# Patient Record
Sex: FEMALE | Race: WHITE | Employment: OTHER | URBAN - METROPOLITAN AREA
[De-identification: names, ages, dates, MRNs, and addresses within clinical notes are randomized per-mention and may not be internally consistent; named-entity substitution may affect disease eponyms.]

---

## 2021-08-03 PROBLEM — I25.10 CAD (CORONARY ARTERY DISEASE): Status: RESOLVED | Noted: 2021-08-03 | Resolved: 2021-08-03

## 2021-11-08 ENCOUNTER — HOSPITAL ENCOUNTER (OUTPATIENT)
Dept: LAB | Age: 76
Discharge: HOME | End: 2021-11-08
Payer: MEDICARE

## 2021-11-08 DIAGNOSIS — R06.02 SHORTNESS OF BREATH: ICD-10-CM

## 2021-11-08 LAB — BNP SERPL-MCNC: 179 PG/ML

## 2021-11-08 PROCEDURE — 36415 COLL VENOUS BLD VENIPUNCTURE: CPT

## 2021-11-08 PROCEDURE — 83880 ASSAY OF NATRIURETIC PEPTIDE: CPT

## 2021-12-13 PROBLEM — I34.0 MITRAL VALVE INSUFFICIENCY: Status: ACTIVE | Noted: 2021-12-13

## 2021-12-13 PROBLEM — I35.0 NONRHEUMATIC AORTIC VALVE STENOSIS: Status: ACTIVE | Noted: 2021-12-13

## 2021-12-13 PROBLEM — R06.09 DOE (DYSPNEA ON EXERTION): Status: ACTIVE | Noted: 2021-12-13

## 2021-12-23 NOTE — PROGRESS NOTES
Pre-procedure call completed. Instructed to arrive at 0600 for [facility]. Instructed to take ASA 81 mg x 4 before arrival while taking all other am prescribed medications. Instructed to HOLD all vitamins and supplements.

## 2021-12-27 ENCOUNTER — HOSPITAL ENCOUNTER (OUTPATIENT)
Age: 76
Setting detail: OUTPATIENT SURGERY
Discharge: HOME | End: 2021-12-27
Attending: INTERNAL MEDICINE | Admitting: INTERNAL MEDICINE
Payer: MEDICARE

## 2021-12-27 VITALS
HEART RATE: 74 BPM | DIASTOLIC BLOOD PRESSURE: 72 MMHG | RESPIRATION RATE: 16 BRPM | WEIGHT: 172 LBS | TEMPERATURE: 98.6 F | OXYGEN SATURATION: 96 % | BODY MASS INDEX: 29.37 KG/M2 | HEIGHT: 64 IN | SYSTOLIC BLOOD PRESSURE: 137 MMHG

## 2021-12-27 DIAGNOSIS — R06.09 DOE (DYSPNEA ON EXERTION): ICD-10-CM

## 2021-12-27 DIAGNOSIS — I34.0 MITRAL VALVE INSUFFICIENCY, UNSPECIFIED ETIOLOGY: ICD-10-CM

## 2021-12-27 DIAGNOSIS — I35.0 NONRHEUMATIC AORTIC VALVE STENOSIS: ICD-10-CM

## 2021-12-27 DIAGNOSIS — I25.10 CORONARY ARTERY DISEASE INVOLVING NATIVE CORONARY ARTERY OF NATIVE HEART WITHOUT ANGINA PECTORIS: ICD-10-CM

## 2021-12-27 LAB
ALBUMIN SERPL-MCNC: 3.5 G/DL (ref 3.2–4.6)
ALBUMIN/GLOB SERPL: 1.1 {RATIO} (ref 1.2–3.5)
ALP SERPL-CCNC: 65 U/L (ref 50–136)
ALT SERPL-CCNC: 17 U/L (ref 12–65)
ANION GAP SERPL CALC-SCNC: 4 MMOL/L (ref 7–16)
AST SERPL-CCNC: 17 U/L (ref 15–37)
ATRIAL RATE: 71 BPM
BILIRUB SERPL-MCNC: 0.3 MG/DL (ref 0.2–1.1)
BUN SERPL-MCNC: 15 MG/DL (ref 8–23)
CALCIUM SERPL-MCNC: 9.1 MG/DL (ref 8.3–10.4)
CALCULATED P AXIS, ECG09: 40 DEGREES
CALCULATED R AXIS, ECG10: 24 DEGREES
CALCULATED T AXIS, ECG11: -7 DEGREES
CHLORIDE SERPL-SCNC: 108 MMOL/L (ref 98–107)
CO2 SERPL-SCNC: 30 MMOL/L (ref 21–32)
CREAT SERPL-MCNC: 0.86 MG/DL (ref 0.6–1)
DIAGNOSIS, 93000: NORMAL
ERYTHROCYTE [DISTWIDTH] IN BLOOD BY AUTOMATED COUNT: 11.9 % (ref 11.9–14.6)
GLOBULIN SER CALC-MCNC: 3.3 G/DL (ref 2.3–3.5)
GLUCOSE SERPL-MCNC: 99 MG/DL (ref 65–100)
HCT VFR BLD AUTO: 38.9 % (ref 35.8–46.3)
HGB BLD-MCNC: 12.7 G/DL (ref 11.7–15.4)
MAGNESIUM SERPL-MCNC: 2.1 MG/DL (ref 1.8–2.4)
MCH RBC QN AUTO: 30 PG (ref 26.1–32.9)
MCHC RBC AUTO-ENTMCNC: 32.6 G/DL (ref 31.4–35)
MCV RBC AUTO: 91.7 FL (ref 79.6–97.8)
NRBC # BLD: 0 K/UL (ref 0–0.2)
P-R INTERVAL, ECG05: 232 MS
PLATELET # BLD AUTO: 221 K/UL (ref 150–450)
PMV BLD AUTO: 10.1 FL (ref 9.4–12.3)
POTASSIUM SERPL-SCNC: 3.2 MMOL/L (ref 3.5–5.1)
PROT SERPL-MCNC: 6.8 G/DL (ref 6.3–8.2)
Q-T INTERVAL, ECG07: 440 MS
QRS DURATION, ECG06: 116 MS
QTC CALCULATION (BEZET), ECG08: 478 MS
RBC # BLD AUTO: 4.24 M/UL (ref 4.05–5.2)
SODIUM SERPL-SCNC: 142 MMOL/L (ref 136–145)
VENTRICULAR RATE, ECG03: 71 BPM
WBC # BLD AUTO: 6.5 K/UL (ref 4.3–11.1)

## 2021-12-27 PROCEDURE — 74011250636 HC RX REV CODE- 250/636: Performed by: INTERNAL MEDICINE

## 2021-12-27 PROCEDURE — C1894 INTRO/SHEATH, NON-LASER: HCPCS | Performed by: INTERNAL MEDICINE

## 2021-12-27 PROCEDURE — 93005 ELECTROCARDIOGRAM TRACING: CPT | Performed by: INTERNAL MEDICINE

## 2021-12-27 PROCEDURE — 77030042317 HC BND COMPR HEMSTAT -B: Performed by: INTERNAL MEDICINE

## 2021-12-27 PROCEDURE — 99153 MOD SED SAME PHYS/QHP EA: CPT | Performed by: INTERNAL MEDICINE

## 2021-12-27 PROCEDURE — C1751 CATH, INF, PER/CENT/MIDLINE: HCPCS | Performed by: INTERNAL MEDICINE

## 2021-12-27 PROCEDURE — 93460 R&L HRT ART/VENTRICLE ANGIO: CPT | Performed by: INTERNAL MEDICINE

## 2021-12-27 PROCEDURE — 99152 MOD SED SAME PHYS/QHP 5/>YRS: CPT | Performed by: INTERNAL MEDICINE

## 2021-12-27 PROCEDURE — C1769 GUIDE WIRE: HCPCS | Performed by: INTERNAL MEDICINE

## 2021-12-27 PROCEDURE — 74011000250 HC RX REV CODE- 250: Performed by: INTERNAL MEDICINE

## 2021-12-27 PROCEDURE — 77030013687 HC GD NDL BARD -B: Performed by: INTERNAL MEDICINE

## 2021-12-27 PROCEDURE — 83735 ASSAY OF MAGNESIUM: CPT

## 2021-12-27 PROCEDURE — 80053 COMPREHEN METABOLIC PANEL: CPT

## 2021-12-27 PROCEDURE — 85027 COMPLETE CBC AUTOMATED: CPT

## 2021-12-27 PROCEDURE — 77030015766: Performed by: INTERNAL MEDICINE

## 2021-12-27 PROCEDURE — 77030016699 HC CATH ANGI DX INFN1 CARD -A: Performed by: INTERNAL MEDICINE

## 2021-12-27 PROCEDURE — 74011000636 HC RX REV CODE- 636: Performed by: INTERNAL MEDICINE

## 2021-12-27 RX ORDER — LIDOCAINE HYDROCHLORIDE 10 MG/ML
INJECTION INFILTRATION; PERINEURAL AS NEEDED
Status: DISCONTINUED | OUTPATIENT
Start: 2021-12-27 | End: 2021-12-27 | Stop reason: HOSPADM

## 2021-12-27 RX ORDER — HEPARIN SODIUM 200 [USP'U]/100ML
INJECTION, SOLUTION INTRAVENOUS
Status: COMPLETED | OUTPATIENT
Start: 2021-12-27 | End: 2021-12-27

## 2021-12-27 RX ORDER — MIDAZOLAM HYDROCHLORIDE 1 MG/ML
INJECTION, SOLUTION INTRAMUSCULAR; INTRAVENOUS AS NEEDED
Status: DISCONTINUED | OUTPATIENT
Start: 2021-12-27 | End: 2021-12-27 | Stop reason: HOSPADM

## 2021-12-27 RX ORDER — SODIUM CHLORIDE 9 MG/ML
75 INJECTION, SOLUTION INTRAVENOUS CONTINUOUS
Status: DISCONTINUED | OUTPATIENT
Start: 2021-12-27 | End: 2021-12-27 | Stop reason: HOSPADM

## 2021-12-27 RX ORDER — GUAIFENESIN 100 MG/5ML
81-324 LIQUID (ML) ORAL ONCE
Status: DISCONTINUED | OUTPATIENT
Start: 2021-12-27 | End: 2021-12-27 | Stop reason: HOSPADM

## 2021-12-27 RX ORDER — FENTANYL CITRATE 50 UG/ML
INJECTION, SOLUTION INTRAMUSCULAR; INTRAVENOUS AS NEEDED
Status: DISCONTINUED | OUTPATIENT
Start: 2021-12-27 | End: 2021-12-27 | Stop reason: HOSPADM

## 2021-12-27 NOTE — PROGRESS NOTES
Report received from [facility] Chrisman RN. Procedural findings communicated. Intra procedural  medication administration reviewed. Progression of care discussed.      Patient received into [address] [facility] 7 post sheath removal.     Access site without bleeding or swelling yes    Dressing dry and intact yes    Patient instructed to limit movement to right upper extremity    Routine post procedural vital signs and site assessment initiated yes

## 2021-12-27 NOTE — PROGRESS NOTES
Report received from [address] [place] [address]. Procedural findings communicated. Intra procedural  medication administration reviewed. Progression of care discussed.      Patient received into [address] [facility] 7 post sheath removal.     Access site without bleeding or swelling yes    Dressing dry and intact yes    Patient instructed to limit movement to left upper extremity    Routine post procedural vital signs and site assessment initiated yes

## 2021-12-27 NOTE — PROGRESS NOTES
TRANSFER - OUT REPORT:    Verbal report given to RN(name) on Maria Del Carmen Cee  being transferred to CPRU(unit) for routine progression of care       Report consisted of patients Situation, Background, Assessment and   Recommendations(SBAR). Information from the following report(s) SBAR was reviewed with the receiving nurse. C and C wLottie Kapadia  Diagnostic, no interventions  R brachial Venous access - 7 fr sheath left in place  RRA - TR band 14 mls    2 mg Versed  25 mcg Fentanyl  5000 units Heparin

## 2021-12-27 NOTE — PROGRESS NOTES
Pt arrived, ambulated to room with no visible problems, planned [facility] for Dr Radha Guzman. Consent signed, Procedure discussed with pt all questions answered voiced understanding. Medications and history discussed with pt.     Pt prepped per ordersThe patient has a fraility score of 3-MANAGING WELL, based on ability to complete ADLs without assistance      Patient took Aspirin 324mg today at 0430 prior to arrival.

## 2021-12-27 NOTE — PROGRESS NOTES
Right venous sheath removed from right arm. Pressure held x 20 mins. 4x4 gauze dsg with tegaderm applied to site.

## 2021-12-27 NOTE — DISCHARGE INSTRUCTIONS
HEART CATHETERIZATION/ANGIOGRAPHY DISCHARGE INSTRUCTIONS    1. Check puncture site frequently for swelling or bleeding. If there is any bleeding, lie down and apply pressure over the area with a clean towel or washcloth. Notify your doctor for any redness, swelling, drainage, or oozing from the puncture site. Notify your doctor for any fever or chills. 2. If the extremity becomes cold, numb, or painful call [facility] Cardiology at [phone].  3. Activity should be limited for the next 48 hours. Climb stairs as little as possible and avoid any stooping, bending, or strenuous activity for 48 hours. No heavy lifting (anything over 10 pounds) for 3 days. 4. You may resume your usual diet. Drink more fluids than usual.  5. Have a responsible person drive you home and stay with you for at least 24 hours after your heart catheterization/angiography. 6. You may remove bandage from your Right wrist in 24 hours. You may shower in 24 hours. No tub baths, hot tubs, or swimming for 1 week. Do not place any lotions, creams, powders, or ointments over puncture site for 1 week. You may place a clean band-aid over the puncture site each day for 5 days. Change daily. I have read the above instructions and have had the opportunity to ask questions.       Patient: ________________________   Date: 12/27/2021    Witness: _______________________   Date: 12/27/2021

## 2021-12-27 NOTE — PROGRESS NOTES
Discharge instructions given. TR band removed from right wrist with no bleeding or swelling noted. No complaints. Family at bedside.

## 2022-03-19 PROBLEM — I34.0 MITRAL VALVE INSUFFICIENCY: Status: ACTIVE | Noted: 2021-12-13

## 2022-03-19 PROBLEM — R06.09 DOE (DYSPNEA ON EXERTION): Status: ACTIVE | Noted: 2021-12-13

## 2022-03-19 PROBLEM — I35.0 NONRHEUMATIC AORTIC VALVE STENOSIS: Status: ACTIVE | Noted: 2021-12-13

## 2023-08-03 ENCOUNTER — TELEPHONE (OUTPATIENT)
Age: 78
End: 2023-08-03

## 2023-08-03 DIAGNOSIS — E78.5 DYSLIPIDEMIA: ICD-10-CM

## 2023-08-03 DIAGNOSIS — M79.606 LEG PAIN: Primary | ICD-10-CM

## 2023-09-14 ENCOUNTER — OFFICE VISIT (OUTPATIENT)
Age: 78
End: 2023-09-14
Payer: MEDICARE

## 2023-09-14 VITALS
SYSTOLIC BLOOD PRESSURE: 130 MMHG | HEIGHT: 64 IN | WEIGHT: 165 LBS | BODY MASS INDEX: 28.17 KG/M2 | HEART RATE: 80 BPM | DIASTOLIC BLOOD PRESSURE: 70 MMHG

## 2023-09-14 DIAGNOSIS — I35.0 NONRHEUMATIC AORTIC VALVE STENOSIS: Primary | ICD-10-CM

## 2023-09-14 DIAGNOSIS — I25.10 ASCVD (ARTERIOSCLEROTIC CARDIOVASCULAR DISEASE): ICD-10-CM

## 2023-09-14 DIAGNOSIS — M79.604 PAIN OF RIGHT LOWER EXTREMITY: ICD-10-CM

## 2023-09-14 PROCEDURE — 99214 OFFICE O/P EST MOD 30 MIN: CPT | Performed by: INTERNAL MEDICINE

## 2023-09-14 PROCEDURE — 93000 ELECTROCARDIOGRAM COMPLETE: CPT | Performed by: INTERNAL MEDICINE

## 2023-09-14 PROCEDURE — 1123F ACP DISCUSS/DSCN MKR DOCD: CPT | Performed by: INTERNAL MEDICINE

## 2023-09-14 RX ORDER — POTASSIUM CHLORIDE 750 MG/1
10 TABLET, FILM COATED, EXTENDED RELEASE ORAL DAILY
Qty: 30 TABLET | Refills: 11 | COMMUNITY
Start: 2023-01-18 | End: 2024-01-18

## 2023-09-14 RX ORDER — DULOXETIN HYDROCHLORIDE 60 MG/1
60 CAPSULE, DELAYED RELEASE ORAL DAILY
COMMUNITY
Start: 2023-02-27

## 2023-09-14 NOTE — PROGRESS NOTES
EKG 12 lead       Return in about 6 months (around 3/14/2024).        Collin Pradhan MD  9/14/2023  5:29 PM

## 2024-03-26 ENCOUNTER — OFFICE VISIT (OUTPATIENT)
Age: 79
End: 2024-03-26
Payer: MEDICARE

## 2024-03-26 VITALS
HEART RATE: 52 BPM | SYSTOLIC BLOOD PRESSURE: 106 MMHG | BODY MASS INDEX: 28.32 KG/M2 | HEIGHT: 64 IN | DIASTOLIC BLOOD PRESSURE: 64 MMHG

## 2024-03-26 DIAGNOSIS — I35.0 NONRHEUMATIC AORTIC VALVE STENOSIS: Primary | ICD-10-CM

## 2024-03-26 DIAGNOSIS — I34.0 NONRHEUMATIC MITRAL VALVE REGURGITATION: ICD-10-CM

## 2024-03-26 DIAGNOSIS — I25.10 ASCVD (ARTERIOSCLEROTIC CARDIOVASCULAR DISEASE): ICD-10-CM

## 2024-03-26 PROCEDURE — 99214 OFFICE O/P EST MOD 30 MIN: CPT | Performed by: INTERNAL MEDICINE

## 2024-03-26 PROCEDURE — 1123F ACP DISCUSS/DSCN MKR DOCD: CPT | Performed by: INTERNAL MEDICINE

## 2024-03-26 RX ORDER — NITROGLYCERIN 0.4 MG/1
0.4 TABLET SUBLINGUAL EVERY 5 MIN PRN
Qty: 25 TABLET | Refills: 3 | Status: SHIPPED | OUTPATIENT
Start: 2024-03-26

## 2024-03-26 NOTE — PROGRESS NOTES
[facility] CARDIOLOGY  [address] [place] [address], [address]  [place] [address]  PHONE: [phone]        24        NAME:  Tanika Medina  : 1945  MRN: [id number]     1. SUMNER (dyspnea on exertion)  2. Malaise and fatigue  3. Nonrheumatic aortic valve stenosis  4. Mitral valve insufficiency, unspecified etiology  5. Coronary artery disease involving native coronary artery of native heart without angina pectoris    CHIEF COMPLAINT:    Valvular Heart Disease      SUBJECTIVE:     Some positional dizziness.  Recent endoscopy+ gastric ulcer  She has had  episodes of labored breathing       Medications were all reviewed with the patient today and updated as necessary.   Current Outpatient Medications   Medication Sig    nitroGLYCERIN (NITROSTAT) 0.4 MG SL tablet Place 1 tablet under the tongue every 5 minutes as needed for Chest pain up to max of 3 total doses. If no relief after 1 dose, call 911.    DULoxetine (CYMBALTA) 60 MG extended release capsule Take 1 capsule by mouth daily    potassium chloride (KLOR-CON 10) 10 MEQ extended release tablet Take 1 tablet by mouth daily    BIOTIN PO Take by mouth    CALCIUM PO Take 1,200 mg by mouth    aluminum hydroxide-magnesium carbonate (GENATON)  MG/15ML suspension Take 15 mLs by mouth every 6 hours as needed    Cholecalciferol 50 MCG (2000 UT) TABS Take by mouth 2 times daily    cyanocobalamin 1000 MCG tablet Take 1 tablet by mouth daily    cyclobenzaprine (FLEXERIL) 10 MG tablet Take 1 tablet by mouth 3 times daily as needed    diclofenac sodium (VOLTAREN) 1 % GEL Apply topically 4 times daily    gabapentin (NEURONTIN) 100 MG capsule Take 1 capsule by mouth daily.    HYDROcodone-acetaminophen (NORCO)  MG per tablet Take 1 tablet by mouth every 6 hours as needed.    levothyroxine (SYNTHROID) 150 MCG tablet Take 50 mcg by mouth every morning (before breakfast)    naloxegol (MOVANTIK) 25 MG TABS tablet Take 1 tablet by mouth daily as needed

## 2024-06-29 ENCOUNTER — APPOINTMENT (OUTPATIENT)
Dept: NON INVASIVE DIAGNOSTICS | Age: 79
DRG: 212 | End: 2024-06-29
Attending: INTERNAL MEDICINE
Payer: MEDICARE

## 2024-06-29 ENCOUNTER — APPOINTMENT (OUTPATIENT)
Dept: CT IMAGING | Age: 79
DRG: 212 | End: 2024-06-29
Payer: MEDICARE

## 2024-06-29 ENCOUNTER — APPOINTMENT (OUTPATIENT)
Dept: GENERAL RADIOLOGY | Age: 79
DRG: 212 | End: 2024-06-29
Payer: MEDICARE

## 2024-06-29 ENCOUNTER — HOSPITAL ENCOUNTER (INPATIENT)
Age: 79
LOS: 24 days | Discharge: NURSING, REHAB OR LONG-TERM CARE | DRG: 212 | End: 2024-07-23
Attending: STUDENT IN AN ORGANIZED HEALTH CARE EDUCATION/TRAINING PROGRAM | Admitting: THORACIC SURGERY (CARDIOTHORACIC VASCULAR SURGERY)
Payer: MEDICARE

## 2024-06-29 DIAGNOSIS — Z98.890 S/P MVR (MITRAL VALVE REPAIR): ICD-10-CM

## 2024-06-29 DIAGNOSIS — Z95.2 S/P AVR: ICD-10-CM

## 2024-06-29 DIAGNOSIS — J96.01 ACUTE RESPIRATORY FAILURE WITH HYPOXIA (HCC): Primary | ICD-10-CM

## 2024-06-29 DIAGNOSIS — R07.9 CHEST PAIN: ICD-10-CM

## 2024-06-29 DIAGNOSIS — I35.1 NONRHEUMATIC AORTIC VALVE INSUFFICIENCY: ICD-10-CM

## 2024-06-29 DIAGNOSIS — I47.21 TORSADES DE POINTES (HCC): ICD-10-CM

## 2024-06-29 DIAGNOSIS — I47.20 VT (VENTRICULAR TACHYCARDIA) (HCC): ICD-10-CM

## 2024-06-29 DIAGNOSIS — R01.1 HEART MURMUR: ICD-10-CM

## 2024-06-29 PROBLEM — J81.0 ACUTE PULMONARY EDEMA (HCC): Status: ACTIVE | Noted: 2024-06-29

## 2024-06-29 PROBLEM — J96.21 ACUTE ON CHRONIC RESPIRATORY FAILURE WITH HYPOXIA (HCC): Status: ACTIVE | Noted: 2024-06-29

## 2024-06-29 PROBLEM — J90 PLEURAL EFFUSION: Status: ACTIVE | Noted: 2024-06-29

## 2024-06-29 PROBLEM — E87.70 VOLUME OVERLOAD: Status: ACTIVE | Noted: 2024-06-29

## 2024-06-29 LAB
ALBUMIN SERPL-MCNC: 3.2 G/DL (ref 3.2–4.6)
ALBUMIN/GLOB SERPL: 1.3 (ref 1–1.9)
ALP SERPL-CCNC: 58 U/L (ref 35–104)
ALT SERPL-CCNC: 11 U/L (ref 12–65)
ANION GAP BLD CALC-SCNC: ABNORMAL MMOL/L
ANION GAP SERPL CALC-SCNC: 12 MMOL/L (ref 9–18)
ANION GAP SERPL CALC-SCNC: 14 MMOL/L (ref 9–18)
ARTERIAL PATENCY WRIST A: POSITIVE
AST SERPL-CCNC: 21 U/L (ref 15–37)
BASE DEFICIT BLD-SCNC: 0.5 MMOL/L
BASOPHILS # BLD: 0.1 K/UL (ref 0–0.2)
BASOPHILS NFR BLD: 1 % (ref 0–2)
BDY SITE: ABNORMAL
BILIRUB SERPL-MCNC: 0.3 MG/DL (ref 0–1.2)
BUN SERPL-MCNC: 10 MG/DL (ref 8–23)
BUN SERPL-MCNC: 9 MG/DL (ref 8–23)
CA-I BLD-MCNC: 1.19 MMOL/L (ref 1.12–1.32)
CALCIUM SERPL-MCNC: 8.7 MG/DL (ref 8.8–10.2)
CALCIUM SERPL-MCNC: 8.9 MG/DL (ref 8.8–10.2)
CHLORIDE SERPL-SCNC: 101 MMOL/L (ref 98–107)
CHLORIDE SERPL-SCNC: 107 MMOL/L (ref 98–107)
CO2 BLD-SCNC: 26 MMOL/L (ref 13–23)
CO2 SERPL-SCNC: 23 MMOL/L (ref 20–28)
CO2 SERPL-SCNC: 25 MMOL/L (ref 20–28)
CREAT SERPL-MCNC: 0.73 MG/DL (ref 0.6–1.1)
CREAT SERPL-MCNC: 0.83 MG/DL (ref 0.6–1.1)
DIFFERENTIAL METHOD BLD: NORMAL
ECHO AO ASC DIAM: 2.9 CM
ECHO AO ROOT DIAM: 3 CM
ECHO AR MAX VEL PISA: 4.3 M/S
ECHO AV AREA PEAK VELOCITY: 1.2 CM2
ECHO AV AREA VTI: 1.3 CM2
ECHO AV MEAN GRADIENT: 20 MMHG
ECHO AV MEAN VELOCITY: 2.2 M/S
ECHO AV PEAK GRADIENT: 29 MMHG
ECHO AV PEAK VELOCITY: 2.7 M/S
ECHO AV REGURGITANT PHT: 328 MS
ECHO AV VELOCITY RATIO: 0.41
ECHO AV VTI: 57.9 CM
ECHO EST RA PRESSURE: 8 MMHG
ECHO IVC PROX: 1.2 CM
ECHO LA AREA 2C: 18.9 CM2
ECHO LA AREA 4C: 21.9 CM2
ECHO LA DIAMETER: 4.3 CM
ECHO LA MAJOR AXIS: 5.9 CM
ECHO LA MINOR AXIS: 4.9 CM
ECHO LA TO AORTIC ROOT RATIO: 1.43
ECHO LA VOL BP: 67 ML (ref 22–52)
ECHO LA VOL MOD A2C: 60 ML (ref 22–52)
ECHO LA VOL MOD A4C: 62 ML (ref 22–52)
ECHO LV E' LATERAL VELOCITY: 4 CM/S
ECHO LV E' SEPTAL VELOCITY: 6 CM/S
ECHO LV EDV A2C: 159 ML
ECHO LV EDV A4C: 156 ML
ECHO LV EJECTION FRACTION A2C: 45 %
ECHO LV EJECTION FRACTION A4C: 42 %
ECHO LV EJECTION FRACTION BIPLANE: 45 % (ref 55–100)
ECHO LV ESV A2C: 87 ML
ECHO LV ESV A4C: 91 ML
ECHO LV FRACTIONAL SHORTENING: 28 % (ref 28–44)
ECHO LV INTERNAL DIMENSION DIASTOLIC: 5 CM (ref 3.9–5.3)
ECHO LV INTERNAL DIMENSION SYSTOLIC: 3.6 CM
ECHO LV IVSD: 1.3 CM (ref 0.6–0.9)
ECHO LV MASS 2D: 233.7 G (ref 67–162)
ECHO LV POSTERIOR WALL DIASTOLIC: 1.1 CM (ref 0.6–0.9)
ECHO LV RELATIVE WALL THICKNESS RATIO: 0.44
ECHO LVOT AREA: 2.8 CM2
ECHO LVOT AV VTI INDEX: 0.45
ECHO LVOT DIAM: 1.9 CM
ECHO LVOT MEAN GRADIENT: 3 MMHG
ECHO LVOT PEAK GRADIENT: 5 MMHG
ECHO LVOT PEAK VELOCITY: 1.1 M/S
ECHO LVOT SV: 73.4 ML
ECHO LVOT VTI: 25.9 CM
ECHO MV A VELOCITY: 1.15 M/S
ECHO MV AREA VTI: 2.8 CM2
ECHO MV E DECELERATION TIME (DT): 264 MS
ECHO MV E VELOCITY: 0.92 M/S
ECHO MV E/A RATIO: 0.8
ECHO MV E/E' LATERAL: 23
ECHO MV E/E' RATIO (AVERAGED): 19.17
ECHO MV E/E' SEPTAL: 15.33
ECHO MV LVOT VTI INDEX: 1.01
ECHO MV MAX VELOCITY: 1.3 M/S
ECHO MV MEAN GRADIENT: 4 MMHG
ECHO MV MEAN VELOCITY: 0.9 M/S
ECHO MV PEAK GRADIENT: 7 MMHG
ECHO MV VTI: 26.1 CM
ECHO PV ACCELERATION TIME (AT): 100 MS
ECHO PV MAX VELOCITY: 1.3 M/S
ECHO PV PEAK GRADIENT: 6 MMHG
ECHO RIGHT VENTRICULAR SYSTOLIC PRESSURE (RVSP): 17 MMHG
ECHO RV BASAL DIMENSION: 3.5 CM
ECHO RV FREE WALL PEAK S': 14 CM/S
ECHO RV TAPSE: 2 CM (ref 1.7–?)
ECHO TV REGURGITANT MAX VELOCITY: 1.52 M/S
ECHO TV REGURGITANT PEAK GRADIENT: 9 MMHG
EOSINOPHIL # BLD: 0.2 K/UL (ref 0–0.8)
EOSINOPHIL NFR BLD: 2 % (ref 0.5–7.8)
ERYTHROCYTE [DISTWIDTH] IN BLOOD BY AUTOMATED COUNT: 12.8 % (ref 11.9–14.6)
FIO2 ON VENT: 100 %
GAS FLOW.O2 O2 DELIVERY SYS: ABNORMAL
GLOBULIN SER CALC-MCNC: 2.5 G/DL (ref 2.3–3.5)
GLUCOSE BLD STRIP.AUTO-MCNC: 167 MG/DL (ref 65–100)
GLUCOSE SERPL-MCNC: 111 MG/DL (ref 70–99)
GLUCOSE SERPL-MCNC: 161 MG/DL (ref 70–99)
HCO3 BLD-SCNC: 25.4 MMOL/L (ref 22–26)
HCT VFR BLD AUTO: 39.2 % (ref 35.8–46.3)
HGB BLD-MCNC: 12.6 G/DL (ref 11.7–15.4)
IMM GRANULOCYTES # BLD AUTO: 0 K/UL (ref 0–0.5)
IMM GRANULOCYTES NFR BLD AUTO: 0 % (ref 0–5)
LACTATE SERPL-SCNC: 3.2 MMOL/L (ref 0.5–2)
LACTATE SERPL-SCNC: 3.3 MMOL/L (ref 0.5–2)
LYMPHOCYTES # BLD: 1.9 K/UL (ref 0.5–4.6)
LYMPHOCYTES NFR BLD: 21 % (ref 13–44)
MAGNESIUM SERPL-MCNC: 1.7 MG/DL (ref 1.8–2.4)
MCH RBC QN AUTO: 29.9 PG (ref 26.1–32.9)
MCHC RBC AUTO-ENTMCNC: 32.1 G/DL (ref 31.4–35)
MCV RBC AUTO: 93.1 FL (ref 82–102)
MONOCYTES # BLD: 0.3 K/UL (ref 0.1–1.3)
MONOCYTES NFR BLD: 4 % (ref 4–12)
NEUTS SEG # BLD: 6.4 K/UL (ref 1.7–8.2)
NEUTS SEG NFR BLD: 72 % (ref 43–78)
NRBC # BLD: 0 K/UL (ref 0–0.2)
NT PRO BNP: 2154 PG/ML (ref 0–450)
PCO2 BLD: 45.7 MMHG (ref 35–45)
PEEP RESPIRATORY: 8
PH BLD: 7.35 (ref 7.35–7.45)
PLATELET # BLD AUTO: 154 K/UL (ref 150–450)
PMV BLD AUTO: 10.2 FL (ref 9.4–12.3)
PO2 BLD: 231 MMHG (ref 75–100)
POTASSIUM BLD-SCNC: 3.4 MMOL/L (ref 3.5–5.1)
POTASSIUM SERPL-SCNC: 3.5 MMOL/L (ref 3.5–5.1)
POTASSIUM SERPL-SCNC: 3.9 MMOL/L (ref 3.5–5.1)
PROCALCITONIN SERPL-MCNC: 0.03 NG/ML (ref 0–0.1)
PROT SERPL-MCNC: 5.7 G/DL (ref 6.3–8.2)
RBC # BLD AUTO: 4.21 M/UL (ref 4.05–5.2)
RESPIRATORY RATE: 18
SAO2 % BLD: 100 %
SERVICE CMNT-IMP: ABNORMAL
SERVICE CMNT-IMP: ABNORMAL
SODIUM BLD-SCNC: 144 MMOL/L (ref 136–145)
SODIUM SERPL-SCNC: 140 MMOL/L (ref 136–145)
SODIUM SERPL-SCNC: 142 MMOL/L (ref 136–145)
SPECIMEN SITE: ABNORMAL
TROPONIN T SERPL HS-MCNC: 22 NG/L (ref 0–14)
TROPONIN T SERPL HS-MCNC: 45 NG/L (ref 0–14)
VENTILATION MODE VENT: ABNORMAL
VT SETTING VENT: 500
WBC # BLD AUTO: 8.8 K/UL (ref 4.3–11.1)

## 2024-06-29 PROCEDURE — 0D9670Z DRAINAGE OF STOMACH WITH DRAINAGE DEVICE, VIA NATURAL OR ARTIFICIAL OPENING: ICD-10-PCS | Performed by: RADIOLOGY

## 2024-06-29 PROCEDURE — 71260 CT THORAX DX C+: CPT

## 2024-06-29 PROCEDURE — 36415 COLL VENOUS BLD VENIPUNCTURE: CPT

## 2024-06-29 PROCEDURE — 2000000000 HC ICU R&B

## 2024-06-29 PROCEDURE — 94002 VENT MGMT INPAT INIT DAY: CPT

## 2024-06-29 PROCEDURE — 87040 BLOOD CULTURE FOR BACTERIA: CPT

## 2024-06-29 PROCEDURE — 85025 COMPLETE CBC W/AUTO DIFF WBC: CPT

## 2024-06-29 PROCEDURE — 93306 TTE W/DOPPLER COMPLETE: CPT | Performed by: INTERNAL MEDICINE

## 2024-06-29 PROCEDURE — 6370000000 HC RX 637 (ALT 250 FOR IP): Performed by: INTERNAL MEDICINE

## 2024-06-29 PROCEDURE — 2580000003 HC RX 258: Performed by: INTERNAL MEDICINE

## 2024-06-29 PROCEDURE — 84132 ASSAY OF SERUM POTASSIUM: CPT

## 2024-06-29 PROCEDURE — 6360000002 HC RX W HCPCS: Performed by: NURSE PRACTITIONER

## 2024-06-29 PROCEDURE — 84484 ASSAY OF TROPONIN QUANT: CPT

## 2024-06-29 PROCEDURE — 83880 ASSAY OF NATRIURETIC PEPTIDE: CPT

## 2024-06-29 PROCEDURE — 84295 ASSAY OF SERUM SODIUM: CPT

## 2024-06-29 PROCEDURE — 6360000002 HC RX W HCPCS: Performed by: INTERNAL MEDICINE

## 2024-06-29 PROCEDURE — 2500000003 HC RX 250 WO HCPCS: Performed by: NURSE PRACTITIONER

## 2024-06-29 PROCEDURE — 82947 ASSAY GLUCOSE BLOOD QUANT: CPT

## 2024-06-29 PROCEDURE — 2500000003 HC RX 250 WO HCPCS: Performed by: STUDENT IN AN ORGANIZED HEALTH CARE EDUCATION/TRAINING PROGRAM

## 2024-06-29 PROCEDURE — C8929 TTE W OR WO FOL WCON,DOPPLER: HCPCS

## 2024-06-29 PROCEDURE — 80053 COMPREHEN METABOLIC PANEL: CPT

## 2024-06-29 PROCEDURE — 96375 TX/PRO/DX INJ NEW DRUG ADDON: CPT

## 2024-06-29 PROCEDURE — 96374 THER/PROPH/DIAG INJ IV PUSH: CPT

## 2024-06-29 PROCEDURE — 6360000002 HC RX W HCPCS: Performed by: PHYSICIAN ASSISTANT

## 2024-06-29 PROCEDURE — 83735 ASSAY OF MAGNESIUM: CPT

## 2024-06-29 PROCEDURE — 6360000004 HC RX CONTRAST MEDICATION: Performed by: INTERNAL MEDICINE

## 2024-06-29 PROCEDURE — 2580000003 HC RX 258: Performed by: STUDENT IN AN ORGANIZED HEALTH CARE EDUCATION/TRAINING PROGRAM

## 2024-06-29 PROCEDURE — 6360000002 HC RX W HCPCS: Performed by: STUDENT IN AN ORGANIZED HEALTH CARE EDUCATION/TRAINING PROGRAM

## 2024-06-29 PROCEDURE — 2580000003 HC RX 258: Performed by: NURSE PRACTITIONER

## 2024-06-29 PROCEDURE — 93005 ELECTROCARDIOGRAM TRACING: CPT | Performed by: STUDENT IN AN ORGANIZED HEALTH CARE EDUCATION/TRAINING PROGRAM

## 2024-06-29 PROCEDURE — 6360000004 HC RX CONTRAST MEDICATION: Performed by: NURSE PRACTITIONER

## 2024-06-29 PROCEDURE — 74018 RADEX ABDOMEN 1 VIEW: CPT

## 2024-06-29 PROCEDURE — 84145 PROCALCITONIN (PCT): CPT

## 2024-06-29 PROCEDURE — 99222 1ST HOSP IP/OBS MODERATE 55: CPT | Performed by: INTERNAL MEDICINE

## 2024-06-29 PROCEDURE — 82330 ASSAY OF CALCIUM: CPT

## 2024-06-29 PROCEDURE — 87086 URINE CULTURE/COLONY COUNT: CPT

## 2024-06-29 PROCEDURE — 83605 ASSAY OF LACTIC ACID: CPT

## 2024-06-29 PROCEDURE — 82803 BLOOD GASES ANY COMBINATION: CPT

## 2024-06-29 PROCEDURE — 99291 CRITICAL CARE FIRST HOUR: CPT | Performed by: INTERNAL MEDICINE

## 2024-06-29 PROCEDURE — 71045 X-RAY EXAM CHEST 1 VIEW: CPT

## 2024-06-29 PROCEDURE — 99285 EMERGENCY DEPT VISIT HI MDM: CPT

## 2024-06-29 RX ORDER — SODIUM CHLORIDE 0.9 % (FLUSH) 0.9 %
5-40 SYRINGE (ML) INJECTION EVERY 12 HOURS SCHEDULED
Status: DISCONTINUED | OUTPATIENT
Start: 2024-06-29 | End: 2024-07-11

## 2024-06-29 RX ORDER — 0.9 % SODIUM CHLORIDE 0.9 %
1000 INTRAVENOUS SOLUTION INTRAVENOUS
Status: COMPLETED | OUTPATIENT
Start: 2024-06-29 | End: 2024-06-29

## 2024-06-29 RX ORDER — FENTANYL CITRATE-0.9 % NACL/PF 10 MCG/ML
25-200 PLASTIC BAG, INJECTION (ML) INTRAVENOUS CONTINUOUS
Status: DISCONTINUED | OUTPATIENT
Start: 2024-06-29 | End: 2024-07-01 | Stop reason: ALTCHOICE

## 2024-06-29 RX ORDER — MAGNESIUM SULFATE IN WATER 40 MG/ML
2000 INJECTION, SOLUTION INTRAVENOUS PRN
Status: DISCONTINUED | OUTPATIENT
Start: 2024-06-29 | End: 2024-07-15

## 2024-06-29 RX ORDER — POLYETHYLENE GLYCOL 3350 17 G/17G
17 POWDER, FOR SOLUTION ORAL DAILY PRN
Status: DISCONTINUED | OUTPATIENT
Start: 2024-06-29 | End: 2024-07-11

## 2024-06-29 RX ORDER — ONDANSETRON 2 MG/ML
4 INJECTION INTRAMUSCULAR; INTRAVENOUS EVERY 6 HOURS PRN
Status: DISCONTINUED | OUTPATIENT
Start: 2024-06-29 | End: 2024-07-11

## 2024-06-29 RX ORDER — FENTANYL CITRATE 50 UG/ML
50 INJECTION, SOLUTION INTRAMUSCULAR; INTRAVENOUS
Status: DISCONTINUED | OUTPATIENT
Start: 2024-06-29 | End: 2024-07-01

## 2024-06-29 RX ORDER — PROPOFOL 10 MG/ML
INJECTION, EMULSION INTRAVENOUS
Status: DISPENSED
Start: 2024-06-29 | End: 2024-06-29

## 2024-06-29 RX ORDER — PROPOFOL 10 MG/ML
5-50 INJECTION, EMULSION INTRAVENOUS
Status: DISCONTINUED | OUTPATIENT
Start: 2024-06-29 | End: 2024-06-29 | Stop reason: SDUPTHER

## 2024-06-29 RX ORDER — ENOXAPARIN SODIUM 100 MG/ML
40 INJECTION SUBCUTANEOUS DAILY
Status: DISCONTINUED | OUTPATIENT
Start: 2024-06-29 | End: 2024-07-10

## 2024-06-29 RX ORDER — POTASSIUM CHLORIDE 7.45 MG/ML
10 INJECTION INTRAVENOUS PRN
Status: DISCONTINUED | OUTPATIENT
Start: 2024-06-29 | End: 2024-07-11

## 2024-06-29 RX ORDER — METOPROLOL SUCCINATE 25 MG/1
25 TABLET, EXTENDED RELEASE ORAL DAILY
Status: DISCONTINUED | OUTPATIENT
Start: 2024-06-29 | End: 2024-06-29 | Stop reason: SDUPTHER

## 2024-06-29 RX ORDER — ONDANSETRON 4 MG/1
4 TABLET, ORALLY DISINTEGRATING ORAL EVERY 8 HOURS PRN
Status: DISCONTINUED | OUTPATIENT
Start: 2024-06-29 | End: 2024-07-11

## 2024-06-29 RX ORDER — PROPOFOL 10 MG/ML
5-50 INJECTION, EMULSION INTRAVENOUS
Status: COMPLETED | OUTPATIENT
Start: 2024-06-29 | End: 2024-06-29

## 2024-06-29 RX ORDER — POTASSIUM CHLORIDE 29.8 MG/ML
20 INJECTION INTRAVENOUS PRN
Status: DISCONTINUED | OUTPATIENT
Start: 2024-06-29 | End: 2024-07-11

## 2024-06-29 RX ORDER — HYDRALAZINE HYDROCHLORIDE 10 MG/1
10 TABLET, FILM COATED ORAL EVERY 8 HOURS SCHEDULED
Status: DISCONTINUED | OUTPATIENT
Start: 2024-06-29 | End: 2024-06-29

## 2024-06-29 RX ORDER — FUROSEMIDE 10 MG/ML
40 INJECTION INTRAMUSCULAR; INTRAVENOUS DAILY
Status: DISCONTINUED | OUTPATIENT
Start: 2024-06-29 | End: 2024-07-01

## 2024-06-29 RX ORDER — FENTANYL CITRATE-0.9 % NACL/PF 20 MCG/2ML
50 SYRINGE (ML) INTRAVENOUS EVERY 30 MIN PRN
Status: DISCONTINUED | OUTPATIENT
Start: 2024-06-29 | End: 2024-07-01 | Stop reason: ALTCHOICE

## 2024-06-29 RX ORDER — SODIUM CHLORIDE 0.9 % (FLUSH) 0.9 %
5-40 SYRINGE (ML) INJECTION PRN
Status: DISCONTINUED | OUTPATIENT
Start: 2024-06-29 | End: 2024-07-11

## 2024-06-29 RX ORDER — HYDRALAZINE HYDROCHLORIDE 10 MG/1
10 TABLET, FILM COATED ORAL EVERY 8 HOURS SCHEDULED
Status: DISCONTINUED | OUTPATIENT
Start: 2024-06-29 | End: 2024-06-30

## 2024-06-29 RX ORDER — ACETAMINOPHEN 650 MG/1
650 SUPPOSITORY RECTAL EVERY 6 HOURS PRN
Status: DISCONTINUED | OUTPATIENT
Start: 2024-06-29 | End: 2024-07-01

## 2024-06-29 RX ORDER — ACETAMINOPHEN 325 MG/1
650 TABLET ORAL EVERY 6 HOURS PRN
Status: DISCONTINUED | OUTPATIENT
Start: 2024-06-29 | End: 2024-07-01

## 2024-06-29 RX ORDER — FUROSEMIDE 10 MG/ML
60 INJECTION INTRAMUSCULAR; INTRAVENOUS ONCE
Status: COMPLETED | OUTPATIENT
Start: 2024-06-29 | End: 2024-06-29

## 2024-06-29 RX ORDER — SODIUM CHLORIDE 9 MG/ML
INJECTION, SOLUTION INTRAVENOUS PRN
Status: DISCONTINUED | OUTPATIENT
Start: 2024-06-29 | End: 2024-07-15 | Stop reason: SDUPTHER

## 2024-06-29 RX ORDER — PROPOFOL 10 MG/ML
5-50 INJECTION, EMULSION INTRAVENOUS CONTINUOUS
Status: DISCONTINUED | OUTPATIENT
Start: 2024-06-29 | End: 2024-07-01 | Stop reason: ALTCHOICE

## 2024-06-29 RX ORDER — MAGNESIUM SULFATE IN WATER 40 MG/ML
2000 INJECTION, SOLUTION INTRAVENOUS ONCE
Status: COMPLETED | OUTPATIENT
Start: 2024-06-29 | End: 2024-06-29

## 2024-06-29 RX ORDER — PROPOFOL 10 MG/ML
5-50 INJECTION, EMULSION INTRAVENOUS
Status: DISCONTINUED | OUTPATIENT
Start: 2024-06-29 | End: 2024-06-29

## 2024-06-29 RX ORDER — HYDROMORPHONE HYDROCHLORIDE 1 MG/ML
1 INJECTION, SOLUTION INTRAMUSCULAR; INTRAVENOUS; SUBCUTANEOUS
Status: COMPLETED | OUTPATIENT
Start: 2024-06-29 | End: 2024-06-29

## 2024-06-29 RX ADMIN — SODIUM CHLORIDE 1000 ML: 9 INJECTION, SOLUTION INTRAVENOUS at 11:22

## 2024-06-29 RX ADMIN — PROPOFOL 5 MCG/KG/MIN: 10 INJECTION, EMULSION INTRAVENOUS at 10:50

## 2024-06-29 RX ADMIN — HYDRALAZINE HYDROCHLORIDE 10 MG: 10 TABLET ORAL at 17:39

## 2024-06-29 RX ADMIN — FAMOTIDINE 20 MG: 10 INJECTION, SOLUTION INTRAVENOUS at 21:10

## 2024-06-29 RX ADMIN — FAMOTIDINE 20 MG: 10 INJECTION, SOLUTION INTRAVENOUS at 14:00

## 2024-06-29 RX ADMIN — HYDRALAZINE HYDROCHLORIDE 10 MG: 10 TABLET ORAL at 21:17

## 2024-06-29 RX ADMIN — IOPAMIDOL 100 ML: 755 INJECTION, SOLUTION INTRAVENOUS at 13:35

## 2024-06-29 RX ADMIN — FENTANYL CITRATE 50 MCG/HR: 50 INJECTION, SOLUTION INTRAMUSCULAR; INTRAVENOUS at 19:49

## 2024-06-29 RX ADMIN — AZITHROMYCIN MONOHYDRATE 500 MG: 500 INJECTION, POWDER, LYOPHILIZED, FOR SOLUTION INTRAVENOUS at 12:27

## 2024-06-29 RX ADMIN — HYDROMORPHONE HYDROCHLORIDE 1 MG: 1 INJECTION, SOLUTION INTRAMUSCULAR; INTRAVENOUS; SUBCUTANEOUS at 12:18

## 2024-06-29 RX ADMIN — MAGNESIUM SULFATE HEPTAHYDRATE 2000 MG: 40 INJECTION, SOLUTION INTRAVENOUS at 14:08

## 2024-06-29 RX ADMIN — PROPOFOL 25 MCG/KG/MIN: 10 INJECTION, EMULSION INTRAVENOUS at 15:18

## 2024-06-29 RX ADMIN — ENOXAPARIN SODIUM 40 MG: 100 INJECTION SUBCUTANEOUS at 15:15

## 2024-06-29 RX ADMIN — FUROSEMIDE 60 MG: 10 INJECTION, SOLUTION INTRAMUSCULAR; INTRAVENOUS at 12:20

## 2024-06-29 RX ADMIN — METOPROLOL TARTRATE 12.5 MG: 25 TABLET, FILM COATED ORAL at 17:38

## 2024-06-29 RX ADMIN — SODIUM CHLORIDE, PRESERVATIVE FREE 10 ML: 5 INJECTION INTRAVENOUS at 19:55

## 2024-06-29 RX ADMIN — SODIUM CHLORIDE: 9 INJECTION, SOLUTION INTRAVENOUS at 14:05

## 2024-06-29 RX ADMIN — SODIUM CHLORIDE, PRESERVATIVE FREE 0.3 ML: 5 INJECTION INTRAVENOUS at 13:01

## 2024-06-29 RX ADMIN — PROPOFOL 20 MCG/KG/MIN: 10 INJECTION, EMULSION INTRAVENOUS at 17:35

## 2024-06-29 RX ADMIN — CEFTRIAXONE 1000 MG: 1 INJECTION, POWDER, FOR SOLUTION INTRAMUSCULAR; INTRAVENOUS at 12:20

## 2024-06-29 RX ADMIN — FENTANYL CITRATE 50 MCG: 50 INJECTION, SOLUTION INTRAMUSCULAR; INTRAVENOUS at 18:13

## 2024-06-29 RX ADMIN — FUROSEMIDE 40 MG: 10 INJECTION, SOLUTION INTRAMUSCULAR; INTRAVENOUS at 17:39

## 2024-06-29 NOTE — ED TRIAGE NOTES
Pt arrives via [facility] coming from home after being called out for respiratory distress. Pt was placed on CPAP w.EMS. unable to tolerate CPAP. Pt intubated en route by EMS. 20mg etomidate,20 mg rocuronium, 100mcg fentanyl, 10mg versed. ETT 24 at the lip

## 2024-06-29 NOTE — ED PROVIDER NOTES
Emergency Department Provider Note       PCP: Herbert Jeter MD   Age: 78 y.o.   Sex: female     DISPOSITION Decision To Admit 06/29/2024 12:06:13 PM       ICD-10-CM    1. Acute respiratory failure with hypoxia (HCC)  J96.01           Medical Decision Making     78-year-old female presenting as emergent traffic for respiratory failure, intubated prior to arrival.  Orders placed for broad-based lab workup, x-ray of the chest and EKG    Chest x-ray suggest pulmonary vascular congestion, patient's BNP is slightly elevated, initial troponin 22, no appreciable signs of ischemia on EKG  She does have a lactic acid of 3.3, no white count, afebrile, will cover with broad-spectrum antibiotics.  Will hold off on aggressive hydration for patient's lactic acid secondary to concern for hypervolemia.  History of aortic valve stenosis, CAD  Have consulted intensivist to evaluate for admission and ventilator management, requests that we involve cardiology as well, cardiology aware    ED Course as of 06/29/24 1210   Sat Jun 29, 2024   1051 EKG interpretation: Suspect sinus tachycardia, rate of 120 some artifact present, occasional PACs/PVC [BR]      ED Course User Index  [BR] Taco Villarreal R, DO     1 or more acute illnesses that pose a threat to life or bodily function.   1 or more chronic illnesses with a severe exacerbation or progression.  Parental controlled substances given in the ED.  Drug therapy given requiring intensive monitoring for toxicity.  Discussion with external consultants.  Chronic medical problems impacting care include Aortic valve stenosis, CAD.  Shared medical decision making was utilized in creating the patients health plan today.    I independently ordered and reviewed each unique test.  I reviewed external records: ED visit note from an outside group.  I reviewed external records: provider visit note from PCP.  I reviewed external records: provider visit note from outside specialist.  I reviewed

## 2024-06-29 NOTE — H&P
[facility] Cardiology Initial Cardiac Evaluation                 Date of  Admission: 6/29/2024 10:42 AM     Primary Care Physician: Herbert Jeter MD  Primary Cardiologist: Dr Zuniga  Referring Physician: Dr Villarreal  Attending Physician: Dr Martin    CC: respiratory failure      Tanika Medina is a 78 y.o. female admitted for respiratory failure.  She has a h/o CAD w 12/2021 LHC w patent stent in the LAD, 12/2021 echo w EF 55-60%, IVCD, mild AS, mod AR, mild LAE, mod MR.  Possible  DIANE declined sleep study- reported several apneic episodes to PCP.  Recently dx PUD. Presented to the ER after EMS called for respiratory distress, intubated en route.     In ER , K 3.9, cr .83, mag 1.7, pBNP 2100, HS trop 22, albumin 3.2, CBC wnl, blood culture and urine pending, CXR mild pulmonary congestion. /88, .  EKG ST w rate 120 w L BBB (old). Being admitted by the intensivist, given IV lasix, started on antibiotics. Will order STAT echo.     Pt intubated, sedated, history from  and grandson at bedside.  Pt and her  are minimally active, not eating well, needing a lot of assistance from their grandson.  Pt has had increased LE edema for a week, decreased PO intake. Yesterday she was driving back from [place] and told her grandson she was very sick but she could not specify what symptoms except she felt hot and clammy and cold, had no appetite and did not eat- thought symptoms related to PUD?  She insisted she needed to rest.  No mention of CP or SOB.  Today she had sudden onset of difficulty breathing and anxiety.  CT ordered per pulmonary. Unable to obtain further HPI due to AMS.       Patient Active Problem List   Diagnosis    Arrhythmia    S/P PTCA (percutaneous transluminal coronary angioplasty)    Arthritis    Mitral valve insufficiency    Aortic valve insufficiency    Dyslipidemia    Nonrheumatic aortic valve stenosis    SUMNER (dyspnea on exertion)    Coronary atherosclerosis of native coronary

## 2024-06-29 NOTE — H&P
HISTORY AND PHYSICAL  Tanika Medina  2024   Date of Admission:  2024    The patient's chart is reviewed and the patient is discussed with the staff.    Subjective:     Patient is a 78 y.o. female presents to [facility] via EMS.  Upon arrival, she was tripoding with severe shortness of breath and respiratory distress.  CPAP was trialed with mild improvement but became fatigued and less responsive.  She was intubated in the field and brought to the hospital.  Past medical history includes anxiety, arthritis, chronic pain, CAD post stenting in , GERD, hypothyroidism, suspected sleep apnea.   workup in the ED included ABG showing pH 7.35/CO2 45.7/pO2 231/bicarb 25.4, normal white count, unremarkable CMP, troponin mildly elevated, BNP 2100, Pro-Eitan negative.  Lactic acid elevated 3.3.  Chest x-ray shows increased haziness on the right, mild pulmonary vascular congestion, ET tube just above the ceasar.     at bedside and provides history.  States over the last month she has had episodic episodes of shortness of breath.  She has noticed that one of her ankles has been more swollen than the other, but both legs have been swollen.  She reported fatigue, and felt like she had an upset stomach.  's denies any known vomiting, fevers, illness, or reports of chest pain.  She does have a history of chronic pain and is on Norco and gabapentin at home.  She traveled to [place] yesterday and back for a .  Patient reports went to bed and seemed okay and woke up this morning with significant shortness of breath and severe anxiety.    Review of Systems:  Unable to obtain due to patient factors.     Current Outpatient Medications   Medication Instructions    aluminum hydroxide-magnesium carbonate (GENATON)  MG/15ML suspension 15 mLs, Oral, EVERY 6 HOURS PRN    amoxicillin (AMOXIL) 500 MG capsule Dental procedures    BIOTIN PO

## 2024-06-30 ENCOUNTER — APPOINTMENT (OUTPATIENT)
Dept: CARDIAC CATH/INVASIVE PROCEDURES | Age: 79
DRG: 212 | End: 2024-06-30
Attending: INTERNAL MEDICINE
Payer: MEDICARE

## 2024-06-30 ENCOUNTER — APPOINTMENT (OUTPATIENT)
Dept: GENERAL RADIOLOGY | Age: 79
DRG: 212 | End: 2024-06-30
Payer: MEDICARE

## 2024-06-30 LAB
ANION GAP SERPL CALC-SCNC: 12 MMOL/L (ref 9–18)
ARTERIAL PATENCY WRIST A: POSITIVE
BASE EXCESS BLD CALC-SCNC: 4.2 MMOL/L
BASOPHILS # BLD: 0.1 K/UL (ref 0–0.2)
BASOPHILS NFR BLD: 1 % (ref 0–2)
BDY SITE: ABNORMAL
BUN SERPL-MCNC: 11 MG/DL (ref 8–23)
CALCIUM SERPL-MCNC: 8.9 MG/DL (ref 8.8–10.2)
CHLORIDE SERPL-SCNC: 102 MMOL/L (ref 98–107)
CO2 SERPL-SCNC: 23 MMOL/L (ref 20–28)
CREAT SERPL-MCNC: 0.74 MG/DL (ref 0.6–1.1)
DIFFERENTIAL METHOD BLD: ABNORMAL
EKG ATRIAL RATE: 68 BPM
EKG DIAGNOSIS: NORMAL
EKG P AXIS: 71 DEGREES
EKG P-R INTERVAL: 192 MS
EKG Q-T INTERVAL: 502 MS
EKG QRS DURATION: 122 MS
EKG QTC CALCULATION (BAZETT): 533 MS
EKG R AXIS: 72 DEGREES
EKG T AXIS: 220 DEGREES
EKG VENTRICULAR RATE: 68 BPM
EOSINOPHIL # BLD: 0.1 K/UL (ref 0–0.8)
EOSINOPHIL NFR BLD: 1 % (ref 0.5–7.8)
ERYTHROCYTE [DISTWIDTH] IN BLOOD BY AUTOMATED COUNT: 13.1 % (ref 11.9–14.6)
GAS FLOW.O2 O2 DELIVERY SYS: ABNORMAL
GLUCOSE SERPL-MCNC: 114 MG/DL (ref 70–99)
HCO3 BLD-SCNC: 27.8 MMOL/L (ref 22–26)
HCT VFR BLD AUTO: 39.8 % (ref 35.8–46.3)
HGB BLD-MCNC: 13 G/DL (ref 11.7–15.4)
IMM GRANULOCYTES # BLD AUTO: 0 K/UL (ref 0–0.5)
IMM GRANULOCYTES NFR BLD AUTO: 0 % (ref 0–5)
IPAP/PIP/HIGH PEEP: 31
LYMPHOCYTES # BLD: 2.3 K/UL (ref 0.5–4.6)
LYMPHOCYTES NFR BLD: 20 % (ref 13–44)
MAGNESIUM SERPL-MCNC: 2.2 MG/DL (ref 1.8–2.4)
MCH RBC QN AUTO: 29.9 PG (ref 26.1–32.9)
MCHC RBC AUTO-ENTMCNC: 32.7 G/DL (ref 31.4–35)
MCV RBC AUTO: 91.5 FL (ref 82–102)
MONOCYTES # BLD: 0.8 K/UL (ref 0.1–1.3)
MONOCYTES NFR BLD: 7 % (ref 4–12)
NEUTS SEG # BLD: 8.3 K/UL (ref 1.7–8.2)
NEUTS SEG NFR BLD: 72 % (ref 43–78)
NRBC # BLD: 0 K/UL (ref 0–0.2)
O2/TOTAL GAS SETTING VFR VENT: 35 %
PCO2 BLD: 37.3 MMHG (ref 35–45)
PEEP RESPIRATORY: 8 CMH2O
PH BLD: 7.48 (ref 7.35–7.45)
PLATELET # BLD AUTO: 176 K/UL (ref 150–450)
PMV BLD AUTO: 10.3 FL (ref 9.4–12.3)
PO2 BLD: 103 MMHG (ref 75–100)
POTASSIUM SERPL-SCNC: 3.6 MMOL/L (ref 3.5–5.1)
RBC # BLD AUTO: 4.35 M/UL (ref 4.05–5.2)
RESPIRATORY RATE, POC: 18 (ref 5–40)
SAO2 % BLD: 98.4 % (ref 95–98)
SERVICE CMNT-IMP: ABNORMAL
SERVICE CMNT-IMP: ABNORMAL
SODIUM SERPL-SCNC: 138 MMOL/L (ref 136–145)
SPECIMEN TYPE: ABNORMAL
VENTILATION MODE VENT: ABNORMAL
VT SETTING VENT: 450 ML
WBC # BLD AUTO: 11.6 K/UL (ref 4.3–11.1)

## 2024-06-30 PROCEDURE — 85025 COMPLETE CBC W/AUTO DIFF WBC: CPT

## 2024-06-30 PROCEDURE — 6370000000 HC RX 637 (ALT 250 FOR IP): Performed by: INTERNAL MEDICINE

## 2024-06-30 PROCEDURE — 93320 DOPPLER ECHO COMPLETE: CPT | Performed by: INTERNAL MEDICINE

## 2024-06-30 PROCEDURE — 6360000002 HC RX W HCPCS: Performed by: NURSE PRACTITIONER

## 2024-06-30 PROCEDURE — 6360000002 HC RX W HCPCS: Performed by: PHYSICIAN ASSISTANT

## 2024-06-30 PROCEDURE — 71045 X-RAY EXAM CHEST 1 VIEW: CPT

## 2024-06-30 PROCEDURE — 93312 ECHO TRANSESOPHAGEAL: CPT | Performed by: INTERNAL MEDICINE

## 2024-06-30 PROCEDURE — 2709999900 HC NON-CHARGEABLE SUPPLY: Performed by: INTERNAL MEDICINE

## 2024-06-30 PROCEDURE — 93312 ECHO TRANSESOPHAGEAL: CPT

## 2024-06-30 PROCEDURE — 94003 VENT MGMT INPAT SUBQ DAY: CPT

## 2024-06-30 PROCEDURE — 6360000002 HC RX W HCPCS: Performed by: INTERNAL MEDICINE

## 2024-06-30 PROCEDURE — 82803 BLOOD GASES ANY COMBINATION: CPT

## 2024-06-30 PROCEDURE — 2000000000 HC ICU R&B

## 2024-06-30 PROCEDURE — C1751 CATH, INF, PER/CENT/MIDLINE: HCPCS | Performed by: INTERNAL MEDICINE

## 2024-06-30 PROCEDURE — C1894 INTRO/SHEATH, NON-LASER: HCPCS | Performed by: INTERNAL MEDICINE

## 2024-06-30 PROCEDURE — 4A023N8 MEASUREMENT OF CARDIAC SAMPLING AND PRESSURE, BILATERAL, PERCUTANEOUS APPROACH: ICD-10-PCS | Performed by: INTERNAL MEDICINE

## 2024-06-30 PROCEDURE — B2151ZZ FLUOROSCOPY OF LEFT HEART USING LOW OSMOLAR CONTRAST: ICD-10-PCS | Performed by: INTERNAL MEDICINE

## 2024-06-30 PROCEDURE — 93460 R&L HRT ART/VENTRICLE ANGIO: CPT | Performed by: INTERNAL MEDICINE

## 2024-06-30 PROCEDURE — 36600 WITHDRAWAL OF ARTERIAL BLOOD: CPT

## 2024-06-30 PROCEDURE — 2580000003 HC RX 258: Performed by: INTERNAL MEDICINE

## 2024-06-30 PROCEDURE — 36415 COLL VENOUS BLD VENIPUNCTURE: CPT

## 2024-06-30 PROCEDURE — 80048 BASIC METABOLIC PNL TOTAL CA: CPT

## 2024-06-30 PROCEDURE — 2580000003 HC RX 258: Performed by: NURSE PRACTITIONER

## 2024-06-30 PROCEDURE — 99232 SBSQ HOSP IP/OBS MODERATE 35: CPT | Performed by: INTERNAL MEDICINE

## 2024-06-30 PROCEDURE — C1760 CLOSURE DEV, VASC: HCPCS | Performed by: INTERNAL MEDICINE

## 2024-06-30 PROCEDURE — 93010 ELECTROCARDIOGRAM REPORT: CPT | Performed by: INTERNAL MEDICINE

## 2024-06-30 PROCEDURE — 83735 ASSAY OF MAGNESIUM: CPT

## 2024-06-30 PROCEDURE — 2500000003 HC RX 250 WO HCPCS: Performed by: INTERNAL MEDICINE

## 2024-06-30 PROCEDURE — 2500000003 HC RX 250 WO HCPCS: Performed by: NURSE PRACTITIONER

## 2024-06-30 PROCEDURE — B2111ZZ FLUOROSCOPY OF MULTIPLE CORONARY ARTERIES USING LOW OSMOLAR CONTRAST: ICD-10-PCS | Performed by: INTERNAL MEDICINE

## 2024-06-30 PROCEDURE — 93319 3D ECHO IMG CGEN CAR ANOMAL: CPT | Performed by: INTERNAL MEDICINE

## 2024-06-30 PROCEDURE — 99291 CRITICAL CARE FIRST HOUR: CPT | Performed by: INTERNAL MEDICINE

## 2024-06-30 RX ORDER — LISINOPRIL 5 MG/1
5 TABLET ORAL DAILY
Status: DISCONTINUED | OUTPATIENT
Start: 2024-06-30 | End: 2024-07-01

## 2024-06-30 RX ORDER — ASPIRIN 300 MG/1
300 SUPPOSITORY RECTAL
Status: COMPLETED | OUTPATIENT
Start: 2024-06-30 | End: 2024-06-30

## 2024-06-30 RX ORDER — HYDRALAZINE HYDROCHLORIDE 25 MG/1
25 TABLET, FILM COATED ORAL EVERY 8 HOURS SCHEDULED
Status: DISCONTINUED | OUTPATIENT
Start: 2024-06-30 | End: 2024-07-01

## 2024-06-30 RX ORDER — LIDOCAINE HYDROCHLORIDE 10 MG/ML
INJECTION, SOLUTION INFILTRATION; PERINEURAL PRN
Status: DISCONTINUED | OUTPATIENT
Start: 2024-06-30 | End: 2024-06-30 | Stop reason: HOSPADM

## 2024-06-30 RX ADMIN — Medication 50 MCG: at 02:31

## 2024-06-30 RX ADMIN — FAMOTIDINE 20 MG: 10 INJECTION, SOLUTION INTRAVENOUS at 21:06

## 2024-06-30 RX ADMIN — PROPOFOL 30 MCG/KG/MIN: 10 INJECTION, EMULSION INTRAVENOUS at 20:32

## 2024-06-30 RX ADMIN — FUROSEMIDE 40 MG: 10 INJECTION, SOLUTION INTRAMUSCULAR; INTRAVENOUS at 08:49

## 2024-06-30 RX ADMIN — SODIUM CHLORIDE, PRESERVATIVE FREE 10 ML: 5 INJECTION INTRAVENOUS at 08:50

## 2024-06-30 RX ADMIN — HYDRALAZINE HYDROCHLORIDE 10 MG: 10 TABLET ORAL at 04:57

## 2024-06-30 RX ADMIN — PROPOFOL 25 MCG/KG/MIN: 10 INJECTION, EMULSION INTRAVENOUS at 08:48

## 2024-06-30 RX ADMIN — Medication 50 MCG: at 08:42

## 2024-06-30 RX ADMIN — Medication 50 MCG: at 06:28

## 2024-06-30 RX ADMIN — ASPIRIN 300 MG: 300 SUPPOSITORY RECTAL at 09:04

## 2024-06-30 RX ADMIN — PROPOFOL 25 MCG/KG/MIN: 10 INJECTION, EMULSION INTRAVENOUS at 00:56

## 2024-06-30 RX ADMIN — ENOXAPARIN SODIUM 40 MG: 100 INJECTION SUBCUTANEOUS at 15:47

## 2024-06-30 RX ADMIN — FAMOTIDINE 20 MG: 10 INJECTION, SOLUTION INTRAVENOUS at 08:48

## 2024-06-30 RX ADMIN — FENTANYL CITRATE 50 MCG/HR: 50 INJECTION, SOLUTION INTRAMUSCULAR; INTRAVENOUS at 09:58

## 2024-06-30 RX ADMIN — Medication 50 MCG: at 22:57

## 2024-06-30 RX ADMIN — SODIUM CHLORIDE, PRESERVATIVE FREE 10 ML: 5 INJECTION INTRAVENOUS at 21:15

## 2024-06-30 RX ADMIN — HYDRALAZINE HYDROCHLORIDE 25 MG: 25 TABLET ORAL at 21:16

## 2024-06-30 RX ADMIN — METOPROLOL TARTRATE 12.5 MG: 25 TABLET, FILM COATED ORAL at 08:48

## 2024-06-30 RX ADMIN — PROPOFOL 30 MCG/KG/MIN: 10 INJECTION, EMULSION INTRAVENOUS at 15:21

## 2024-06-30 RX ADMIN — Medication 50 MCG: at 15:00

## 2024-07-01 ENCOUNTER — APPOINTMENT (OUTPATIENT)
Dept: GENERAL RADIOLOGY | Age: 79
DRG: 212 | End: 2024-07-01
Payer: MEDICARE

## 2024-07-01 LAB
ANION GAP SERPL CALC-SCNC: 13 MMOL/L (ref 9–18)
ARTERIAL PATENCY WRIST A: POSITIVE
BASE EXCESS BLD CALC-SCNC: 1 MMOL/L
BASOPHILS # BLD: 0.1 K/UL (ref 0–0.2)
BASOPHILS NFR BLD: 1 % (ref 0–2)
BDY SITE: ABNORMAL
BUN SERPL-MCNC: 13 MG/DL (ref 8–23)
CALCIUM SERPL-MCNC: 8.7 MG/DL (ref 8.8–10.2)
CHLORIDE SERPL-SCNC: 101 MMOL/L (ref 98–107)
CO2 SERPL-SCNC: 25 MMOL/L (ref 20–28)
CREAT SERPL-MCNC: 0.72 MG/DL (ref 0.6–1.1)
DIFFERENTIAL METHOD BLD: ABNORMAL
EOSINOPHIL # BLD: 0.2 K/UL (ref 0–0.8)
EOSINOPHIL NFR BLD: 1 % (ref 0.5–7.8)
ERYTHROCYTE [DISTWIDTH] IN BLOOD BY AUTOMATED COUNT: 13.1 % (ref 11.9–14.6)
GAS FLOW.O2 O2 DELIVERY SYS: ABNORMAL
GLUCOSE SERPL-MCNC: 96 MG/DL (ref 70–99)
HCO3 BLD-SCNC: 24.9 MMOL/L (ref 22–26)
HCT VFR BLD AUTO: 37.9 % (ref 35.8–46.3)
HGB BLD-MCNC: 12.6 G/DL (ref 11.7–15.4)
IMM GRANULOCYTES # BLD AUTO: 0 K/UL (ref 0–0.5)
IMM GRANULOCYTES NFR BLD AUTO: 0 % (ref 0–5)
LYMPHOCYTES # BLD: 2.7 K/UL (ref 0.5–4.6)
LYMPHOCYTES NFR BLD: 23 % (ref 13–44)
MAGNESIUM SERPL-MCNC: 1.7 MG/DL (ref 1.8–2.4)
MAGNESIUM SERPL-MCNC: 1.9 MG/DL (ref 1.8–2.4)
MCH RBC QN AUTO: 30.1 PG (ref 26.1–32.9)
MCHC RBC AUTO-ENTMCNC: 33.2 G/DL (ref 31.4–35)
MCV RBC AUTO: 90.5 FL (ref 82–102)
MONOCYTES # BLD: 1 K/UL (ref 0.1–1.3)
MONOCYTES NFR BLD: 8 % (ref 4–12)
NEUTS SEG # BLD: 7.8 K/UL (ref 1.7–8.2)
NEUTS SEG NFR BLD: 67 % (ref 43–78)
NRBC # BLD: 0 K/UL (ref 0–0.2)
PCO2 BLD: 36.4 MMHG (ref 35–45)
PEEP RESPIRATORY: 8 CMH2O
PH BLD: 7.44 (ref 7.35–7.45)
PLATELET # BLD AUTO: 155 K/UL (ref 150–450)
PMV BLD AUTO: 10.7 FL (ref 9.4–12.3)
PO2 BLD: 119 MMHG (ref 75–100)
POTASSIUM SERPL-SCNC: 3 MMOL/L (ref 3.5–5.1)
POTASSIUM SERPL-SCNC: 3.8 MMOL/L (ref 3.5–5.1)
RBC # BLD AUTO: 4.19 M/UL (ref 4.05–5.2)
SAO2 % BLD: 98.8 % (ref 95–98)
SERVICE CMNT-IMP: ABNORMAL
SODIUM SERPL-SCNC: 138 MMOL/L (ref 136–145)
SPECIMEN TYPE: ABNORMAL
TSH W FREE THYROID IF ABNORMAL: 3.74 UIU/ML (ref 0.27–4.2)
VENTILATION MODE VENT: ABNORMAL
VT SETTING VENT: 450 ML
WBC # BLD AUTO: 11.7 K/UL (ref 4.3–11.1)

## 2024-07-01 PROCEDURE — 82803 BLOOD GASES ANY COMBINATION: CPT

## 2024-07-01 PROCEDURE — 97535 SELF CARE MNGMENT TRAINING: CPT

## 2024-07-01 PROCEDURE — 6370000000 HC RX 637 (ALT 250 FOR IP)

## 2024-07-01 PROCEDURE — 84443 ASSAY THYROID STIM HORMONE: CPT

## 2024-07-01 PROCEDURE — 83735 ASSAY OF MAGNESIUM: CPT

## 2024-07-01 PROCEDURE — 2500000003 HC RX 250 WO HCPCS: Performed by: NURSE PRACTITIONER

## 2024-07-01 PROCEDURE — 97112 NEUROMUSCULAR REEDUCATION: CPT

## 2024-07-01 PROCEDURE — 2580000003 HC RX 258: Performed by: INTERNAL MEDICINE

## 2024-07-01 PROCEDURE — 6370000000 HC RX 637 (ALT 250 FOR IP): Performed by: FAMILY MEDICINE

## 2024-07-01 PROCEDURE — 92610 EVALUATE SWALLOWING FUNCTION: CPT

## 2024-07-01 PROCEDURE — 97165 OT EVAL LOW COMPLEX 30 MIN: CPT

## 2024-07-01 PROCEDURE — 85025 COMPLETE CBC W/AUTO DIFF WBC: CPT

## 2024-07-01 PROCEDURE — 2000000000 HC ICU R&B

## 2024-07-01 PROCEDURE — 6370000000 HC RX 637 (ALT 250 FOR IP): Performed by: INTERNAL MEDICINE

## 2024-07-01 PROCEDURE — 2580000003 HC RX 258: Performed by: NURSE PRACTITIONER

## 2024-07-01 PROCEDURE — 99233 SBSQ HOSP IP/OBS HIGH 50: CPT | Performed by: INTERNAL MEDICINE

## 2024-07-01 PROCEDURE — 97530 THERAPEUTIC ACTIVITIES: CPT

## 2024-07-01 PROCEDURE — 6360000002 HC RX W HCPCS: Performed by: NURSE PRACTITIONER

## 2024-07-01 PROCEDURE — 6360000002 HC RX W HCPCS: Performed by: INTERNAL MEDICINE

## 2024-07-01 PROCEDURE — 2580000003 HC RX 258

## 2024-07-01 PROCEDURE — 2500000003 HC RX 250 WO HCPCS

## 2024-07-01 PROCEDURE — 97162 PT EVAL MOD COMPLEX 30 MIN: CPT

## 2024-07-01 PROCEDURE — 36415 COLL VENOUS BLD VENIPUNCTURE: CPT

## 2024-07-01 PROCEDURE — 84132 ASSAY OF SERUM POTASSIUM: CPT

## 2024-07-01 PROCEDURE — 94003 VENT MGMT INPAT SUBQ DAY: CPT

## 2024-07-01 PROCEDURE — 80048 BASIC METABOLIC PNL TOTAL CA: CPT

## 2024-07-01 PROCEDURE — 5A1935Z RESPIRATORY VENTILATION, LESS THAN 24 CONSECUTIVE HOURS: ICD-10-PCS | Performed by: INTERNAL MEDICINE

## 2024-07-01 PROCEDURE — 36600 WITHDRAWAL OF ARTERIAL BLOOD: CPT

## 2024-07-01 PROCEDURE — 71045 X-RAY EXAM CHEST 1 VIEW: CPT

## 2024-07-01 RX ORDER — MAGNESIUM SULFATE IN WATER 40 MG/ML
2000 INJECTION, SOLUTION INTRAVENOUS ONCE
Status: COMPLETED | OUTPATIENT
Start: 2024-07-01 | End: 2024-07-01

## 2024-07-01 RX ORDER — METOPROLOL SUCCINATE 25 MG/1
25 TABLET, EXTENDED RELEASE ORAL 2 TIMES DAILY
Status: DISCONTINUED | OUTPATIENT
Start: 2024-07-01 | End: 2024-07-11

## 2024-07-01 RX ORDER — DEXMEDETOMIDINE HYDROCHLORIDE 4 UG/ML
.1-1.5 INJECTION, SOLUTION INTRAVENOUS CONTINUOUS
Status: DISCONTINUED | OUTPATIENT
Start: 2024-07-01 | End: 2024-07-01

## 2024-07-01 RX ORDER — LEVOTHYROXINE SODIUM 0.07 MG/1
75 TABLET ORAL
Status: DISCONTINUED | OUTPATIENT
Start: 2024-07-02 | End: 2024-07-23 | Stop reason: HOSPADM

## 2024-07-01 RX ORDER — POTASSIUM CHLORIDE 20 MEQ/1
40 TABLET, EXTENDED RELEASE ORAL DAILY
Status: DISCONTINUED | OUTPATIENT
Start: 2024-07-02 | End: 2024-07-01

## 2024-07-01 RX ORDER — QUETIAPINE FUMARATE 25 MG/1
50 TABLET, FILM COATED ORAL ONCE
Status: COMPLETED | OUTPATIENT
Start: 2024-07-01 | End: 2024-07-01

## 2024-07-01 RX ORDER — HYDROCODONE BITARTRATE AND ACETAMINOPHEN 10; 325 MG/1; MG/1
1 TABLET ORAL EVERY 6 HOURS PRN
Status: DISCONTINUED | OUTPATIENT
Start: 2024-07-01 | End: 2024-07-11

## 2024-07-01 RX ORDER — ACETAMINOPHEN 325 MG/1
650 TABLET ORAL EVERY 6 HOURS PRN
Status: DISCONTINUED | OUTPATIENT
Start: 2024-07-01 | End: 2024-07-05

## 2024-07-01 RX ORDER — FUROSEMIDE 10 MG/ML
40 INJECTION INTRAMUSCULAR; INTRAVENOUS 2 TIMES DAILY
Status: DISCONTINUED | OUTPATIENT
Start: 2024-07-01 | End: 2024-07-07

## 2024-07-01 RX ORDER — DULOXETIN HYDROCHLORIDE 60 MG/1
60 CAPSULE, DELAYED RELEASE ORAL DAILY
Status: DISCONTINUED | OUTPATIENT
Start: 2024-07-01 | End: 2024-07-15

## 2024-07-01 RX ORDER — POTASSIUM CHLORIDE 20 MEQ/1
40 TABLET, EXTENDED RELEASE ORAL ONCE
Status: COMPLETED | OUTPATIENT
Start: 2024-07-01 | End: 2024-07-01

## 2024-07-01 RX ORDER — DEXMEDETOMIDINE HYDROCHLORIDE 4 UG/ML
.1-1.5 INJECTION, SOLUTION INTRAVENOUS CONTINUOUS
Status: DISCONTINUED | OUTPATIENT
Start: 2024-07-01 | End: 2024-07-02 | Stop reason: ALTCHOICE

## 2024-07-01 RX ORDER — SPIRONOLACTONE 25 MG/1
25 TABLET ORAL DAILY
Status: DISCONTINUED | OUTPATIENT
Start: 2024-07-01 | End: 2024-07-11

## 2024-07-01 RX ORDER — ROSUVASTATIN CALCIUM 20 MG/1
20 TABLET, COATED ORAL NIGHTLY
Status: DISCONTINUED | OUTPATIENT
Start: 2024-07-01 | End: 2024-07-23 | Stop reason: HOSPADM

## 2024-07-01 RX ADMIN — METOPROLOL SUCCINATE 25 MG: 25 TABLET, EXTENDED RELEASE ORAL at 20:49

## 2024-07-01 RX ADMIN — MAGNESIUM SULFATE HEPTAHYDRATE 2000 MG: 40 INJECTION, SOLUTION INTRAVENOUS at 17:28

## 2024-07-01 RX ADMIN — POTASSIUM CHLORIDE 40 MEQ: 1500 TABLET, EXTENDED RELEASE ORAL at 17:09

## 2024-07-01 RX ADMIN — POTASSIUM CHLORIDE 10 MEQ: 7.46 INJECTION, SOLUTION INTRAVENOUS at 09:02

## 2024-07-01 RX ADMIN — Medication 50 MCG: at 03:50

## 2024-07-01 RX ADMIN — POTASSIUM CHLORIDE 10 MEQ: 7.46 INJECTION, SOLUTION INTRAVENOUS at 07:35

## 2024-07-01 RX ADMIN — POTASSIUM CHLORIDE 10 MEQ: 7.46 INJECTION, SOLUTION INTRAVENOUS at 12:39

## 2024-07-01 RX ADMIN — SACUBITRIL AND VALSARTAN 1 TABLET: 24; 26 TABLET, FILM COATED ORAL at 11:25

## 2024-07-01 RX ADMIN — EMPAGLIFLOZIN 10 MG: 10 TABLET, FILM COATED ORAL at 11:26

## 2024-07-01 RX ADMIN — POTASSIUM CHLORIDE 10 MEQ: 7.46 INJECTION, SOLUTION INTRAVENOUS at 10:28

## 2024-07-01 RX ADMIN — DEXMEDETOMIDINE 0.4 MCG/KG/HR: 100 INJECTION, SOLUTION INTRAVENOUS at 23:03

## 2024-07-01 RX ADMIN — FAMOTIDINE 20 MG: 10 INJECTION, SOLUTION INTRAVENOUS at 09:02

## 2024-07-01 RX ADMIN — Medication 50 MCG: at 04:30

## 2024-07-01 RX ADMIN — ROSUVASTATIN CALCIUM 20 MG: 20 TABLET, FILM COATED ORAL at 20:49

## 2024-07-01 RX ADMIN — FAMOTIDINE 20 MG: 10 INJECTION, SOLUTION INTRAVENOUS at 20:50

## 2024-07-01 RX ADMIN — FENTANYL CITRATE 50 MCG/HR: 50 INJECTION, SOLUTION INTRAMUSCULAR; INTRAVENOUS at 03:49

## 2024-07-01 RX ADMIN — ACETAMINOPHEN 650 MG: 325 TABLET ORAL at 17:09

## 2024-07-01 RX ADMIN — DULOXETINE HYDROCHLORIDE 60 MG: 60 CAPSULE, DELAYED RELEASE ORAL at 11:26

## 2024-07-01 RX ADMIN — FUROSEMIDE 40 MG: 10 INJECTION, SOLUTION INTRAMUSCULAR; INTRAVENOUS at 09:03

## 2024-07-01 RX ADMIN — SPIRONOLACTONE 25 MG: 25 TABLET ORAL at 11:26

## 2024-07-01 RX ADMIN — ENOXAPARIN SODIUM 40 MG: 100 INJECTION SUBCUTANEOUS at 15:20

## 2024-07-01 RX ADMIN — FUROSEMIDE 40 MG: 10 INJECTION, SOLUTION INTRAMUSCULAR; INTRAVENOUS at 17:10

## 2024-07-01 RX ADMIN — HYDRALAZINE HYDROCHLORIDE 25 MG: 25 TABLET ORAL at 05:36

## 2024-07-01 RX ADMIN — METOPROLOL SUCCINATE 25 MG: 25 TABLET, EXTENDED RELEASE ORAL at 11:26

## 2024-07-01 RX ADMIN — DEXMEDETOMIDINE 0.2 MCG/KG/HR: 100 INJECTION, SOLUTION INTRAVENOUS at 06:08

## 2024-07-01 RX ADMIN — SODIUM CHLORIDE, PRESERVATIVE FREE 10 ML: 5 INJECTION INTRAVENOUS at 20:51

## 2024-07-01 RX ADMIN — PROPOFOL 10 MCG/KG/MIN: 10 INJECTION, EMULSION INTRAVENOUS at 04:35

## 2024-07-01 RX ADMIN — Medication 50 MCG: at 00:53

## 2024-07-01 RX ADMIN — SACUBITRIL AND VALSARTAN 1 TABLET: 24; 26 TABLET, FILM COATED ORAL at 20:49

## 2024-07-01 RX ADMIN — POTASSIUM CHLORIDE 10 MEQ: 7.46 INJECTION, SOLUTION INTRAVENOUS at 06:28

## 2024-07-01 RX ADMIN — QUETIAPINE FUMARATE 50 MG: 25 TABLET ORAL at 21:49

## 2024-07-01 RX ADMIN — SODIUM CHLORIDE, PRESERVATIVE FREE 10 ML: 5 INJECTION INTRAVENOUS at 11:27

## 2024-07-01 RX ADMIN — POTASSIUM CHLORIDE 10 MEQ: 7.46 INJECTION, SOLUTION INTRAVENOUS at 05:34

## 2024-07-01 NOTE — INTERDISCIPLINARY ROUNDS
Multi-D Rounds/Checklist (leapfrog):  Lines: can any be removed?: ETT, NG and kline  ETT  (Active)     NG/OG/NJ/NE Tube Nasogastric 14 fr Right nostril (Active)       Urinary Catheter 06/29/24 3 Way (Active)      DVT Prophylaxis: Ordered  Vent: N/A  Nutrition Ordered/appropriate: Ordered  Can antibiotics or other drugs be stopped? N/A Yes/No  Inpat Anti-Infectives (From admission, onward)      None          Consults needed: None  A: Is pain control adequate? (has PRNs? Stop drip?) Yes  B: Sedation break and SBT? Yes  C: Is sedation choice appropriate? Yes  D: Delirium/CAM-ICU? Yes  E: Mobility goals/appropriateness? Yes  F: Family update and plan?  is surrogate decision maker and is being updated daily by primary attending and nursing staff.    Marleny Figueroa, APRN - CNP

## 2024-07-01 NOTE — ACP (ADVANCE CARE PLANNING)
Advance Care Planning     Advance Care Planning Activator (Inpatient)  Conversation Note      Date of ACP Conversation: 7/1/2024     ACP Activator: Sahara Pa RN    {When Decision Maker makes decisions on behalf of the incapacitated patient: Decision Maker is asked to consider and make decisions based on patient values, known preferences, or best interests.     Health Care Decision Maker: No LW/HCPOA on file. Spouse is legal NOK unless document presented stating otherwise.     Current Designated Health Care Decision Maker:     Primary Decision Maker: Yamel Medina - Spouse - [phone]  Click here to complete Healthcare Decision Makers including section of the Healthcare Decision Maker Relationship (ie \"Primary\")  Today we documented Decision Maker(s) consistent with Legal Next of Kin hierarchy.    Care Preferences  Full code per MD orders.

## 2024-07-01 NOTE — CARE COORDINATION
Case Management Assessment  Initial Evaluation    Date/Time of Evaluation: 7/1/2024 1:08 PM  Assessment Completed by: Sahara Pa RN    If patient is discharged prior to next notation, then this note serves as note for discharge by case management.    Patient Name: Tanika Medina                   YOB: 1945  Diagnosis: Heart murmur [R01.1]  Acute respiratory failure with hypoxia (HCC) [J96.01]  Acute on chronic respiratory failure with hypoxia (HCC) [J96.21]                   Date / Time: 6/29/2024 10:42 AM    Patient Admission Status: Inpatient   Readmission Risk (Low < 19, Mod (19-27), High > 27): Readmission Risk Score: 11    Current PCP: Herbert Jeter MD  PCP verified by CM? (P) Yes    Chart Reviewed: Yes      History Provided by: (P) Child/Family  Patient Orientation: (P) Person, Self    Patient Cognition: (P) Alert    Hospitalization in the last 30 days (Readmission):  No    If yes, Readmission Assessment in  Navigator will be completed.    Advance Directives:      Code Status: Full Code   Patient's Primary Decision Maker is: (P) Legal Next of Kin    Primary Decision Maker: Yamel Medina - Spouse - [phone]    Discharge Planning:    Patient lives with: (P) Spouse/Significant Other Type of Home: (P) Other (Comment) (pending)  Primary Care Giver: (P) Self  Patient Support Systems include: (P) Spouse/Significant Other, Children, Family Members   Current Financial resources: (P) Medicare ([facility])  Current community resources: (P) None  Current services prior to admission: (P) None            Current DME:  none            Type of Home Care services:  None    ADLS  Prior functional level: (P) Independent in ADLs/IADLs  Current functional level: (P) Assistance with the following:    PT AM-PAC:   /24  OT AM-PAC:   /24    Family can provide assistance at DC: (P) No  Would you like Case Management to discuss the discharge plan with any other family members/significant others, and if so, who?

## 2024-07-02 PROBLEM — R41.82 ALTERED MENTAL STATUS: Status: ACTIVE | Noted: 2024-07-02

## 2024-07-02 PROBLEM — Z51.5 ENCOUNTER FOR PALLIATIVE CARE: Status: ACTIVE | Noted: 2024-07-02

## 2024-07-02 PROBLEM — R54 FRAILTY: Status: ACTIVE | Noted: 2024-07-02

## 2024-07-02 LAB
ANION GAP SERPL CALC-SCNC: 13 MMOL/L (ref 9–18)
BACTERIA SPEC CULT: NORMAL
BASOPHILS # BLD: 0.1 K/UL (ref 0–0.2)
BASOPHILS NFR BLD: 1 % (ref 0–2)
BUN SERPL-MCNC: 16 MG/DL (ref 8–23)
CALCIUM SERPL-MCNC: 9.2 MG/DL (ref 8.8–10.2)
CHLORIDE SERPL-SCNC: 103 MMOL/L (ref 98–107)
CO2 SERPL-SCNC: 25 MMOL/L (ref 20–28)
CREAT SERPL-MCNC: 0.73 MG/DL (ref 0.6–1.1)
DIFFERENTIAL METHOD BLD: NORMAL
EOSINOPHIL # BLD: 0.2 K/UL (ref 0–0.8)
EOSINOPHIL NFR BLD: 1 % (ref 0.5–7.8)
ERYTHROCYTE [DISTWIDTH] IN BLOOD BY AUTOMATED COUNT: 12.8 % (ref 11.9–14.6)
GLUCOSE SERPL-MCNC: 92 MG/DL (ref 70–99)
HCT VFR BLD AUTO: 40.5 % (ref 35.8–46.3)
HGB BLD-MCNC: 13.3 G/DL (ref 11.7–15.4)
IMM GRANULOCYTES # BLD AUTO: 0 K/UL (ref 0–0.5)
IMM GRANULOCYTES NFR BLD AUTO: 0 % (ref 0–5)
LYMPHOCYTES # BLD: 1.4 K/UL (ref 0.5–4.6)
LYMPHOCYTES NFR BLD: 13 % (ref 13–44)
MAGNESIUM SERPL-MCNC: 2.5 MG/DL (ref 1.8–2.4)
MCH RBC QN AUTO: 29.6 PG (ref 26.1–32.9)
MCHC RBC AUTO-ENTMCNC: 32.8 G/DL (ref 31.4–35)
MCV RBC AUTO: 90.2 FL (ref 82–102)
MONOCYTES # BLD: 1.1 K/UL (ref 0.1–1.3)
MONOCYTES NFR BLD: 10 % (ref 4–12)
NEUTS SEG # BLD: 7.9 K/UL (ref 1.7–8.2)
NEUTS SEG NFR BLD: 75 % (ref 43–78)
NRBC # BLD: 0 K/UL (ref 0–0.2)
PLATELET # BLD AUTO: 195 K/UL (ref 150–450)
PMV BLD AUTO: 10.2 FL (ref 9.4–12.3)
POTASSIUM SERPL-SCNC: 4.1 MMOL/L (ref 3.5–5.1)
RBC # BLD AUTO: 4.49 M/UL (ref 4.05–5.2)
SERVICE CMNT-IMP: NORMAL
SODIUM SERPL-SCNC: 141 MMOL/L (ref 136–145)
WBC # BLD AUTO: 10.7 K/UL (ref 4.3–11.1)

## 2024-07-02 PROCEDURE — 99222 1ST HOSP IP/OBS MODERATE 55: CPT | Performed by: INTERNAL MEDICINE

## 2024-07-02 PROCEDURE — 6360000002 HC RX W HCPCS: Performed by: NURSE PRACTITIONER

## 2024-07-02 PROCEDURE — 6370000000 HC RX 637 (ALT 250 FOR IP): Performed by: FAMILY MEDICINE

## 2024-07-02 PROCEDURE — 6370000000 HC RX 637 (ALT 250 FOR IP): Performed by: INTERNAL MEDICINE

## 2024-07-02 PROCEDURE — 85025 COMPLETE CBC W/AUTO DIFF WBC: CPT

## 2024-07-02 PROCEDURE — 1100000003 HC PRIVATE W/ TELEMETRY

## 2024-07-02 PROCEDURE — 83735 ASSAY OF MAGNESIUM: CPT

## 2024-07-02 PROCEDURE — 6360000002 HC RX W HCPCS: Performed by: INTERNAL MEDICINE

## 2024-07-02 PROCEDURE — 2500000003 HC RX 250 WO HCPCS: Performed by: NURSE PRACTITIONER

## 2024-07-02 PROCEDURE — 92526 ORAL FUNCTION THERAPY: CPT

## 2024-07-02 PROCEDURE — 80048 BASIC METABOLIC PNL TOTAL CA: CPT

## 2024-07-02 PROCEDURE — 2580000003 HC RX 258: Performed by: NURSE PRACTITIONER

## 2024-07-02 PROCEDURE — 99233 SBSQ HOSP IP/OBS HIGH 50: CPT | Performed by: INTERNAL MEDICINE

## 2024-07-02 PROCEDURE — 36415 COLL VENOUS BLD VENIPUNCTURE: CPT

## 2024-07-02 PROCEDURE — 2400000000

## 2024-07-02 RX ADMIN — FAMOTIDINE 20 MG: 10 INJECTION, SOLUTION INTRAVENOUS at 09:07

## 2024-07-02 RX ADMIN — METOPROLOL SUCCINATE 25 MG: 25 TABLET, EXTENDED RELEASE ORAL at 09:08

## 2024-07-02 RX ADMIN — SODIUM CHLORIDE, PRESERVATIVE FREE 10 ML: 5 INJECTION INTRAVENOUS at 07:27

## 2024-07-02 RX ADMIN — DULOXETINE HYDROCHLORIDE 60 MG: 60 CAPSULE, DELAYED RELEASE ORAL at 09:08

## 2024-07-02 RX ADMIN — FUROSEMIDE 40 MG: 10 INJECTION, SOLUTION INTRAMUSCULAR; INTRAVENOUS at 18:24

## 2024-07-02 RX ADMIN — SPIRONOLACTONE 25 MG: 25 TABLET ORAL at 09:08

## 2024-07-02 RX ADMIN — EMPAGLIFLOZIN 10 MG: 10 TABLET, FILM COATED ORAL at 09:08

## 2024-07-02 RX ADMIN — LEVOTHYROXINE SODIUM 75 MCG: 0.07 TABLET ORAL at 07:27

## 2024-07-02 RX ADMIN — ROSUVASTATIN CALCIUM 20 MG: 20 TABLET, FILM COATED ORAL at 20:17

## 2024-07-02 RX ADMIN — ENOXAPARIN SODIUM 40 MG: 100 INJECTION SUBCUTANEOUS at 16:37

## 2024-07-02 RX ADMIN — METOPROLOL SUCCINATE 25 MG: 25 TABLET, EXTENDED RELEASE ORAL at 20:17

## 2024-07-02 RX ADMIN — FUROSEMIDE 40 MG: 10 INJECTION, SOLUTION INTRAMUSCULAR; INTRAVENOUS at 09:07

## 2024-07-02 RX ADMIN — SACUBITRIL AND VALSARTAN 1 TABLET: 24; 26 TABLET, FILM COATED ORAL at 09:08

## 2024-07-02 NOTE — INTERDISCIPLINARY ROUNDS
Multi-D Rounds/Checklist (leapfrog):  Lines: can any be removed?: None    External Urinary Catheter (Active)      DVT Prophylaxis: Ordered  Vent: N/A  Nutrition Ordered/appropriate: Ordered  Can antibiotics or other drugs be stopped? N/A Yes/No  Inpat Anti-Infectives (From admission, onward)      None          Consults needed: None  A: Is pain control adequate? (has PRNs? Stop drip?) Yes  B: Sedation break and SBT? N/A  C: Is sedation choice appropriate? N/A  D: Delirium/CAM-ICU? Yes  E: Mobility goals/appropriateness? Yes  F: Family update and plan?  is surrogate decision maker and is being updated daily by primary attending and nursing staff.    Marleny Figueroa, APRN - CNP

## 2024-07-03 LAB
ANION GAP SERPL CALC-SCNC: 12 MMOL/L (ref 9–18)
BUN SERPL-MCNC: 17 MG/DL (ref 8–23)
CALCIUM SERPL-MCNC: 9.4 MG/DL (ref 8.8–10.2)
CHLORIDE SERPL-SCNC: 99 MMOL/L (ref 98–107)
CO2 SERPL-SCNC: 28 MMOL/L (ref 20–28)
CREAT SERPL-MCNC: 0.79 MG/DL (ref 0.6–1.1)
GLUCOSE SERPL-MCNC: 113 MG/DL (ref 70–99)
MAGNESIUM SERPL-MCNC: 2.3 MG/DL (ref 1.8–2.4)
POTASSIUM SERPL-SCNC: 3.8 MMOL/L (ref 3.5–5.1)
SODIUM SERPL-SCNC: 139 MMOL/L (ref 136–145)

## 2024-07-03 PROCEDURE — 97530 THERAPEUTIC ACTIVITIES: CPT

## 2024-07-03 PROCEDURE — 97535 SELF CARE MNGMENT TRAINING: CPT

## 2024-07-03 PROCEDURE — 36415 COLL VENOUS BLD VENIPUNCTURE: CPT

## 2024-07-03 PROCEDURE — 6370000000 HC RX 637 (ALT 250 FOR IP): Performed by: INTERNAL MEDICINE

## 2024-07-03 PROCEDURE — 83735 ASSAY OF MAGNESIUM: CPT

## 2024-07-03 PROCEDURE — 80048 BASIC METABOLIC PNL TOTAL CA: CPT

## 2024-07-03 PROCEDURE — 2580000003 HC RX 258: Performed by: NURSE PRACTITIONER

## 2024-07-03 PROCEDURE — 97112 NEUROMUSCULAR REEDUCATION: CPT

## 2024-07-03 PROCEDURE — 6370000000 HC RX 637 (ALT 250 FOR IP): Performed by: FAMILY MEDICINE

## 2024-07-03 PROCEDURE — 6360000002 HC RX W HCPCS: Performed by: NURSE PRACTITIONER

## 2024-07-03 PROCEDURE — 6370000000 HC RX 637 (ALT 250 FOR IP): Performed by: NURSE PRACTITIONER

## 2024-07-03 PROCEDURE — 1100000003 HC PRIVATE W/ TELEMETRY

## 2024-07-03 PROCEDURE — 6360000002 HC RX W HCPCS: Performed by: INTERNAL MEDICINE

## 2024-07-03 PROCEDURE — 99232 SBSQ HOSP IP/OBS MODERATE 35: CPT | Performed by: INTERNAL MEDICINE

## 2024-07-03 RX ORDER — TRAZODONE HYDROCHLORIDE 50 MG/1
50 TABLET ORAL NIGHTLY
Status: DISCONTINUED | OUTPATIENT
Start: 2024-07-03 | End: 2024-07-03

## 2024-07-03 RX ORDER — TRAZODONE HYDROCHLORIDE 50 MG/1
50 TABLET ORAL NIGHTLY PRN
Status: DISCONTINUED | OUTPATIENT
Start: 2024-07-03 | End: 2024-07-03

## 2024-07-03 RX ORDER — QUETIAPINE FUMARATE 25 MG/1
25 TABLET, FILM COATED ORAL NIGHTLY PRN
Status: DISCONTINUED | OUTPATIENT
Start: 2024-07-03 | End: 2024-07-15

## 2024-07-03 RX ORDER — FAMOTIDINE 20 MG/1
20 TABLET, FILM COATED ORAL 2 TIMES DAILY
Status: DISCONTINUED | OUTPATIENT
Start: 2024-07-03 | End: 2024-07-11

## 2024-07-03 RX ADMIN — HYDROCODONE BITARTRATE AND ACETAMINOPHEN 1 TABLET: 10; 325 TABLET ORAL at 13:25

## 2024-07-03 RX ADMIN — FUROSEMIDE 40 MG: 10 INJECTION, SOLUTION INTRAMUSCULAR; INTRAVENOUS at 08:35

## 2024-07-03 RX ADMIN — ROSUVASTATIN CALCIUM 20 MG: 20 TABLET, FILM COATED ORAL at 20:03

## 2024-07-03 RX ADMIN — SODIUM CHLORIDE, PRESERVATIVE FREE 10 ML: 5 INJECTION INTRAVENOUS at 20:03

## 2024-07-03 RX ADMIN — DULOXETINE HYDROCHLORIDE 60 MG: 60 CAPSULE, DELAYED RELEASE ORAL at 08:31

## 2024-07-03 RX ADMIN — QUETIAPINE FUMARATE 25 MG: 25 TABLET ORAL at 20:03

## 2024-07-03 RX ADMIN — SODIUM CHLORIDE, PRESERVATIVE FREE 10 ML: 5 INJECTION INTRAVENOUS at 08:32

## 2024-07-03 RX ADMIN — FUROSEMIDE 40 MG: 10 INJECTION, SOLUTION INTRAMUSCULAR; INTRAVENOUS at 18:03

## 2024-07-03 RX ADMIN — LEVOTHYROXINE SODIUM 75 MCG: 0.07 TABLET ORAL at 04:49

## 2024-07-03 RX ADMIN — HYDROCODONE BITARTRATE AND ACETAMINOPHEN 1 TABLET: 10; 325 TABLET ORAL at 02:55

## 2024-07-03 RX ADMIN — SPIRONOLACTONE 25 MG: 25 TABLET ORAL at 08:34

## 2024-07-03 RX ADMIN — METOPROLOL SUCCINATE 25 MG: 25 TABLET, EXTENDED RELEASE ORAL at 20:03

## 2024-07-03 RX ADMIN — ENOXAPARIN SODIUM 40 MG: 100 INJECTION SUBCUTANEOUS at 13:27

## 2024-07-03 RX ADMIN — METOPROLOL SUCCINATE 25 MG: 25 TABLET, EXTENDED RELEASE ORAL at 08:34

## 2024-07-03 RX ADMIN — HYDROCODONE BITARTRATE AND ACETAMINOPHEN 1 TABLET: 10; 325 TABLET ORAL at 20:03

## 2024-07-03 NOTE — CARE COORDINATION
LOS 4d  IDR and chart reviewed for Transition of Care planning.  PT/OT recommendations is for Home Health at this time.    Transition of care is home with Home Health at discharge.    Transitions of Care plan is ongoing, no further concerns as of present.   Please consult  if any new issues arise.  Will continue to follow.

## 2024-07-04 LAB
ANION GAP SERPL CALC-SCNC: 14 MMOL/L (ref 9–18)
BASOPHILS # BLD: 0.1 K/UL (ref 0–0.2)
BASOPHILS NFR BLD: 1 % (ref 0–2)
BUN SERPL-MCNC: 23 MG/DL (ref 8–23)
CALCIUM SERPL-MCNC: 9.2 MG/DL (ref 8.8–10.2)
CHLORIDE SERPL-SCNC: 96 MMOL/L (ref 98–107)
CO2 SERPL-SCNC: 26 MMOL/L (ref 20–28)
CREAT SERPL-MCNC: 0.81 MG/DL (ref 0.6–1.1)
DIFFERENTIAL METHOD BLD: NORMAL
EOSINOPHIL # BLD: 0.2 K/UL (ref 0–0.8)
EOSINOPHIL NFR BLD: 3 % (ref 0.5–7.8)
ERYTHROCYTE [DISTWIDTH] IN BLOOD BY AUTOMATED COUNT: 12.7 % (ref 11.9–14.6)
GLUCOSE SERPL-MCNC: 104 MG/DL (ref 70–99)
HCT VFR BLD AUTO: 41.1 % (ref 35.8–46.3)
HGB BLD-MCNC: 13.7 G/DL (ref 11.7–15.4)
IMM GRANULOCYTES # BLD AUTO: 0 K/UL (ref 0–0.5)
IMM GRANULOCYTES NFR BLD AUTO: 1 % (ref 0–5)
LYMPHOCYTES # BLD: 2 K/UL (ref 0.5–4.6)
LYMPHOCYTES NFR BLD: 26 % (ref 13–44)
MAGNESIUM SERPL-MCNC: 2.1 MG/DL (ref 1.8–2.4)
MCH RBC QN AUTO: 29.2 PG (ref 26.1–32.9)
MCHC RBC AUTO-ENTMCNC: 33.3 G/DL (ref 31.4–35)
MCV RBC AUTO: 87.6 FL (ref 82–102)
MONOCYTES # BLD: 0.9 K/UL (ref 0.1–1.3)
MONOCYTES NFR BLD: 12 % (ref 4–12)
NEUTS SEG # BLD: 4.5 K/UL (ref 1.7–8.2)
NEUTS SEG NFR BLD: 57 % (ref 43–78)
NRBC # BLD: 0 K/UL (ref 0–0.2)
PLATELET # BLD AUTO: 261 K/UL (ref 150–450)
PMV BLD AUTO: 10.3 FL (ref 9.4–12.3)
POTASSIUM SERPL-SCNC: 3 MMOL/L (ref 3.5–5.1)
RBC # BLD AUTO: 4.69 M/UL (ref 4.05–5.2)
SODIUM SERPL-SCNC: 136 MMOL/L (ref 136–145)
WBC # BLD AUTO: 7.7 K/UL (ref 4.3–11.1)

## 2024-07-04 PROCEDURE — 6370000000 HC RX 637 (ALT 250 FOR IP): Performed by: INTERNAL MEDICINE

## 2024-07-04 PROCEDURE — 83735 ASSAY OF MAGNESIUM: CPT

## 2024-07-04 PROCEDURE — 6370000000 HC RX 637 (ALT 250 FOR IP): Performed by: NURSE PRACTITIONER

## 2024-07-04 PROCEDURE — 99232 SBSQ HOSP IP/OBS MODERATE 35: CPT | Performed by: INTERNAL MEDICINE

## 2024-07-04 PROCEDURE — 1100000003 HC PRIVATE W/ TELEMETRY

## 2024-07-04 PROCEDURE — 2580000003 HC RX 258: Performed by: INTERNAL MEDICINE

## 2024-07-04 PROCEDURE — 6360000002 HC RX W HCPCS: Performed by: INTERNAL MEDICINE

## 2024-07-04 PROCEDURE — 85025 COMPLETE CBC W/AUTO DIFF WBC: CPT

## 2024-07-04 PROCEDURE — 36415 COLL VENOUS BLD VENIPUNCTURE: CPT

## 2024-07-04 PROCEDURE — 80048 BASIC METABOLIC PNL TOTAL CA: CPT

## 2024-07-04 RX ORDER — POTASSIUM CHLORIDE 20 MEQ/1
40 TABLET, EXTENDED RELEASE ORAL ONCE
Status: COMPLETED | OUTPATIENT
Start: 2024-07-04 | End: 2024-07-04

## 2024-07-04 RX ADMIN — ROSUVASTATIN CALCIUM 20 MG: 20 TABLET, FILM COATED ORAL at 19:56

## 2024-07-04 RX ADMIN — SPIRONOLACTONE 25 MG: 25 TABLET ORAL at 08:42

## 2024-07-04 RX ADMIN — FUROSEMIDE 40 MG: 10 INJECTION, SOLUTION INTRAMUSCULAR; INTRAVENOUS at 18:21

## 2024-07-04 RX ADMIN — SODIUM CHLORIDE, PRESERVATIVE FREE 10 ML: 5 INJECTION INTRAVENOUS at 08:43

## 2024-07-04 RX ADMIN — FAMOTIDINE 20 MG: 20 TABLET, FILM COATED ORAL at 19:56

## 2024-07-04 RX ADMIN — LEVOTHYROXINE SODIUM 75 MCG: 0.07 TABLET ORAL at 08:43

## 2024-07-04 RX ADMIN — METOPROLOL SUCCINATE 25 MG: 25 TABLET, EXTENDED RELEASE ORAL at 08:42

## 2024-07-04 RX ADMIN — POTASSIUM CHLORIDE 10 MEQ: 7.46 INJECTION, SOLUTION INTRAVENOUS at 12:00

## 2024-07-04 RX ADMIN — POTASSIUM CHLORIDE 40 MEQ: 1500 TABLET, EXTENDED RELEASE ORAL at 13:47

## 2024-07-04 RX ADMIN — ENOXAPARIN SODIUM 40 MG: 100 INJECTION SUBCUTANEOUS at 13:48

## 2024-07-04 RX ADMIN — FAMOTIDINE 20 MG: 20 TABLET, FILM COATED ORAL at 08:42

## 2024-07-04 RX ADMIN — QUETIAPINE FUMARATE 25 MG: 25 TABLET ORAL at 19:56

## 2024-07-04 RX ADMIN — DULOXETINE HYDROCHLORIDE 60 MG: 60 CAPSULE, DELAYED RELEASE ORAL at 08:43

## 2024-07-04 RX ADMIN — SODIUM CHLORIDE, PRESERVATIVE FREE 10 ML: 5 INJECTION INTRAVENOUS at 19:56

## 2024-07-04 RX ADMIN — HYDROCODONE BITARTRATE AND ACETAMINOPHEN 1 TABLET: 10; 325 TABLET ORAL at 09:40

## 2024-07-04 RX ADMIN — FUROSEMIDE 40 MG: 10 INJECTION, SOLUTION INTRAMUSCULAR; INTRAVENOUS at 08:43

## 2024-07-04 RX ADMIN — METOPROLOL SUCCINATE 25 MG: 25 TABLET, EXTENDED RELEASE ORAL at 19:56

## 2024-07-04 RX ADMIN — POTASSIUM CHLORIDE 40 MEQ: 1500 TABLET, EXTENDED RELEASE ORAL at 16:53

## 2024-07-04 RX ADMIN — HYDROCODONE BITARTRATE AND ACETAMINOPHEN 1 TABLET: 10; 325 TABLET ORAL at 19:56

## 2024-07-05 ENCOUNTER — APPOINTMENT (OUTPATIENT)
Dept: CARDIAC CATH/INVASIVE PROCEDURES | Age: 79
DRG: 212 | End: 2024-07-05
Attending: INTERNAL MEDICINE
Payer: MEDICARE

## 2024-07-05 LAB
ALBUMIN SERPL-MCNC: 3.6 G/DL (ref 3.2–4.6)
ALBUMIN/GLOB SERPL: 0.9 (ref 1–1.9)
ALP SERPL-CCNC: 66 U/L (ref 35–104)
ALT SERPL-CCNC: 18 U/L (ref 12–65)
ANION GAP SERPL CALC-SCNC: 11 MMOL/L (ref 9–18)
AST SERPL-CCNC: 30 U/L (ref 15–37)
BACTERIA SPEC CULT: NORMAL
BACTERIA SPEC CULT: NORMAL
BASOPHILS # BLD: 0.1 K/UL (ref 0–0.2)
BASOPHILS NFR BLD: 1 % (ref 0–2)
BILIRUB SERPL-MCNC: 0.4 MG/DL (ref 0–1.2)
BUN SERPL-MCNC: 21 MG/DL (ref 8–23)
CALCIUM SERPL-MCNC: 9.7 MG/DL (ref 8.8–10.2)
CHLORIDE SERPL-SCNC: 101 MMOL/L (ref 98–107)
CO2 SERPL-SCNC: 26 MMOL/L (ref 20–28)
CREAT SERPL-MCNC: 0.8 MG/DL (ref 0.6–1.1)
DIFFERENTIAL METHOD BLD: NORMAL
ECHO AO SINUS VALSALVA DIAM: 2.9 CM
ECHO AO SINUS VALSALVA INDEX: 1.67 CM/M2
ECHO BSA: 1.77 M2
ECHO LV IVSD: 2 CM (ref 0.6–0.9)
ECHO MV EROA PISA: 0.2 CM2
ECHO MV REGURGITANT ALIASING (NYQUIST) VELOCITY: 43 CM/S
ECHO MV REGURGITANT RADIUS PISA: 0.7 CM
ECHO MV REGURGITANT VELOCITY PISA: 5.3 M/S
ECHO MV REGURGITANT VOLUME PISA: 51.38 ML
ECHO MV REGURGITANT VTIA: 205.8 CM
EOSINOPHIL # BLD: 0.2 K/UL (ref 0–0.8)
EOSINOPHIL NFR BLD: 2 % (ref 0.5–7.8)
ERYTHROCYTE [DISTWIDTH] IN BLOOD BY AUTOMATED COUNT: 12.7 % (ref 11.9–14.6)
GLOBULIN SER CALC-MCNC: 4 G/DL (ref 2.3–3.5)
GLUCOSE SERPL-MCNC: 105 MG/DL (ref 70–99)
HCT VFR BLD AUTO: 44.8 % (ref 35.8–46.3)
HGB BLD-MCNC: 14.8 G/DL (ref 11.7–15.4)
IMM GRANULOCYTES # BLD AUTO: 0 K/UL (ref 0–0.5)
IMM GRANULOCYTES NFR BLD AUTO: 0 % (ref 0–5)
LYMPHOCYTES # BLD: 2.5 K/UL (ref 0.5–4.6)
LYMPHOCYTES NFR BLD: 32 % (ref 13–44)
MAGNESIUM SERPL-MCNC: 2.2 MG/DL (ref 1.8–2.4)
MCH RBC QN AUTO: 29.2 PG (ref 26.1–32.9)
MCHC RBC AUTO-ENTMCNC: 33 G/DL (ref 31.4–35)
MCV RBC AUTO: 88.4 FL (ref 82–102)
MONOCYTES # BLD: 0.8 K/UL (ref 0.1–1.3)
MONOCYTES NFR BLD: 10 % (ref 4–12)
NEUTS SEG # BLD: 4.4 K/UL (ref 1.7–8.2)
NEUTS SEG NFR BLD: 55 % (ref 43–78)
NRBC # BLD: 0 K/UL (ref 0–0.2)
PLATELET # BLD AUTO: 265 K/UL (ref 150–450)
PMV BLD AUTO: 10 FL (ref 9.4–12.3)
POTASSIUM SERPL-SCNC: 4 MMOL/L (ref 3.5–5.1)
PROT SERPL-MCNC: 7.6 G/DL (ref 6.3–8.2)
RBC # BLD AUTO: 5.07 M/UL (ref 4.05–5.2)
SERVICE CMNT-IMP: NORMAL
SERVICE CMNT-IMP: NORMAL
SODIUM SERPL-SCNC: 137 MMOL/L (ref 136–145)
WBC # BLD AUTO: 7.9 K/UL (ref 4.3–11.1)

## 2024-07-05 PROCEDURE — 1100000003 HC PRIVATE W/ TELEMETRY

## 2024-07-05 PROCEDURE — 93312 ECHO TRANSESOPHAGEAL: CPT | Performed by: INTERNAL MEDICINE

## 2024-07-05 PROCEDURE — 6360000002 HC RX W HCPCS: Performed by: INTERNAL MEDICINE

## 2024-07-05 PROCEDURE — 6370000000 HC RX 637 (ALT 250 FOR IP): Performed by: INTERNAL MEDICINE

## 2024-07-05 PROCEDURE — 83735 ASSAY OF MAGNESIUM: CPT

## 2024-07-05 PROCEDURE — 93325 DOPPLER ECHO COLOR FLOW MAPG: CPT

## 2024-07-05 PROCEDURE — 36415 COLL VENOUS BLD VENIPUNCTURE: CPT

## 2024-07-05 PROCEDURE — 6370000000 HC RX 637 (ALT 250 FOR IP): Performed by: NURSE PRACTITIONER

## 2024-07-05 PROCEDURE — 93319 3D ECHO IMG CGEN CAR ANOMAL: CPT | Performed by: INTERNAL MEDICINE

## 2024-07-05 PROCEDURE — 80053 COMPREHEN METABOLIC PANEL: CPT

## 2024-07-05 PROCEDURE — 97530 THERAPEUTIC ACTIVITIES: CPT

## 2024-07-05 PROCEDURE — 2580000003 HC RX 258: Performed by: INTERNAL MEDICINE

## 2024-07-05 PROCEDURE — 99232 SBSQ HOSP IP/OBS MODERATE 35: CPT | Performed by: INTERNAL MEDICINE

## 2024-07-05 PROCEDURE — 85025 COMPLETE CBC W/AUTO DIFF WBC: CPT

## 2024-07-05 PROCEDURE — B24BZZ4 ULTRASONOGRAPHY OF HEART WITH AORTA, TRANSESOPHAGEAL: ICD-10-PCS | Performed by: INTERNAL MEDICINE

## 2024-07-05 PROCEDURE — 93320 DOPPLER ECHO COMPLETE: CPT | Performed by: INTERNAL MEDICINE

## 2024-07-05 PROCEDURE — 99152 MOD SED SAME PHYS/QHP 5/>YRS: CPT

## 2024-07-05 PROCEDURE — 93319 3D ECHO IMG CGEN CAR ANOMAL: CPT

## 2024-07-05 RX ORDER — LIDOCAINE HYDROCHLORIDE 20 MG/ML
SOLUTION OROPHARYNGEAL PRN
Status: COMPLETED | OUTPATIENT
Start: 2024-07-05 | End: 2024-07-05

## 2024-07-05 RX ORDER — FENTANYL CITRATE 50 UG/ML
INJECTION, SOLUTION INTRAMUSCULAR; INTRAVENOUS PRN
Status: COMPLETED | OUTPATIENT
Start: 2024-07-05 | End: 2024-07-05

## 2024-07-05 RX ORDER — MIDAZOLAM HYDROCHLORIDE 1 MG/ML
INJECTION INTRAMUSCULAR; INTRAVENOUS PRN
Status: COMPLETED | OUTPATIENT
Start: 2024-07-05 | End: 2024-07-05

## 2024-07-05 RX ORDER — ACETAMINOPHEN 325 MG/1
650 TABLET ORAL EVERY 6 HOURS PRN
Status: DISCONTINUED | OUTPATIENT
Start: 2024-07-05 | End: 2024-07-11

## 2024-07-05 RX ADMIN — ACETAMINOPHEN 650 MG: 325 TABLET ORAL at 16:54

## 2024-07-05 RX ADMIN — QUETIAPINE FUMARATE 25 MG: 25 TABLET ORAL at 21:20

## 2024-07-05 RX ADMIN — MIDAZOLAM 1 MG: 1 INJECTION INTRAMUSCULAR; INTRAVENOUS at 11:35

## 2024-07-05 RX ADMIN — FAMOTIDINE 20 MG: 20 TABLET, FILM COATED ORAL at 08:37

## 2024-07-05 RX ADMIN — SODIUM CHLORIDE, PRESERVATIVE FREE 10 ML: 5 INJECTION INTRAVENOUS at 21:21

## 2024-07-05 RX ADMIN — SPIRONOLACTONE 25 MG: 25 TABLET ORAL at 08:36

## 2024-07-05 RX ADMIN — DULOXETINE HYDROCHLORIDE 60 MG: 60 CAPSULE, DELAYED RELEASE ORAL at 08:36

## 2024-07-05 RX ADMIN — SODIUM CHLORIDE, PRESERVATIVE FREE 10 ML: 5 INJECTION INTRAVENOUS at 08:37

## 2024-07-05 RX ADMIN — FUROSEMIDE 40 MG: 10 INJECTION, SOLUTION INTRAMUSCULAR; INTRAVENOUS at 18:31

## 2024-07-05 RX ADMIN — HYDROCODONE BITARTRATE AND ACETAMINOPHEN 1 TABLET: 10; 325 TABLET ORAL at 03:03

## 2024-07-05 RX ADMIN — FUROSEMIDE 40 MG: 10 INJECTION, SOLUTION INTRAMUSCULAR; INTRAVENOUS at 08:37

## 2024-07-05 RX ADMIN — LEVOTHYROXINE SODIUM 75 MCG: 0.07 TABLET ORAL at 08:36

## 2024-07-05 RX ADMIN — FENTANYL CITRATE 25 MCG: 50 INJECTION, SOLUTION INTRAMUSCULAR; INTRAVENOUS at 11:23

## 2024-07-05 RX ADMIN — METOPROLOL SUCCINATE 25 MG: 25 TABLET, EXTENDED RELEASE ORAL at 21:20

## 2024-07-05 RX ADMIN — ENOXAPARIN SODIUM 40 MG: 100 INJECTION SUBCUTANEOUS at 13:57

## 2024-07-05 RX ADMIN — FAMOTIDINE 20 MG: 20 TABLET, FILM COATED ORAL at 21:20

## 2024-07-05 RX ADMIN — MIDAZOLAM 2 MG: 1 INJECTION INTRAMUSCULAR; INTRAVENOUS at 11:19

## 2024-07-05 RX ADMIN — ROSUVASTATIN CALCIUM 20 MG: 20 TABLET, FILM COATED ORAL at 21:20

## 2024-07-05 RX ADMIN — HYDROCODONE BITARTRATE AND ACETAMINOPHEN 1 TABLET: 10; 325 TABLET ORAL at 09:31

## 2024-07-05 RX ADMIN — MIDAZOLAM 1 MG: 1 INJECTION INTRAMUSCULAR; INTRAVENOUS at 11:22

## 2024-07-05 RX ADMIN — METOPROLOL SUCCINATE 25 MG: 25 TABLET, EXTENDED RELEASE ORAL at 08:37

## 2024-07-05 RX ADMIN — FENTANYL CITRATE 25 MCG: 50 INJECTION, SOLUTION INTRAMUSCULAR; INTRAVENOUS at 11:19

## 2024-07-05 RX ADMIN — LIDOCAINE HYDROCHLORIDE 15 ML: 20 SOLUTION ORAL at 10:33

## 2024-07-05 NOTE — CARE COORDINATION
CM reviewed clinical notes. Patient transferred from ICU for Tele on 7/2. JAY today to reevaluate MR and AI. CTS following.   PT/OT recommending home health initial visit.   CM following as POC is determined.  LOS 6D

## 2024-07-06 LAB
ANION GAP SERPL CALC-SCNC: 11 MMOL/L (ref 9–18)
BUN SERPL-MCNC: 18 MG/DL (ref 8–23)
CALCIUM SERPL-MCNC: 9.4 MG/DL (ref 8.8–10.2)
CHLORIDE SERPL-SCNC: 101 MMOL/L (ref 98–107)
CO2 SERPL-SCNC: 25 MMOL/L (ref 20–28)
CREAT SERPL-MCNC: 0.75 MG/DL (ref 0.6–1.1)
ERYTHROCYTE [DISTWIDTH] IN BLOOD BY AUTOMATED COUNT: 12.4 % (ref 11.9–14.6)
GLUCOSE SERPL-MCNC: 111 MG/DL (ref 70–99)
HCT VFR BLD AUTO: 42.2 % (ref 35.8–46.3)
HGB BLD-MCNC: 14.2 G/DL (ref 11.7–15.4)
MAGNESIUM SERPL-MCNC: 2 MG/DL (ref 1.8–2.4)
MCH RBC QN AUTO: 30 PG (ref 26.1–32.9)
MCHC RBC AUTO-ENTMCNC: 33.6 G/DL (ref 31.4–35)
MCV RBC AUTO: 89 FL (ref 82–102)
NRBC # BLD: 0 K/UL (ref 0–0.2)
PLATELET # BLD AUTO: 273 K/UL (ref 150–450)
PMV BLD AUTO: 10 FL (ref 9.4–12.3)
POTASSIUM SERPL-SCNC: 3.7 MMOL/L (ref 3.5–5.1)
RBC # BLD AUTO: 4.74 M/UL (ref 4.05–5.2)
SODIUM SERPL-SCNC: 137 MMOL/L (ref 136–145)
WBC # BLD AUTO: 7.8 K/UL (ref 4.3–11.1)

## 2024-07-06 PROCEDURE — 1100000003 HC PRIVATE W/ TELEMETRY

## 2024-07-06 PROCEDURE — 6370000000 HC RX 637 (ALT 250 FOR IP): Performed by: INTERNAL MEDICINE

## 2024-07-06 PROCEDURE — 80048 BASIC METABOLIC PNL TOTAL CA: CPT

## 2024-07-06 PROCEDURE — 2580000003 HC RX 258: Performed by: INTERNAL MEDICINE

## 2024-07-06 PROCEDURE — 6370000000 HC RX 637 (ALT 250 FOR IP): Performed by: NURSE PRACTITIONER

## 2024-07-06 PROCEDURE — 36415 COLL VENOUS BLD VENIPUNCTURE: CPT

## 2024-07-06 PROCEDURE — 99231 SBSQ HOSP IP/OBS SF/LOW 25: CPT | Performed by: INTERNAL MEDICINE

## 2024-07-06 PROCEDURE — 83735 ASSAY OF MAGNESIUM: CPT

## 2024-07-06 PROCEDURE — 85027 COMPLETE CBC AUTOMATED: CPT

## 2024-07-06 PROCEDURE — 6360000002 HC RX W HCPCS: Performed by: INTERNAL MEDICINE

## 2024-07-06 RX ADMIN — DULOXETINE HYDROCHLORIDE 60 MG: 60 CAPSULE, DELAYED RELEASE ORAL at 09:17

## 2024-07-06 RX ADMIN — HYDROCODONE BITARTRATE AND ACETAMINOPHEN 1 TABLET: 10; 325 TABLET ORAL at 13:20

## 2024-07-06 RX ADMIN — ENOXAPARIN SODIUM 40 MG: 100 INJECTION SUBCUTANEOUS at 16:13

## 2024-07-06 RX ADMIN — ROSUVASTATIN CALCIUM 20 MG: 20 TABLET, FILM COATED ORAL at 21:14

## 2024-07-06 RX ADMIN — FUROSEMIDE 40 MG: 10 INJECTION, SOLUTION INTRAMUSCULAR; INTRAVENOUS at 18:44

## 2024-07-06 RX ADMIN — METOPROLOL SUCCINATE 25 MG: 25 TABLET, EXTENDED RELEASE ORAL at 21:14

## 2024-07-06 RX ADMIN — SODIUM CHLORIDE, PRESERVATIVE FREE 10 ML: 5 INJECTION INTRAVENOUS at 21:13

## 2024-07-06 RX ADMIN — LEVOTHYROXINE SODIUM 75 MCG: 0.07 TABLET ORAL at 05:15

## 2024-07-06 RX ADMIN — SODIUM CHLORIDE, PRESERVATIVE FREE 10 ML: 5 INJECTION INTRAVENOUS at 09:18

## 2024-07-06 RX ADMIN — SPIRONOLACTONE 25 MG: 25 TABLET ORAL at 09:17

## 2024-07-06 RX ADMIN — FAMOTIDINE 20 MG: 20 TABLET, FILM COATED ORAL at 21:14

## 2024-07-06 RX ADMIN — FAMOTIDINE 20 MG: 20 TABLET, FILM COATED ORAL at 09:17

## 2024-07-06 RX ADMIN — HYDROCODONE BITARTRATE AND ACETAMINOPHEN 1 TABLET: 10; 325 TABLET ORAL at 00:36

## 2024-07-06 RX ADMIN — METOPROLOL SUCCINATE 25 MG: 25 TABLET, EXTENDED RELEASE ORAL at 09:17

## 2024-07-06 RX ADMIN — FUROSEMIDE 40 MG: 10 INJECTION, SOLUTION INTRAMUSCULAR; INTRAVENOUS at 09:21

## 2024-07-06 RX ADMIN — HYDROCODONE BITARTRATE AND ACETAMINOPHEN 1 TABLET: 10; 325 TABLET ORAL at 22:32

## 2024-07-06 RX ADMIN — HYDROCODONE BITARTRATE AND ACETAMINOPHEN 1 TABLET: 10; 325 TABLET ORAL at 06:58

## 2024-07-07 PROCEDURE — 6370000000 HC RX 637 (ALT 250 FOR IP): Performed by: INTERNAL MEDICINE

## 2024-07-07 PROCEDURE — 6360000002 HC RX W HCPCS: Performed by: INTERNAL MEDICINE

## 2024-07-07 PROCEDURE — 1100000003 HC PRIVATE W/ TELEMETRY

## 2024-07-07 PROCEDURE — 2580000003 HC RX 258: Performed by: INTERNAL MEDICINE

## 2024-07-07 PROCEDURE — 99232 SBSQ HOSP IP/OBS MODERATE 35: CPT | Performed by: INTERNAL MEDICINE

## 2024-07-07 PROCEDURE — 6370000000 HC RX 637 (ALT 250 FOR IP): Performed by: NURSE PRACTITIONER

## 2024-07-07 RX ORDER — FUROSEMIDE 40 MG/1
40 TABLET ORAL 2 TIMES DAILY
Status: DISCONTINUED | OUTPATIENT
Start: 2024-07-07 | End: 2024-07-11

## 2024-07-07 RX ORDER — FUROSEMIDE 40 MG/1
40 TABLET ORAL 2 TIMES DAILY
Status: DISCONTINUED | OUTPATIENT
Start: 2024-07-07 | End: 2024-07-07

## 2024-07-07 RX ADMIN — ENOXAPARIN SODIUM 40 MG: 100 INJECTION SUBCUTANEOUS at 14:10

## 2024-07-07 RX ADMIN — HYDROCODONE BITARTRATE AND ACETAMINOPHEN 1 TABLET: 10; 325 TABLET ORAL at 05:26

## 2024-07-07 RX ADMIN — SPIRONOLACTONE 25 MG: 25 TABLET ORAL at 08:15

## 2024-07-07 RX ADMIN — FUROSEMIDE 40 MG: 10 INJECTION, SOLUTION INTRAMUSCULAR; INTRAVENOUS at 08:15

## 2024-07-07 RX ADMIN — FUROSEMIDE 40 MG: 40 TABLET ORAL at 16:55

## 2024-07-07 RX ADMIN — FAMOTIDINE 20 MG: 20 TABLET, FILM COATED ORAL at 08:15

## 2024-07-07 RX ADMIN — METOPROLOL SUCCINATE 25 MG: 25 TABLET, EXTENDED RELEASE ORAL at 08:15

## 2024-07-07 RX ADMIN — SODIUM CHLORIDE, PRESERVATIVE FREE 10 ML: 5 INJECTION INTRAVENOUS at 21:00

## 2024-07-07 RX ADMIN — QUETIAPINE FUMARATE 25 MG: 25 TABLET ORAL at 22:09

## 2024-07-07 RX ADMIN — DULOXETINE HYDROCHLORIDE 60 MG: 60 CAPSULE, DELAYED RELEASE ORAL at 08:15

## 2024-07-07 RX ADMIN — SODIUM CHLORIDE, PRESERVATIVE FREE 5 ML: 5 INJECTION INTRAVENOUS at 08:16

## 2024-07-07 RX ADMIN — FAMOTIDINE 20 MG: 20 TABLET, FILM COATED ORAL at 20:16

## 2024-07-07 RX ADMIN — METOPROLOL SUCCINATE 25 MG: 25 TABLET, EXTENDED RELEASE ORAL at 20:16

## 2024-07-07 RX ADMIN — ACETAMINOPHEN 650 MG: 325 TABLET ORAL at 04:14

## 2024-07-07 RX ADMIN — LEVOTHYROXINE SODIUM 75 MCG: 0.07 TABLET ORAL at 05:26

## 2024-07-07 RX ADMIN — ROSUVASTATIN CALCIUM 20 MG: 20 TABLET, FILM COATED ORAL at 20:16

## 2024-07-08 ENCOUNTER — PREP FOR PROCEDURE (OUTPATIENT)
Dept: CARDIOTHORACIC SURGERY | Age: 79
End: 2024-07-08

## 2024-07-08 DIAGNOSIS — R06.02 SHORTNESS OF BREATH: ICD-10-CM

## 2024-07-08 LAB
ANION GAP SERPL CALC-SCNC: 15 MMOL/L (ref 9–18)
BUN SERPL-MCNC: 19 MG/DL (ref 8–23)
CALCIUM SERPL-MCNC: 9.8 MG/DL (ref 8.8–10.2)
CHLORIDE SERPL-SCNC: 98 MMOL/L (ref 98–107)
CO2 SERPL-SCNC: 24 MMOL/L (ref 20–28)
CREAT SERPL-MCNC: 0.84 MG/DL (ref 0.6–1.1)
ERYTHROCYTE [DISTWIDTH] IN BLOOD BY AUTOMATED COUNT: 12.3 % (ref 11.9–14.6)
GLUCOSE SERPL-MCNC: 160 MG/DL (ref 70–99)
HCT VFR BLD AUTO: 44.5 % (ref 35.8–46.3)
HGB BLD-MCNC: 14.5 G/DL (ref 11.7–15.4)
MAGNESIUM SERPL-MCNC: 2.1 MG/DL (ref 1.8–2.4)
MCH RBC QN AUTO: 29.1 PG (ref 26.1–32.9)
MCHC RBC AUTO-ENTMCNC: 32.6 G/DL (ref 31.4–35)
MCV RBC AUTO: 89.4 FL (ref 82–102)
NRBC # BLD: 0 K/UL (ref 0–0.2)
PLATELET # BLD AUTO: 320 K/UL (ref 150–450)
PMV BLD AUTO: 10.1 FL (ref 9.4–12.3)
POTASSIUM SERPL-SCNC: 3 MMOL/L (ref 3.5–5.1)
POTASSIUM SERPL-SCNC: 4.8 MMOL/L (ref 3.5–5.1)
RBC # BLD AUTO: 4.98 M/UL (ref 4.05–5.2)
SODIUM SERPL-SCNC: 136 MMOL/L (ref 136–145)
WBC # BLD AUTO: 9.9 K/UL (ref 4.3–11.1)

## 2024-07-08 PROCEDURE — 99232 SBSQ HOSP IP/OBS MODERATE 35: CPT | Performed by: INTERNAL MEDICINE

## 2024-07-08 PROCEDURE — 6370000000 HC RX 637 (ALT 250 FOR IP): Performed by: INTERNAL MEDICINE

## 2024-07-08 PROCEDURE — 97530 THERAPEUTIC ACTIVITIES: CPT

## 2024-07-08 PROCEDURE — 83735 ASSAY OF MAGNESIUM: CPT

## 2024-07-08 PROCEDURE — 85027 COMPLETE CBC AUTOMATED: CPT

## 2024-07-08 PROCEDURE — 36415 COLL VENOUS BLD VENIPUNCTURE: CPT

## 2024-07-08 PROCEDURE — 1100000003 HC PRIVATE W/ TELEMETRY

## 2024-07-08 PROCEDURE — 80048 BASIC METABOLIC PNL TOTAL CA: CPT

## 2024-07-08 PROCEDURE — 2580000003 HC RX 258: Performed by: INTERNAL MEDICINE

## 2024-07-08 PROCEDURE — 84132 ASSAY OF SERUM POTASSIUM: CPT

## 2024-07-08 PROCEDURE — 6360000002 HC RX W HCPCS: Performed by: INTERNAL MEDICINE

## 2024-07-08 PROCEDURE — 6370000000 HC RX 637 (ALT 250 FOR IP): Performed by: NURSE PRACTITIONER

## 2024-07-08 RX ORDER — POTASSIUM CHLORIDE 20 MEQ/1
40 TABLET, EXTENDED RELEASE ORAL ONCE
Status: COMPLETED | OUTPATIENT
Start: 2024-07-08 | End: 2024-07-08

## 2024-07-08 RX ORDER — TRAZODONE HYDROCHLORIDE 50 MG/1
150 TABLET ORAL NIGHTLY
Status: DISCONTINUED | OUTPATIENT
Start: 2024-07-08 | End: 2024-07-15

## 2024-07-08 RX ADMIN — FAMOTIDINE 20 MG: 20 TABLET, FILM COATED ORAL at 20:16

## 2024-07-08 RX ADMIN — METOPROLOL SUCCINATE 25 MG: 25 TABLET, EXTENDED RELEASE ORAL at 20:16

## 2024-07-08 RX ADMIN — LEVOTHYROXINE SODIUM 75 MCG: 0.07 TABLET ORAL at 05:30

## 2024-07-08 RX ADMIN — SPIRONOLACTONE 25 MG: 25 TABLET ORAL at 08:35

## 2024-07-08 RX ADMIN — HYDROCODONE BITARTRATE AND ACETAMINOPHEN 1 TABLET: 10; 325 TABLET ORAL at 23:23

## 2024-07-08 RX ADMIN — POTASSIUM CHLORIDE 40 MEQ: 1500 TABLET, EXTENDED RELEASE ORAL at 08:35

## 2024-07-08 RX ADMIN — METOPROLOL SUCCINATE 25 MG: 25 TABLET, EXTENDED RELEASE ORAL at 08:35

## 2024-07-08 RX ADMIN — TRAZODONE HYDROCHLORIDE 150 MG: 50 TABLET ORAL at 21:39

## 2024-07-08 RX ADMIN — FAMOTIDINE 20 MG: 20 TABLET, FILM COATED ORAL at 08:35

## 2024-07-08 RX ADMIN — HYDROCODONE BITARTRATE AND ACETAMINOPHEN 1 TABLET: 10; 325 TABLET ORAL at 04:05

## 2024-07-08 RX ADMIN — ROSUVASTATIN CALCIUM 20 MG: 20 TABLET, FILM COATED ORAL at 20:16

## 2024-07-08 RX ADMIN — FUROSEMIDE 40 MG: 40 TABLET ORAL at 17:25

## 2024-07-08 RX ADMIN — DULOXETINE HYDROCHLORIDE 60 MG: 60 CAPSULE, DELAYED RELEASE ORAL at 08:35

## 2024-07-08 RX ADMIN — SODIUM CHLORIDE, PRESERVATIVE FREE 10 ML: 5 INJECTION INTRAVENOUS at 21:00

## 2024-07-08 RX ADMIN — POTASSIUM CHLORIDE 40 MEQ: 1500 TABLET, EXTENDED RELEASE ORAL at 13:08

## 2024-07-08 RX ADMIN — FUROSEMIDE 40 MG: 40 TABLET ORAL at 08:35

## 2024-07-08 RX ADMIN — ENOXAPARIN SODIUM 40 MG: 100 INJECTION SUBCUTANEOUS at 13:08

## 2024-07-08 RX ADMIN — SODIUM CHLORIDE, PRESERVATIVE FREE 10 ML: 5 INJECTION INTRAVENOUS at 08:36

## 2024-07-08 RX ADMIN — ACETAMINOPHEN 650 MG: 325 TABLET ORAL at 08:37

## 2024-07-08 NOTE — CARE COORDINATION
CM reviewed clinicals. Patient awaiting CTS to schedule surgery. CTS requested JAY which was completed on 7/5.   Pre op therapy assessment recommending home health.  No new CM needs.  LOS 9D.

## 2024-07-09 ENCOUNTER — APPOINTMENT (OUTPATIENT)
Dept: GENERAL RADIOLOGY | Age: 79
DRG: 212 | End: 2024-07-09
Payer: MEDICARE

## 2024-07-09 PROBLEM — I34.0 MITRAL REGURGITATION: Status: ACTIVE | Noted: 2024-07-08

## 2024-07-09 PROBLEM — R06.02 SHORTNESS OF BREATH: Status: ACTIVE | Noted: 2024-07-08

## 2024-07-09 PROBLEM — I35.0 AORTIC VALVE STENOSIS: Status: ACTIVE | Noted: 2024-07-08

## 2024-07-09 LAB
ANION GAP SERPL CALC-SCNC: 13 MMOL/L (ref 9–18)
BUN SERPL-MCNC: 22 MG/DL (ref 8–23)
CALCIUM SERPL-MCNC: 9.8 MG/DL (ref 8.8–10.2)
CHLORIDE SERPL-SCNC: 98 MMOL/L (ref 98–107)
CO2 SERPL-SCNC: 25 MMOL/L (ref 20–28)
CREAT SERPL-MCNC: 1.06 MG/DL (ref 0.6–1.1)
ERYTHROCYTE [DISTWIDTH] IN BLOOD BY AUTOMATED COUNT: 12.4 % (ref 11.9–14.6)
GLUCOSE SERPL-MCNC: 146 MG/DL (ref 70–99)
HCT VFR BLD AUTO: 45.4 % (ref 35.8–46.3)
HGB BLD-MCNC: 15 G/DL (ref 11.7–15.4)
MCH RBC QN AUTO: 29.8 PG (ref 26.1–32.9)
MCHC RBC AUTO-ENTMCNC: 33 G/DL (ref 31.4–35)
MCV RBC AUTO: 90.3 FL (ref 82–102)
NRBC # BLD: 0 K/UL (ref 0–0.2)
PLATELET # BLD AUTO: 321 K/UL (ref 150–450)
PMV BLD AUTO: 10.3 FL (ref 9.4–12.3)
POTASSIUM SERPL-SCNC: 4 MMOL/L (ref 3.5–5.1)
RBC # BLD AUTO: 5.03 M/UL (ref 4.05–5.2)
SODIUM SERPL-SCNC: 136 MMOL/L (ref 136–145)
WBC # BLD AUTO: 15.4 K/UL (ref 4.3–11.1)

## 2024-07-09 PROCEDURE — 6370000000 HC RX 637 (ALT 250 FOR IP): Performed by: INTERNAL MEDICINE

## 2024-07-09 PROCEDURE — 36415 COLL VENOUS BLD VENIPUNCTURE: CPT

## 2024-07-09 PROCEDURE — 1100000003 HC PRIVATE W/ TELEMETRY

## 2024-07-09 PROCEDURE — 6370000000 HC RX 637 (ALT 250 FOR IP): Performed by: NURSE PRACTITIONER

## 2024-07-09 PROCEDURE — 6360000002 HC RX W HCPCS: Performed by: INTERNAL MEDICINE

## 2024-07-09 PROCEDURE — 85027 COMPLETE CBC AUTOMATED: CPT

## 2024-07-09 PROCEDURE — 99232 SBSQ HOSP IP/OBS MODERATE 35: CPT | Performed by: INTERNAL MEDICINE

## 2024-07-09 PROCEDURE — 80048 BASIC METABOLIC PNL TOTAL CA: CPT

## 2024-07-09 PROCEDURE — 71045 X-RAY EXAM CHEST 1 VIEW: CPT

## 2024-07-09 PROCEDURE — 2580000003 HC RX 258: Performed by: INTERNAL MEDICINE

## 2024-07-09 RX ORDER — AMIODARONE HYDROCHLORIDE 200 MG/1
600 TABLET ORAL EVERY 12 HOURS
Status: COMPLETED | OUTPATIENT
Start: 2024-07-10 | End: 2024-07-11

## 2024-07-09 RX ADMIN — ROSUVASTATIN CALCIUM 20 MG: 20 TABLET, FILM COATED ORAL at 20:08

## 2024-07-09 RX ADMIN — ENOXAPARIN SODIUM 40 MG: 100 INJECTION SUBCUTANEOUS at 13:41

## 2024-07-09 RX ADMIN — DULOXETINE HYDROCHLORIDE 60 MG: 60 CAPSULE, DELAYED RELEASE ORAL at 08:22

## 2024-07-09 RX ADMIN — SODIUM CHLORIDE, PRESERVATIVE FREE 10 ML: 5 INJECTION INTRAVENOUS at 08:23

## 2024-07-09 RX ADMIN — SODIUM CHLORIDE, PRESERVATIVE FREE 10 ML: 5 INJECTION INTRAVENOUS at 21:00

## 2024-07-09 RX ADMIN — METOPROLOL SUCCINATE 25 MG: 25 TABLET, EXTENDED RELEASE ORAL at 08:22

## 2024-07-09 RX ADMIN — LEVOTHYROXINE SODIUM 75 MCG: 0.07 TABLET ORAL at 05:51

## 2024-07-09 RX ADMIN — FAMOTIDINE 20 MG: 20 TABLET, FILM COATED ORAL at 20:08

## 2024-07-09 RX ADMIN — METOPROLOL SUCCINATE 25 MG: 25 TABLET, EXTENDED RELEASE ORAL at 20:08

## 2024-07-09 RX ADMIN — FUROSEMIDE 40 MG: 40 TABLET ORAL at 17:52

## 2024-07-09 RX ADMIN — TRAZODONE HYDROCHLORIDE 150 MG: 50 TABLET ORAL at 22:07

## 2024-07-09 RX ADMIN — FUROSEMIDE 40 MG: 40 TABLET ORAL at 08:22

## 2024-07-09 RX ADMIN — SPIRONOLACTONE 25 MG: 25 TABLET ORAL at 08:22

## 2024-07-09 RX ADMIN — HYDROCODONE BITARTRATE AND ACETAMINOPHEN 1 TABLET: 10; 325 TABLET ORAL at 13:07

## 2024-07-09 RX ADMIN — HYDROCODONE BITARTRATE AND ACETAMINOPHEN 1 TABLET: 10; 325 TABLET ORAL at 22:09

## 2024-07-09 RX ADMIN — FAMOTIDINE 20 MG: 20 TABLET, FILM COATED ORAL at 08:22

## 2024-07-10 ENCOUNTER — ANESTHESIA EVENT (OUTPATIENT)
Dept: SURGERY | Age: 79
End: 2024-07-10
Payer: MEDICARE

## 2024-07-10 LAB
ANION GAP SERPL CALC-SCNC: 12 MMOL/L (ref 9–18)
BUN SERPL-MCNC: 20 MG/DL (ref 8–23)
CALCIUM SERPL-MCNC: 9.8 MG/DL (ref 8.8–10.2)
CHLORIDE SERPL-SCNC: 98 MMOL/L (ref 98–107)
CO2 SERPL-SCNC: 27 MMOL/L (ref 20–28)
CREAT SERPL-MCNC: 0.88 MG/DL (ref 0.6–1.1)
ERYTHROCYTE [DISTWIDTH] IN BLOOD BY AUTOMATED COUNT: 12.5 % (ref 11.9–14.6)
EST. AVERAGE GLUCOSE BLD GHB EST-MCNC: 119 MG/DL
GLUCOSE SERPL-MCNC: 118 MG/DL (ref 70–99)
HBA1C MFR BLD: 5.8 % (ref 0–5.6)
HCT VFR BLD AUTO: 44.2 % (ref 35.8–46.3)
HGB BLD-MCNC: 14.8 G/DL (ref 11.7–15.4)
HISTORY CHECK: NORMAL
INR PPP: 1
MCH RBC QN AUTO: 29.8 PG (ref 26.1–32.9)
MCHC RBC AUTO-ENTMCNC: 33.5 G/DL (ref 31.4–35)
MCV RBC AUTO: 88.9 FL (ref 82–102)
NRBC # BLD: 0 K/UL (ref 0–0.2)
PLATELET # BLD AUTO: 297 K/UL (ref 150–450)
PMV BLD AUTO: 10.1 FL (ref 9.4–12.3)
POTASSIUM SERPL-SCNC: 4 MMOL/L (ref 3.5–5.1)
PROTHROMBIN TIME: 14.1 SEC (ref 11.3–14.9)
RBC # BLD AUTO: 4.97 M/UL (ref 4.05–5.2)
SODIUM SERPL-SCNC: 137 MMOL/L (ref 136–145)
WBC # BLD AUTO: 8.5 K/UL (ref 4.3–11.1)

## 2024-07-10 PROCEDURE — 2580000003 HC RX 258: Performed by: INTERNAL MEDICINE

## 2024-07-10 PROCEDURE — 86900 BLOOD TYPING SEROLOGIC ABO: CPT

## 2024-07-10 PROCEDURE — 85027 COMPLETE CBC AUTOMATED: CPT

## 2024-07-10 PROCEDURE — 97530 THERAPEUTIC ACTIVITIES: CPT

## 2024-07-10 PROCEDURE — 85610 PROTHROMBIN TIME: CPT

## 2024-07-10 PROCEDURE — 6370000000 HC RX 637 (ALT 250 FOR IP): Performed by: PHYSICIAN ASSISTANT

## 2024-07-10 PROCEDURE — 83036 HEMOGLOBIN GLYCOSYLATED A1C: CPT

## 2024-07-10 PROCEDURE — 99232 SBSQ HOSP IP/OBS MODERATE 35: CPT | Performed by: INTERNAL MEDICINE

## 2024-07-10 PROCEDURE — 6370000000 HC RX 637 (ALT 250 FOR IP): Performed by: INTERNAL MEDICINE

## 2024-07-10 PROCEDURE — 6370000000 HC RX 637 (ALT 250 FOR IP): Performed by: NURSE PRACTITIONER

## 2024-07-10 PROCEDURE — 80048 BASIC METABOLIC PNL TOTAL CA: CPT

## 2024-07-10 PROCEDURE — 1100000003 HC PRIVATE W/ TELEMETRY

## 2024-07-10 PROCEDURE — 86850 RBC ANTIBODY SCREEN: CPT

## 2024-07-10 PROCEDURE — 86923 COMPATIBILITY TEST ELECTRIC: CPT

## 2024-07-10 PROCEDURE — 36415 COLL VENOUS BLD VENIPUNCTURE: CPT

## 2024-07-10 PROCEDURE — 86901 BLOOD TYPING SEROLOGIC RH(D): CPT

## 2024-07-10 RX ADMIN — SODIUM CHLORIDE, PRESERVATIVE FREE 10 ML: 5 INJECTION INTRAVENOUS at 21:41

## 2024-07-10 RX ADMIN — DULOXETINE HYDROCHLORIDE 60 MG: 60 CAPSULE, DELAYED RELEASE ORAL at 08:41

## 2024-07-10 RX ADMIN — FUROSEMIDE 40 MG: 40 TABLET ORAL at 08:41

## 2024-07-10 RX ADMIN — TRAZODONE HYDROCHLORIDE 150 MG: 50 TABLET ORAL at 21:40

## 2024-07-10 RX ADMIN — FAMOTIDINE 20 MG: 20 TABLET, FILM COATED ORAL at 08:41

## 2024-07-10 RX ADMIN — ROSUVASTATIN CALCIUM 20 MG: 20 TABLET, FILM COATED ORAL at 21:41

## 2024-07-10 RX ADMIN — FAMOTIDINE 20 MG: 20 TABLET, FILM COATED ORAL at 21:40

## 2024-07-10 RX ADMIN — SODIUM CHLORIDE, PRESERVATIVE FREE 5 ML: 5 INJECTION INTRAVENOUS at 08:41

## 2024-07-10 RX ADMIN — METOPROLOL SUCCINATE 25 MG: 25 TABLET, EXTENDED RELEASE ORAL at 21:40

## 2024-07-10 RX ADMIN — ACETAMINOPHEN 650 MG: 325 TABLET ORAL at 13:29

## 2024-07-10 RX ADMIN — FUROSEMIDE 40 MG: 40 TABLET ORAL at 16:25

## 2024-07-10 RX ADMIN — METOPROLOL SUCCINATE 25 MG: 25 TABLET, EXTENDED RELEASE ORAL at 08:41

## 2024-07-10 RX ADMIN — ACETAMINOPHEN 650 MG: 325 TABLET ORAL at 21:41

## 2024-07-10 RX ADMIN — AMIODARONE HYDROCHLORIDE 600 MG: 200 TABLET ORAL at 16:25

## 2024-07-10 RX ADMIN — LEVOTHYROXINE SODIUM 75 MCG: 0.07 TABLET ORAL at 06:20

## 2024-07-10 ASSESSMENT — ENCOUNTER SYMPTOMS: SHORTNESS OF BREATH: 1

## 2024-07-10 NOTE — PERIOP NOTE
Spoke with primary RN, Alyse, to inform of scheduled procedure for tomorrow with planned arrival to pre-op at 0515. Per RN, pt alert and able to consent.

## 2024-07-10 NOTE — CARE COORDINATION
CM reviewed clinical notes. Plan for MVR/AVR on 7/11.   CM received a call from Anu at [employer] ([phone]) for update on patient's condition.  CM informed of surgery date and therapy recommendation pre operatively for home health.  LOS 11D

## 2024-07-10 NOTE — ANESTHESIA PRE PROCEDURE
Nonrheumatic aortic valve stenosis I35.0   • SUMNER (dyspnea on exertion) R06.09   • Coronary atherosclerosis of native coronary vessel I25.10   • Abnormal EKG R94.31   • Chest pain R07.9   • Acute on chronic respiratory failure with hypoxia (HCC) J96.21   • Volume overload E87.70   • Heart murmur R01.1   • Acute pulmonary edema (HCC) J81.0   • Pleural effusion J90   • Acute respiratory failure with hypoxia (HCC) J96.01   • Altered mental status R41.82   • Frailty R54   • Encounter for palliative care Z51.5   • Aortic valve stenosis I35.0   • Mitral regurgitation I34.0   • Shortness of breath R06.02       Past Medical History:        Diagnosis Date   • Abnormal EKG    • Aortic valve insufficiency    • Arrhythmia    • Arthritis     legs , hands , feet   • CAD (coronary artery disease)    • Chest pain    • Coronary atherosclerosis of native coronary vessel    • Dyslipidemia    • S/P PTCA (percutaneous transluminal coronary angioplasty)        Past Surgical History:        Procedure Laterality Date   • CARDIAC PROCEDURE N/A 2024    Left and right heart cath / coronary angiography performed by Fernandez Escobedo MD at [facility] CARDIAC CATH LAB   • GYN      hyst,cystocele , rectocele   • HEENT      sinus x2   • OTHER SURGICAL HISTORY      carpal tunnel  rt hand   • FL UNLISTED PROCEDURE CARDIAC SURGERY      cath stent 08       Social History:    Social History     Tobacco Use   • Smoking status: Former     Current packs/day: 0.00     Types: Cigarettes     Quit date: 1991     Years since quittin.9   • Smokeless tobacco: Never   Substance Use Topics   • Alcohol use: No                                Counseling given: Not Answered      Vital Signs (Current):   Vitals:    07/10/24 0830 07/10/24 1149 07/10/24 1609 07/10/24 1625   BP: (!) 118/90 (!) 105/57 (!) 106/54 (!) 110/57   Pulse:  70 74    Resp:  20 20    Temp:  97.9 °F (36.6 °C) 97.2 °F (36.2 °C)    TempSrc:  Oral Oral    SpO2:  98% 97%    Weight:       Height:

## 2024-07-11 ENCOUNTER — APPOINTMENT (OUTPATIENT)
Dept: GENERAL RADIOLOGY | Age: 79
DRG: 212 | End: 2024-07-11
Payer: MEDICARE

## 2024-07-11 ENCOUNTER — ANESTHESIA (OUTPATIENT)
Dept: SURGERY | Age: 79
End: 2024-07-11
Payer: MEDICARE

## 2024-07-11 PROBLEM — I35.0 AORTIC VALVE STENOSIS, NONRHEUMATIC: Status: ACTIVE | Noted: 2024-07-11

## 2024-07-11 PROBLEM — J98.11 ATELECTASIS: Status: ACTIVE | Noted: 2024-07-11

## 2024-07-11 PROBLEM — R09.02 HYPOXIA: Status: ACTIVE | Noted: 2024-07-11

## 2024-07-11 LAB
ACT AVERAGE - AAVG: 132 SEC
ACT AVERAGE - AAVG: 563 SEC
ACT AVERAGE - AAVG: 648 SEC
ACT AVERAGE - AAVG: 708 SEC
ACT AVERAGE - AAVG: 713 SEC
ACT AVERAGE - AAVG: 768 SEC
ANION GAP BLD CALC-SCNC: ABNORMAL MMOL/L
ANION GAP SERPL CALC-SCNC: 14 MMOL/L (ref 9–18)
ANION GAP SERPL CALC-SCNC: 16 MMOL/L (ref 9–18)
APTT PPP: 47.1 SEC (ref 23.3–37.4)
ARTERIAL PATENCY WRIST A: NEGATIVE
BASE DEFICIT BLD-SCNC: 0.7 MMOL/L
BASE DEFICIT BLD-SCNC: 1.9 MMOL/L
BASE DEFICIT BLD-SCNC: 3.1 MMOL/L
BASE DEFICIT BLD-SCNC: 3.1 MMOL/L
BASE DEFICIT BLD-SCNC: 3.7 MMOL/L
BASE DEFICIT BLD-SCNC: 4.6 MMOL/L
BASE DEFICIT BLD-SCNC: 5 MMOL/L
BASE EXCESS BLD CALC-SCNC: 1.3 MMOL/L
BASELINE ACT: 131 SEC
BDY SITE: ABNORMAL
BUN SERPL-MCNC: 15 MG/DL (ref 8–23)
BUN SERPL-MCNC: 21 MG/DL (ref 8–23)
CA-I BLD-MCNC: 0.81 MMOL/L (ref 1.12–1.32)
CA-I BLD-MCNC: 0.91 MMOL/L (ref 1.12–1.32)
CA-I BLD-MCNC: 0.93 MMOL/L (ref 1.12–1.32)
CA-I BLD-MCNC: 0.98 MMOL/L (ref 1.12–1.32)
CA-I BLD-MCNC: 1.11 MMOL/L (ref 1.12–1.32)
CA-I BLD-MCNC: 1.16 MMOL/L (ref 1.12–1.32)
CA-I BLD-MCNC: 1.18 MMOL/L (ref 1.12–1.32)
CALCIUM SERPL-MCNC: 9.3 MG/DL (ref 8.8–10.2)
CALCIUM SERPL-MCNC: 9.4 MG/DL (ref 8.8–10.2)
CHLORIDE SERPL-SCNC: 110 MMOL/L (ref 98–107)
CHLORIDE SERPL-SCNC: 99 MMOL/L (ref 98–107)
CIT FUNC FIB MAX AMP: 12.8 MM (ref 15–32)
CIT FUNC FIB MAX AMP: 19.9 MM (ref 15–32)
CIT KAOLIN/HEP R-TIME: 6.9 MINS (ref 4.3–8.3)
CIT KAOLIN/HEP R-TIME: 7.8 MINS (ref 4.3–8.3)
CIT RAPIDTEG MAX AMP: 40.4 MM (ref 52–70)
CIT RAPIDTEG MAX AMP: 58.5 MM (ref 52–70)
CITRATED KAOLIN ANGLE: 63.6 DEG (ref 63–78)
CITRATED KAOLIN ANGLE: 71.9 DEG (ref 63–78)
CITRATED KAOLIN K TIME: 1.5 MINS (ref 0.8–2.1)
CITRATED KAOLIN K TIME: 2.7 MINS (ref 0.8–2.1)
CITRATED KAOLIN MAX AMPLITUDE: 42.8 MM (ref 52–69)
CITRATED KAOLIN MAX AMPLITUDE: 58.5 MM (ref 52–69)
CITRATED KAOLIN R TIME: 7.1 MINS (ref 4.6–9.1)
CITRATED KAOLIN R TIME: 7.7 MINS (ref 4.6–9.1)
CO2 BLD-SCNC: 20 MMOL/L (ref 13–23)
CO2 BLD-SCNC: 21 MMOL/L (ref 13–23)
CO2 BLD-SCNC: 23 MMOL/L (ref 13–23)
CO2 BLD-SCNC: 25 MMOL/L (ref 13–23)
CO2 BLD-SCNC: 26 MMOL/L (ref 13–23)
CO2 SERPL-SCNC: 19 MMOL/L (ref 20–28)
CO2 SERPL-SCNC: 23 MMOL/L (ref 20–28)
CREAT SERPL-MCNC: 0.95 MG/DL (ref 0.6–1.1)
CREAT SERPL-MCNC: 0.98 MG/DL (ref 0.6–1.1)
EKG ATRIAL RATE: 64 BPM
EKG DIAGNOSIS: NORMAL
EKG P AXIS: 46 DEGREES
EKG P-R INTERVAL: 220 MS
EKG Q-T INTERVAL: 516 MS
EKG QRS DURATION: 118 MS
EKG QTC CALCULATION (BAZETT): 532 MS
EKG R AXIS: 77 DEGREES
EKG T AXIS: 109 DEGREES
EKG VENTRICULAR RATE: 64 BPM
ERYTHROCYTE [DISTWIDTH] IN BLOOD BY AUTOMATED COUNT: 12.3 % (ref 11.9–14.6)
ERYTHROCYTE [DISTWIDTH] IN BLOOD BY AUTOMATED COUNT: 13.1 % (ref 11.9–14.6)
ERYTHROCYTE [DISTWIDTH] IN BLOOD BY AUTOMATED COUNT: 13.2 % (ref 11.9–14.6)
ERYTHROCYTE [DISTWIDTH] IN BLOOD BY AUTOMATED COUNT: 13.2 % (ref 11.9–14.6)
FIBRINOGEN PPP-MCNC: 204 MG/DL (ref 190–501)
FUNCT FIBRINOGEN LEVEL: 363.1 MG/DL (ref 278–581)
FUNCT FIBRINOGEN LEVEL: <278 MG/DL (ref 278–581)
GAS FLOW.O2 O2 DELIVERY SYS: ABNORMAL
GLUCOSE BLD STRIP.AUTO-MCNC: 103 MG/DL (ref 65–100)
GLUCOSE BLD STRIP.AUTO-MCNC: 107 MG/DL (ref 65–100)
GLUCOSE BLD STRIP.AUTO-MCNC: 116 MG/DL (ref 65–100)
GLUCOSE BLD STRIP.AUTO-MCNC: 118 MG/DL (ref 65–100)
GLUCOSE BLD STRIP.AUTO-MCNC: 120 MG/DL (ref 65–100)
GLUCOSE BLD STRIP.AUTO-MCNC: 120 MG/DL (ref 65–100)
GLUCOSE BLD STRIP.AUTO-MCNC: 122 MG/DL (ref 65–100)
GLUCOSE BLD STRIP.AUTO-MCNC: 124 MG/DL (ref 65–100)
GLUCOSE BLD STRIP.AUTO-MCNC: 133 MG/DL (ref 65–100)
GLUCOSE BLD STRIP.AUTO-MCNC: 140 MG/DL (ref 65–100)
GLUCOSE BLD STRIP.AUTO-MCNC: 144 MG/DL (ref 65–100)
GLUCOSE BLD STRIP.AUTO-MCNC: 145 MG/DL (ref 65–100)
GLUCOSE BLD STRIP.AUTO-MCNC: 146 MG/DL (ref 65–100)
GLUCOSE BLD STRIP.AUTO-MCNC: 153 MG/DL (ref 65–100)
GLUCOSE BLD STRIP.AUTO-MCNC: 160 MG/DL (ref 65–100)
GLUCOSE BLD STRIP.AUTO-MCNC: 175 MG/DL (ref 65–100)
GLUCOSE BLD STRIP.AUTO-MCNC: 177 MG/DL (ref 65–100)
GLUCOSE SERPL-MCNC: 118 MG/DL (ref 70–99)
GLUCOSE SERPL-MCNC: 128 MG/DL (ref 70–99)
HCO3 BLD-SCNC: 20.4 MMOL/L (ref 22–26)
HCO3 BLD-SCNC: 20.9 MMOL/L (ref 22–26)
HCO3 BLD-SCNC: 21 MMOL/L (ref 22–26)
HCO3 BLD-SCNC: 22.5 MMOL/L (ref 22–26)
HCO3 BLD-SCNC: 22.9 MMOL/L (ref 22–26)
HCO3 BLD-SCNC: 23 MMOL/L (ref 22–26)
HCO3 BLD-SCNC: 24.6 MMOL/L (ref 22–26)
HCO3 BLD-SCNC: 25.6 MMOL/L (ref 22–26)
HCT VFR BLD AUTO: 29.5 % (ref 35.8–46.3)
HCT VFR BLD AUTO: 30 % (ref 35.8–46.3)
HCT VFR BLD AUTO: 39.5 % (ref 35.8–46.3)
HCT VFR BLD AUTO: 43 % (ref 35.8–46.3)
HEPARIN BOLUS-PATIENT: NORMAL UNITS
HEPARIN BOLUS-PUMP: 0 UNITS
HEPARIN BOLUS-TOTAL: NORMAL UNITS
HEPARIN REQUIRED-PATIENT: 2507
HEPARIN REQUIRED-TOTAL: 3143 UNITS
HEPARIN TEST CONCENTRATE: 3 MG/KG
HGB BLD-MCNC: 13 G/DL (ref 11.7–15.4)
HGB BLD-MCNC: 14.6 G/DL (ref 11.7–15.4)
HGB BLD-MCNC: 9.7 G/DL (ref 11.7–15.4)
HGB BLD-MCNC: 9.8 G/DL (ref 11.7–15.4)
INR PPP: 1.5
INSPIRATION.DURATION SETTING TIME VENT: 0.9 SEC
IPAP/PIP/HIGH PEEP: 20
MAGNESIUM SERPL-MCNC: 2.2 MG/DL (ref 1.8–2.4)
MAGNESIUM SERPL-MCNC: 2.6 MG/DL (ref 1.8–2.4)
MCH RBC QN AUTO: 29.1 PG (ref 26.1–32.9)
MCH RBC QN AUTO: 29.3 PG (ref 26.1–32.9)
MCH RBC QN AUTO: 29.6 PG (ref 26.1–32.9)
MCH RBC QN AUTO: 30.4 PG (ref 26.1–32.9)
MCHC RBC AUTO-ENTMCNC: 32.7 G/DL (ref 31.4–35)
MCHC RBC AUTO-ENTMCNC: 32.9 G/DL (ref 31.4–35)
MCHC RBC AUTO-ENTMCNC: 32.9 G/DL (ref 31.4–35)
MCHC RBC AUTO-ENTMCNC: 34 G/DL (ref 31.4–35)
MCV RBC AUTO: 88.6 FL (ref 82–102)
MCV RBC AUTO: 89.4 FL (ref 82–102)
MCV RBC AUTO: 89.6 FL (ref 82–102)
MCV RBC AUTO: 89.9 FL (ref 82–102)
NRBC # BLD: 0 K/UL (ref 0–0.2)
O2/TOTAL GAS SETTING VFR VENT: 60 %
PAW @ MEAN EXP FLOW ON VENT: 12 CMH2O
PCO2 BLD: 33.1 MMHG (ref 35–45)
PCO2 BLD: 37.9 MMHG (ref 35–45)
PCO2 BLD: 38.7 MMHG (ref 35–45)
PCO2 BLD: 38.8 MMHG (ref 35–45)
PCO2 BLD: 39.8 MMHG (ref 35–45)
PCO2 BLD: 41.8 MMHG (ref 35–45)
PCO2 BLD: 45.6 MMHG (ref 35–45)
PCO2 BLD: 46.1 MMHG (ref 35–45)
PEEP RESPIRATORY: 8 CMH2O
PH BLD: 7.3 (ref 7.35–7.45)
PH BLD: 7.31 (ref 7.35–7.45)
PH BLD: 7.33 (ref 7.35–7.45)
PH BLD: 7.33 (ref 7.35–7.45)
PH BLD: 7.38 (ref 7.35–7.45)
PH BLD: 7.39 (ref 7.35–7.45)
PH BLD: 7.41 (ref 7.35–7.45)
PH BLD: 7.43 (ref 7.35–7.45)
PLATELET # BLD AUTO: 229 K/UL (ref 150–450)
PLATELET # BLD AUTO: 59 K/UL (ref 150–450)
PLATELET # BLD AUTO: 83 K/UL (ref 150–450)
PLATELET # BLD AUTO: 84 K/UL (ref 150–450)
PMV BLD AUTO: 10.2 FL (ref 9.4–12.3)
PMV BLD AUTO: 10.4 FL (ref 9.4–12.3)
PMV BLD AUTO: 10.4 FL (ref 9.4–12.3)
PMV BLD AUTO: 10.9 FL (ref 9.4–12.3)
PO2 BLD: 272 MMHG (ref 75–100)
PO2 BLD: 273 MMHG (ref 75–100)
PO2 BLD: 289 MMHG (ref 75–100)
PO2 BLD: 296 MMHG (ref 75–100)
PO2 BLD: 375 MMHG (ref 75–100)
PO2 BLD: 433 MMHG (ref 75–100)
PO2 BLD: 456 MMHG (ref 75–100)
PO2 BLD: 495 MMHG (ref 75–100)
POTASSIUM BLD-SCNC: 3.6 MMOL/L (ref 3.5–5.1)
POTASSIUM BLD-SCNC: 3.7 MMOL/L (ref 3.5–5.1)
POTASSIUM BLD-SCNC: 3.9 MMOL/L (ref 3.5–5.1)
POTASSIUM BLD-SCNC: 4.3 MMOL/L (ref 3.5–5.1)
POTASSIUM BLD-SCNC: 4.3 MMOL/L (ref 3.5–5.1)
POTASSIUM BLD-SCNC: 4.5 MMOL/L (ref 3.5–5.1)
POTASSIUM BLD-SCNC: 4.9 MMOL/L (ref 3.5–5.1)
POTASSIUM SERPL-SCNC: 3.3 MMOL/L (ref 3.5–5.1)
POTASSIUM SERPL-SCNC: 3.5 MMOL/L (ref 3.5–5.1)
POTASSIUM SERPL-SCNC: 3.6 MMOL/L (ref 3.5–5.1)
POTASSIUM SERPL-SCNC: 3.9 MMOL/L (ref 3.5–5.1)
PRESSURE SUPPORT SETTING VENT: 8 CMH2O
PROJECTED HEPARIN CONCENTATION: 3.5 MG/KG
PROTAMINE DOSE-PUMP: 45 MG
PROTAMINE DOSE-TOTAL: 222 MG
PROTHROMBIN TIME: 18.5 SEC (ref 11.3–14.9)
RBC # BLD AUTO: 3.28 M/UL (ref 4.05–5.2)
RBC # BLD AUTO: 3.35 M/UL (ref 4.05–5.2)
RBC # BLD AUTO: 4.46 M/UL (ref 4.05–5.2)
RBC # BLD AUTO: 4.81 M/UL (ref 4.05–5.2)
RESPIRATORY RATE, POC: 20 (ref 5–40)
SAO2 % BLD: 100 %
SAO2 % BLD: 99.9 % (ref 95–98)
SERVICE CMNT-IMP: ABNORMAL
SLOPE: 74
SODIUM BLD-SCNC: 133 MMOL/L (ref 136–145)
SODIUM BLD-SCNC: 134 MMOL/L (ref 136–145)
SODIUM BLD-SCNC: 134 MMOL/L (ref 136–145)
SODIUM BLD-SCNC: 136 MMOL/L (ref 136–145)
SODIUM BLD-SCNC: 137 MMOL/L (ref 136–145)
SODIUM BLD-SCNC: 138 MMOL/L (ref 136–145)
SODIUM BLD-SCNC: 141 MMOL/L (ref 136–145)
SODIUM SERPL-SCNC: 136 MMOL/L (ref 136–145)
SODIUM SERPL-SCNC: 145 MMOL/L (ref 136–145)
SPECIMEN SITE: ABNORMAL
SPECIMEN TYPE: ABNORMAL
VENTILATION MODE VENT: ABNORMAL
VT SETTING VENT: 450 ML
WBC # BLD AUTO: 12.5 K/UL (ref 4.3–11.1)
WBC # BLD AUTO: 14.8 K/UL (ref 4.3–11.1)
WBC # BLD AUTO: 22.7 K/UL (ref 4.3–11.1)
WBC # BLD AUTO: 9.9 K/UL (ref 4.3–11.1)

## 2024-07-11 PROCEDURE — C1713 ANCHOR/SCREW BN/BN,TIS/BN: HCPCS | Performed by: THORACIC SURGERY (CARDIOTHORACIC VASCULAR SURGERY)

## 2024-07-11 PROCEDURE — 6370000000 HC RX 637 (ALT 250 FOR IP): Performed by: PHYSICIAN ASSISTANT

## 2024-07-11 PROCEDURE — 2580000003 HC RX 258: Performed by: NURSE ANESTHETIST, CERTIFIED REGISTERED

## 2024-07-11 PROCEDURE — 6370000000 HC RX 637 (ALT 250 FOR IP): Performed by: INTERNAL MEDICINE

## 2024-07-11 PROCEDURE — 80048 BASIC METABOLIC PNL TOTAL CA: CPT

## 2024-07-11 PROCEDURE — P9045 ALBUMIN (HUMAN), 5%, 250 ML: HCPCS

## 2024-07-11 PROCEDURE — 88311 DECALCIFY TISSUE: CPT

## 2024-07-11 PROCEDURE — 6360000002 HC RX W HCPCS: Performed by: THORACIC SURGERY (CARDIOTHORACIC VASCULAR SURGERY)

## 2024-07-11 PROCEDURE — 36430 TRANSFUSION BLD/BLD COMPNT: CPT

## 2024-07-11 PROCEDURE — P9035 PLATELET PHERES LEUKOREDUCED: HCPCS

## 2024-07-11 PROCEDURE — 02HV33Z INSERTION OF INFUSION DEVICE INTO SUPERIOR VENA CAVA, PERCUTANEOUS APPROACH: ICD-10-PCS | Performed by: ANESTHESIOLOGY

## 2024-07-11 PROCEDURE — 83735 ASSAY OF MAGNESIUM: CPT

## 2024-07-11 PROCEDURE — 88305 TISSUE EXAM BY PATHOLOGIST: CPT

## 2024-07-11 PROCEDURE — P9012 CRYOPRECIPITATE EACH UNIT: HCPCS

## 2024-07-11 PROCEDURE — 82803 BLOOD GASES ANY COMBINATION: CPT

## 2024-07-11 PROCEDURE — 37799 UNLISTED PX VASCULAR SURGERY: CPT

## 2024-07-11 PROCEDURE — P9047 ALBUMIN (HUMAN), 25%, 50ML: HCPCS

## 2024-07-11 PROCEDURE — 3600000018 HC SURGERY OHS ADDTL 15MIN: Performed by: THORACIC SURGERY (CARDIOTHORACIC VASCULAR SURGERY)

## 2024-07-11 PROCEDURE — 36592 COLLECT BLOOD FROM PICC: CPT

## 2024-07-11 PROCEDURE — 94003 VENT MGMT INPAT SUBQ DAY: CPT

## 2024-07-11 PROCEDURE — 6370000000 HC RX 637 (ALT 250 FOR IP): Performed by: ANESTHESIOLOGY

## 2024-07-11 PROCEDURE — 84295 ASSAY OF SERUM SODIUM: CPT

## 2024-07-11 PROCEDURE — C1889 IMPLANT/INSERT DEVICE, NOC: HCPCS | Performed by: THORACIC SURGERY (CARDIOTHORACIC VASCULAR SURGERY)

## 2024-07-11 PROCEDURE — 6360000002 HC RX W HCPCS

## 2024-07-11 PROCEDURE — 85610 PROTHROMBIN TIME: CPT

## 2024-07-11 PROCEDURE — 82962 GLUCOSE BLOOD TEST: CPT

## 2024-07-11 PROCEDURE — 0BH17EZ INSERTION OF ENDOTRACHEAL AIRWAY INTO TRACHEA, VIA NATURAL OR ARTIFICIAL OPENING: ICD-10-PCS | Performed by: NURSE ANESTHETIST, CERTIFIED REGISTERED

## 2024-07-11 PROCEDURE — 2500000003 HC RX 250 WO HCPCS: Performed by: NURSE ANESTHETIST, CERTIFIED REGISTERED

## 2024-07-11 PROCEDURE — 30233R1 TRANSFUSION OF NONAUTOLOGOUS PLATELETS INTO PERIPHERAL VEIN, PERCUTANEOUS APPROACH: ICD-10-PCS | Performed by: PHYSICIAN ASSISTANT

## 2024-07-11 PROCEDURE — 84132 ASSAY OF SERUM POTASSIUM: CPT

## 2024-07-11 PROCEDURE — 85730 THROMBOPLASTIN TIME PARTIAL: CPT

## 2024-07-11 PROCEDURE — 2500000003 HC RX 250 WO HCPCS: Performed by: PHYSICIAN ASSISTANT

## 2024-07-11 PROCEDURE — 2580000003 HC RX 258: Performed by: THORACIC SURGERY (CARDIOTHORACIC VASCULAR SURGERY)

## 2024-07-11 PROCEDURE — 85576 BLOOD PLATELET AGGREGATION: CPT

## 2024-07-11 PROCEDURE — C1729 CATH, DRAINAGE: HCPCS | Performed by: THORACIC SURGERY (CARDIOTHORACIC VASCULAR SURGERY)

## 2024-07-11 PROCEDURE — 82330 ASSAY OF CALCIUM: CPT

## 2024-07-11 PROCEDURE — 85384 FIBRINOGEN ACTIVITY: CPT

## 2024-07-11 PROCEDURE — 3700000001 HC ADD 15 MINUTES (ANESTHESIA): Performed by: THORACIC SURGERY (CARDIOTHORACIC VASCULAR SURGERY)

## 2024-07-11 PROCEDURE — 6360000002 HC RX W HCPCS: Performed by: PHYSICIAN ASSISTANT

## 2024-07-11 PROCEDURE — 03HY32Z INSERTION OF MONITORING DEVICE INTO UPPER ARTERY, PERCUTANEOUS APPROACH: ICD-10-PCS | Performed by: NURSE ANESTHETIST, CERTIFIED REGISTERED

## 2024-07-11 PROCEDURE — 2580000003 HC RX 258

## 2024-07-11 PROCEDURE — 93005 ELECTROCARDIOGRAM TRACING: CPT | Performed by: PHYSICIAN ASSISTANT

## 2024-07-11 PROCEDURE — 82947 ASSAY GLUCOSE BLOOD QUANT: CPT

## 2024-07-11 PROCEDURE — 2500000003 HC RX 250 WO HCPCS

## 2024-07-11 PROCEDURE — B24BZZ4 ULTRASONOGRAPHY OF HEART WITH AORTA, TRANSESOPHAGEAL: ICD-10-PCS | Performed by: ANESTHESIOLOGY

## 2024-07-11 PROCEDURE — 85027 COMPLETE CBC AUTOMATED: CPT

## 2024-07-11 PROCEDURE — 2580000003 HC RX 258: Performed by: PHYSICIAN ASSISTANT

## 2024-07-11 PROCEDURE — 71045 X-RAY EXAM CHEST 1 VIEW: CPT

## 2024-07-11 PROCEDURE — 99233 SBSQ HOSP IP/OBS HIGH 50: CPT | Performed by: INTERNAL MEDICINE

## 2024-07-11 PROCEDURE — P9059 PLASMA, FRZ BETWEEN 8-24HOUR: HCPCS

## 2024-07-11 PROCEDURE — 6360000002 HC RX W HCPCS: Performed by: NURSE ANESTHETIST, CERTIFIED REGISTERED

## 2024-07-11 PROCEDURE — 2709999900 HC NON-CHARGEABLE SUPPLY: Performed by: THORACIC SURGERY (CARDIOTHORACIC VASCULAR SURGERY)

## 2024-07-11 PROCEDURE — 30233L1 TRANSFUSION OF NONAUTOLOGOUS FRESH PLASMA INTO PERIPHERAL VEIN, PERCUTANEOUS APPROACH: ICD-10-PCS | Performed by: PHYSICIAN ASSISTANT

## 2024-07-11 PROCEDURE — 85347 COAGULATION TIME ACTIVATED: CPT

## 2024-07-11 PROCEDURE — 30233N1 TRANSFUSION OF NONAUTOLOGOUS RED BLOOD CELLS INTO PERIPHERAL VEIN, PERCUTANEOUS APPROACH: ICD-10-PCS | Performed by: PHYSICIAN ASSISTANT

## 2024-07-11 PROCEDURE — 2580000003 HC RX 258: Performed by: ANESTHESIOLOGY

## 2024-07-11 PROCEDURE — P9045 ALBUMIN (HUMAN), 5%, 250 ML: HCPCS | Performed by: NURSE ANESTHETIST, CERTIFIED REGISTERED

## 2024-07-11 PROCEDURE — 36415 COLL VENOUS BLD VENIPUNCTURE: CPT

## 2024-07-11 PROCEDURE — 3600000008 HC SURGERY OHS BASE: Performed by: THORACIC SURGERY (CARDIOTHORACIC VASCULAR SURGERY)

## 2024-07-11 PROCEDURE — C1751 CATH, INF, PER/CENT/MIDLINE: HCPCS | Performed by: THORACIC SURGERY (CARDIOTHORACIC VASCULAR SURGERY)

## 2024-07-11 PROCEDURE — 85520 HEPARIN ASSAY: CPT

## 2024-07-11 PROCEDURE — P9041 ALBUMIN (HUMAN),5%, 50ML: HCPCS | Performed by: THORACIC SURGERY (CARDIOTHORACIC VASCULAR SURGERY)

## 2024-07-11 PROCEDURE — 6370000000 HC RX 637 (ALT 250 FOR IP): Performed by: NURSE PRACTITIONER

## 2024-07-11 PROCEDURE — 85530 HEPARIN-PROTAMINE TOLERANCE: CPT

## 2024-07-11 PROCEDURE — 2720000010 HC SURG SUPPLY STERILE: Performed by: THORACIC SURGERY (CARDIOTHORACIC VASCULAR SURGERY)

## 2024-07-11 PROCEDURE — 02RF08Z REPLACEMENT OF AORTIC VALVE WITH ZOOPLASTIC TISSUE, OPEN APPROACH: ICD-10-PCS | Performed by: THORACIC SURGERY (CARDIOTHORACIC VASCULAR SURGERY)

## 2024-07-11 PROCEDURE — 93010 ELECTROCARDIOGRAM REPORT: CPT | Performed by: INTERNAL MEDICINE

## 2024-07-11 PROCEDURE — 2100000001 HC CVICU R&B

## 2024-07-11 PROCEDURE — 02RG08Z REPLACEMENT OF MITRAL VALVE WITH ZOOPLASTIC TISSUE, OPEN APPROACH: ICD-10-PCS | Performed by: THORACIC SURGERY (CARDIOTHORACIC VASCULAR SURGERY)

## 2024-07-11 PROCEDURE — 3700000000 HC ANESTHESIA ATTENDED CARE: Performed by: THORACIC SURGERY (CARDIOTHORACIC VASCULAR SURGERY)

## 2024-07-11 PROCEDURE — P9016 RBC LEUKOCYTES REDUCED: HCPCS

## 2024-07-11 PROCEDURE — C1769 GUIDE WIRE: HCPCS | Performed by: THORACIC SURGERY (CARDIOTHORACIC VASCULAR SURGERY)

## 2024-07-11 DEVICE — IMPLANTABLE DEVICE: Type: IMPLANTABLE DEVICE | Site: MITRAL VALVE | Status: FUNCTIONAL

## 2024-07-11 DEVICE — VALVE AORT 21MM REPL RESILIENT BOV PERICARD CO CHROMIUM ALLY: Type: IMPLANTABLE DEVICE | Site: MITRAL VALVE | Status: FUNCTIONAL

## 2024-07-11 RX ORDER — VASOPRESSIN 20 U/ML
INJECTION PARENTERAL PRN
Status: DISCONTINUED | OUTPATIENT
Start: 2024-07-11 | End: 2024-07-11 | Stop reason: SDUPTHER

## 2024-07-11 RX ORDER — SODIUM CHLORIDE 9 MG/ML
INJECTION, SOLUTION INTRAVENOUS PRN
Status: DISCONTINUED | OUTPATIENT
Start: 2024-07-11 | End: 2024-07-15

## 2024-07-11 RX ORDER — SODIUM CHLORIDE 9 MG/ML
INJECTION, SOLUTION INTRAVENOUS CONTINUOUS PRN
Status: DISCONTINUED | OUTPATIENT
Start: 2024-07-11 | End: 2024-07-11 | Stop reason: SDUPTHER

## 2024-07-11 RX ORDER — MIDAZOLAM HYDROCHLORIDE 2 MG/2ML
2 INJECTION, SOLUTION INTRAMUSCULAR; INTRAVENOUS
Status: DISCONTINUED | OUTPATIENT
Start: 2024-07-11 | End: 2024-07-11 | Stop reason: HOSPADM

## 2024-07-11 RX ORDER — LIDOCAINE HYDROCHLORIDE 20 MG/ML
INJECTION, SOLUTION INTRAVENOUS PRN
Status: DISCONTINUED | OUTPATIENT
Start: 2024-07-11 | End: 2024-07-11 | Stop reason: SDUPTHER

## 2024-07-11 RX ORDER — MORPHINE SULFATE 4 MG/ML
3 INJECTION, SOLUTION INTRAMUSCULAR; INTRAVENOUS
Status: DISCONTINUED | OUTPATIENT
Start: 2024-07-11 | End: 2024-07-15

## 2024-07-11 RX ORDER — FAMOTIDINE 20 MG/1
20 TABLET, FILM COATED ORAL 2 TIMES DAILY
Status: DISCONTINUED | OUTPATIENT
Start: 2024-07-11 | End: 2024-07-15

## 2024-07-11 RX ORDER — INSULIN LISPRO 100 [IU]/ML
0-6 INJECTION, SOLUTION INTRAVENOUS; SUBCUTANEOUS NIGHTLY
Status: DISCONTINUED | OUTPATIENT
Start: 2024-07-12 | End: 2024-07-15

## 2024-07-11 RX ORDER — ALBUMIN, HUMAN INJ 5% 5 %
SOLUTION INTRAVENOUS PRN
Status: DISCONTINUED | OUTPATIENT
Start: 2024-07-11 | End: 2024-07-11 | Stop reason: SDUPTHER

## 2024-07-11 RX ORDER — ROCURONIUM BROMIDE 10 MG/ML
INJECTION, SOLUTION INTRAVENOUS PRN
Status: DISCONTINUED | OUTPATIENT
Start: 2024-07-11 | End: 2024-07-11 | Stop reason: SDUPTHER

## 2024-07-11 RX ORDER — ASPIRIN 81 MG/1
81 TABLET, CHEWABLE ORAL ONCE
Status: COMPLETED | OUTPATIENT
Start: 2024-07-11 | End: 2024-07-11

## 2024-07-11 RX ORDER — VECURONIUM BROMIDE 1 MG/ML
INJECTION, POWDER, LYOPHILIZED, FOR SOLUTION INTRAVENOUS PRN
Status: DISCONTINUED | OUTPATIENT
Start: 2024-07-11 | End: 2024-07-11 | Stop reason: SDUPTHER

## 2024-07-11 RX ORDER — INSULIN LISPRO 100 [IU]/ML
0-6 INJECTION, SOLUTION INTRAVENOUS; SUBCUTANEOUS
Status: DISCONTINUED | OUTPATIENT
Start: 2024-07-12 | End: 2024-07-15

## 2024-07-11 RX ORDER — SODIUM CHLORIDE 0.9 % (FLUSH) 0.9 %
5-40 SYRINGE (ML) INJECTION EVERY 12 HOURS SCHEDULED
Status: DISCONTINUED | OUTPATIENT
Start: 2024-07-11 | End: 2024-07-11

## 2024-07-11 RX ORDER — EPHEDRINE SULFATE 5 MG/ML
INJECTION INTRAVENOUS PRN
Status: DISCONTINUED | OUTPATIENT
Start: 2024-07-11 | End: 2024-07-11 | Stop reason: SDUPTHER

## 2024-07-11 RX ORDER — NOREPINEPHRINE BITARTRATE 0.02 MG/ML
.01-.2 INJECTION, SOLUTION INTRAVENOUS CONTINUOUS
Status: DISCONTINUED | OUTPATIENT
Start: 2024-07-11 | End: 2024-07-12

## 2024-07-11 RX ORDER — OXYCODONE HYDROCHLORIDE AND ACETAMINOPHEN 5; 325 MG/1; MG/1
1 TABLET ORAL EVERY 4 HOURS PRN
Status: DISCONTINUED | OUTPATIENT
Start: 2024-07-11 | End: 2024-07-15

## 2024-07-11 RX ORDER — DOBUTAMINE HYDROCHLORIDE 200 MG/100ML
2.5-1 INJECTION INTRAVENOUS CONTINUOUS
Status: DISCONTINUED | OUTPATIENT
Start: 2024-07-11 | End: 2024-07-15

## 2024-07-11 RX ORDER — DEXMEDETOMIDINE HYDROCHLORIDE 4 UG/ML
.1-1.5 INJECTION, SOLUTION INTRAVENOUS CONTINUOUS
Status: DISCONTINUED | OUTPATIENT
Start: 2024-07-11 | End: 2024-07-12

## 2024-07-11 RX ORDER — FENTANYL CITRATE 0.05 MG/ML
INJECTION, SOLUTION INTRAMUSCULAR; INTRAVENOUS PRN
Status: DISCONTINUED | OUTPATIENT
Start: 2024-07-11 | End: 2024-07-11 | Stop reason: SDUPTHER

## 2024-07-11 RX ORDER — MAGNESIUM SULFATE 1 G/100ML
1000 INJECTION INTRAVENOUS PRN
Status: DISCONTINUED | OUTPATIENT
Start: 2024-07-11 | End: 2024-07-15

## 2024-07-11 RX ORDER — DEXTROSE MONOHYDRATE 100 MG/ML
INJECTION, SOLUTION INTRAVENOUS CONTINUOUS PRN
Status: DISCONTINUED | OUTPATIENT
Start: 2024-07-11 | End: 2024-07-15 | Stop reason: SDUPTHER

## 2024-07-11 RX ORDER — MIDAZOLAM HYDROCHLORIDE 1 MG/ML
INJECTION INTRAMUSCULAR; INTRAVENOUS PRN
Status: DISCONTINUED | OUTPATIENT
Start: 2024-07-11 | End: 2024-07-11 | Stop reason: SDUPTHER

## 2024-07-11 RX ORDER — CALCIUM CHLORIDE 100 MG/ML
INJECTION INTRAVENOUS; INTRAVENTRICULAR PRN
Status: DISCONTINUED | OUTPATIENT
Start: 2024-07-11 | End: 2024-07-11 | Stop reason: SDUPTHER

## 2024-07-11 RX ORDER — AMIODARONE HYDROCHLORIDE 150 MG/3ML
INJECTION, SOLUTION INTRAVENOUS PRN
Status: DISCONTINUED | OUTPATIENT
Start: 2024-07-11 | End: 2024-07-11 | Stop reason: SDUPTHER

## 2024-07-11 RX ORDER — DEXTROSE MONOHYDRATE, SODIUM CHLORIDE, AND POTASSIUM CHLORIDE 50; 1.49; 4.5 G/1000ML; G/1000ML; G/1000ML
INJECTION, SOLUTION INTRAVENOUS CONTINUOUS
Status: DISCONTINUED | OUTPATIENT
Start: 2024-07-11 | End: 2024-07-12

## 2024-07-11 RX ORDER — DEXMEDETOMIDINE HYDROCHLORIDE 4 UG/ML
INJECTION, SOLUTION INTRAVENOUS CONTINUOUS PRN
Status: DISCONTINUED | OUTPATIENT
Start: 2024-07-11 | End: 2024-07-11 | Stop reason: SDUPTHER

## 2024-07-11 RX ORDER — SODIUM CHLORIDE 0.9 % (FLUSH) 0.9 %
5-40 SYRINGE (ML) INJECTION PRN
Status: DISCONTINUED | OUTPATIENT
Start: 2024-07-11 | End: 2024-07-15

## 2024-07-11 RX ORDER — SODIUM CHLORIDE, SODIUM LACTATE, POTASSIUM CHLORIDE, CALCIUM CHLORIDE 600; 310; 30; 20 MG/100ML; MG/100ML; MG/100ML; MG/100ML
INJECTION, SOLUTION INTRAVENOUS CONTINUOUS PRN
Status: DISCONTINUED | OUTPATIENT
Start: 2024-07-11 | End: 2024-07-11 | Stop reason: SDUPTHER

## 2024-07-11 RX ORDER — FENTANYL CITRATE 50 UG/ML
100 INJECTION, SOLUTION INTRAMUSCULAR; INTRAVENOUS
Status: DISCONTINUED | OUTPATIENT
Start: 2024-07-11 | End: 2024-07-11 | Stop reason: HOSPADM

## 2024-07-11 RX ORDER — CHLORHEXIDINE GLUCONATE ORAL RINSE 1.2 MG/ML
10 SOLUTION DENTAL 2 TIMES DAILY
Status: DISCONTINUED | OUTPATIENT
Start: 2024-07-11 | End: 2024-07-12

## 2024-07-11 RX ORDER — SODIUM CHLORIDE 0.9 % (FLUSH) 0.9 %
5-40 SYRINGE (ML) INJECTION EVERY 12 HOURS SCHEDULED
Status: DISCONTINUED | OUTPATIENT
Start: 2024-07-11 | End: 2024-07-15

## 2024-07-11 RX ORDER — AMIODARONE HYDROCHLORIDE 200 MG/1
200 TABLET ORAL 2 TIMES DAILY
Status: DISCONTINUED | OUTPATIENT
Start: 2024-07-11 | End: 2024-07-15

## 2024-07-11 RX ORDER — LIDOCAINE HYDROCHLORIDE 10 MG/ML
1 INJECTION, SOLUTION INFILTRATION; PERINEURAL
Status: DISCONTINUED | OUTPATIENT
Start: 2024-07-11 | End: 2024-07-11 | Stop reason: HOSPADM

## 2024-07-11 RX ORDER — NITROGLYCERIN 20 MG/100ML
0-100 INJECTION INTRAVENOUS CONTINUOUS PRN
Status: DISCONTINUED | OUTPATIENT
Start: 2024-07-11 | End: 2024-07-15

## 2024-07-11 RX ORDER — SODIUM CHLORIDE, SODIUM LACTATE, POTASSIUM CHLORIDE, CALCIUM CHLORIDE 600; 310; 30; 20 MG/100ML; MG/100ML; MG/100ML; MG/100ML
INJECTION, SOLUTION INTRAVENOUS CONTINUOUS
Status: DISCONTINUED | OUTPATIENT
Start: 2024-07-11 | End: 2024-07-11 | Stop reason: HOSPADM

## 2024-07-11 RX ORDER — MIDAZOLAM HYDROCHLORIDE 2 MG/2ML
1 INJECTION, SOLUTION INTRAMUSCULAR; INTRAVENOUS
Status: DISCONTINUED | OUTPATIENT
Start: 2024-07-11 | End: 2024-07-15

## 2024-07-11 RX ORDER — NOREPINEPHRINE BITARTRATE 1 MG/ML
INJECTION, SOLUTION INTRAVENOUS PRN
Status: DISCONTINUED | OUTPATIENT
Start: 2024-07-11 | End: 2024-07-11 | Stop reason: SDUPTHER

## 2024-07-11 RX ORDER — NITROGLYCERIN 20 MG/100ML
INJECTION INTRAVENOUS PRN
Status: DISCONTINUED | OUTPATIENT
Start: 2024-07-11 | End: 2024-07-11 | Stop reason: SDUPTHER

## 2024-07-11 RX ORDER — AMINOCAPROIC ACID 250 MG/ML
INJECTION, SOLUTION INTRAVENOUS PRN
Status: DISCONTINUED | OUTPATIENT
Start: 2024-07-11 | End: 2024-07-11 | Stop reason: SDUPTHER

## 2024-07-11 RX ORDER — ACETAMINOPHEN 325 MG/1
650 TABLET ORAL EVERY 4 HOURS PRN
Status: DISCONTINUED | OUTPATIENT
Start: 2024-07-11 | End: 2024-07-15

## 2024-07-11 RX ORDER — HEPARIN SODIUM 1000 [USP'U]/ML
INJECTION, SOLUTION INTRAVENOUS; SUBCUTANEOUS PRN
Status: DISCONTINUED | OUTPATIENT
Start: 2024-07-11 | End: 2024-07-11 | Stop reason: SDUPTHER

## 2024-07-11 RX ORDER — ALBUMIN, HUMAN INJ 5% 5 %
25 SOLUTION INTRAVENOUS ONCE
Status: COMPLETED | OUTPATIENT
Start: 2024-07-11 | End: 2024-07-11

## 2024-07-11 RX ORDER — ONDANSETRON 2 MG/ML
4 INJECTION INTRAMUSCULAR; INTRAVENOUS EVERY 4 HOURS PRN
Status: DISCONTINUED | OUTPATIENT
Start: 2024-07-11 | End: 2024-07-15

## 2024-07-11 RX ORDER — ETOMIDATE 2 MG/ML
INJECTION INTRAVENOUS PRN
Status: DISCONTINUED | OUTPATIENT
Start: 2024-07-11 | End: 2024-07-11 | Stop reason: SDUPTHER

## 2024-07-11 RX ORDER — CARVEDILOL 3.12 MG/1
3.12 TABLET ORAL 2 TIMES DAILY
Status: DISCONTINUED | OUTPATIENT
Start: 2024-07-12 | End: 2024-07-14

## 2024-07-11 RX ORDER — SODIUM CHLORIDE 0.9 % (FLUSH) 0.9 %
5-40 SYRINGE (ML) INJECTION PRN
Status: DISCONTINUED | OUTPATIENT
Start: 2024-07-11 | End: 2024-07-11

## 2024-07-11 RX ORDER — PROTAMINE SULFATE 10 MG/ML
INJECTION, SOLUTION INTRAVENOUS PRN
Status: DISCONTINUED | OUTPATIENT
Start: 2024-07-11 | End: 2024-07-11 | Stop reason: SDUPTHER

## 2024-07-11 RX ORDER — NOREPINEPHRINE BITARTRATE 0.02 MG/ML
INJECTION, SOLUTION INTRAVENOUS CONTINUOUS PRN
Status: DISCONTINUED | OUTPATIENT
Start: 2024-07-11 | End: 2024-07-11 | Stop reason: SDUPTHER

## 2024-07-11 RX ORDER — SODIUM CHLORIDE 9 MG/ML
INJECTION, SOLUTION INTRAVENOUS PRN
Status: DISCONTINUED | OUTPATIENT
Start: 2024-07-11 | End: 2024-07-11 | Stop reason: HOSPADM

## 2024-07-11 RX ORDER — MORPHINE SULFATE 4 MG/ML
4 INJECTION, SOLUTION INTRAMUSCULAR; INTRAVENOUS
Status: DISCONTINUED | OUTPATIENT
Start: 2024-07-11 | End: 2024-07-15

## 2024-07-11 RX ORDER — POTASSIUM CHLORIDE 29.8 MG/ML
20 INJECTION INTRAVENOUS PRN
Status: DISCONTINUED | OUTPATIENT
Start: 2024-07-11 | End: 2024-07-15

## 2024-07-11 RX ORDER — ASPIRIN 81 MG/1
81 TABLET ORAL DAILY
Status: DISCONTINUED | OUTPATIENT
Start: 2024-07-12 | End: 2024-07-23 | Stop reason: HOSPADM

## 2024-07-11 RX ADMIN — NOREPINEPHRINE BITARTRATE 16 MCG: 1 SOLUTION INTRAVENOUS at 11:53

## 2024-07-11 RX ADMIN — POTASSIUM CHLORIDE, DEXTROSE MONOHYDRATE AND SODIUM CHLORIDE: 150; 5; 450 INJECTION, SOLUTION INTRAVENOUS at 15:01

## 2024-07-11 RX ADMIN — TUBERCULIN PURIFIED PROTEIN DERIVATIVE 5 UNITS: 5 INJECTION, SOLUTION INTRADERMAL at 21:42

## 2024-07-11 RX ADMIN — ALBUMIN (HUMAN) 12.5 G: 12.5 INJECTION, SOLUTION INTRAVENOUS at 08:16

## 2024-07-11 RX ADMIN — FENTANYL CITRATE 250 MCG: 0.05 INJECTION, SOLUTION INTRAMUSCULAR; INTRAVENOUS at 08:35

## 2024-07-11 RX ADMIN — EPINEPHRINE 0.02 MCG/KG/MIN: 1 INJECTION INTRAMUSCULAR; INTRAVENOUS; SUBCUTANEOUS at 11:25

## 2024-07-11 RX ADMIN — Medication 0.03 MCG/KG/MIN: at 11:34

## 2024-07-11 RX ADMIN — LEVOTHYROXINE SODIUM 75 MCG: 0.07 TABLET ORAL at 04:12

## 2024-07-11 RX ADMIN — Medication 2000 MG: at 08:00

## 2024-07-11 RX ADMIN — MIDAZOLAM 3 MG: 1 INJECTION INTRAMUSCULAR; INTRAVENOUS at 10:46

## 2024-07-11 RX ADMIN — ETOMIDATE 18 MG: 2 INJECTION, SOLUTION INTRAVENOUS at 07:18

## 2024-07-11 RX ADMIN — MIDAZOLAM 2 MG: 1 INJECTION INTRAMUSCULAR; INTRAVENOUS at 07:06

## 2024-07-11 RX ADMIN — EPHEDRINE SULFATE 5 MG: 5 INJECTION INTRAVENOUS at 07:33

## 2024-07-11 RX ADMIN — FAMOTIDINE 20 MG: 20 TABLET, FILM COATED ORAL at 21:03

## 2024-07-11 RX ADMIN — CALCIUM CHLORIDE 1 G: 100 INJECTION, SOLUTION INTRAVENOUS at 12:27

## 2024-07-11 RX ADMIN — NOREPINEPHRINE BITARTRATE 8 MCG: 1 SOLUTION INTRAVENOUS at 08:10

## 2024-07-11 RX ADMIN — CEFAZOLIN 2000 MG: 10 INJECTION, POWDER, FOR SOLUTION INTRAVENOUS at 20:57

## 2024-07-11 RX ADMIN — FENTANYL CITRATE 150 MCG: 0.05 INJECTION, SOLUTION INTRAMUSCULAR; INTRAVENOUS at 07:54

## 2024-07-11 RX ADMIN — Medication 2000 MG: at 11:30

## 2024-07-11 RX ADMIN — SODIUM CHLORIDE, POTASSIUM CHLORIDE, SODIUM LACTATE AND CALCIUM CHLORIDE: 600; 310; 30; 20 INJECTION, SOLUTION INTRAVENOUS at 06:11

## 2024-07-11 RX ADMIN — POTASSIUM CHLORIDE 20 MEQ: 400 INJECTION, SOLUTION INTRAVENOUS at 21:13

## 2024-07-11 RX ADMIN — NOREPINEPHRINE BITARTRATE 8 MCG: 1 SOLUTION INTRAVENOUS at 07:35

## 2024-07-11 RX ADMIN — Medication 1 AMPULE: at 20:57

## 2024-07-11 RX ADMIN — VECURONIUM BROMIDE 2 MG: 1 INJECTION, POWDER, LYOPHILIZED, FOR SOLUTION INTRAVENOUS at 09:18

## 2024-07-11 RX ADMIN — NITROGLYCERIN 20 MCG: 20 INJECTION INTRAVENOUS at 08:44

## 2024-07-11 RX ADMIN — FENTANYL CITRATE 250 MCG: 0.05 INJECTION, SOLUTION INTRAMUSCULAR; INTRAVENOUS at 08:28

## 2024-07-11 RX ADMIN — ROCURONIUM BROMIDE 30 MG: 10 INJECTION, SOLUTION INTRAVENOUS at 10:46

## 2024-07-11 RX ADMIN — AMIODARONE HYDROCHLORIDE 150 MG: 50 INJECTION, SOLUTION INTRAVENOUS at 11:28

## 2024-07-11 RX ADMIN — AMINOCAPROIC ACID 1 G/HR: 250 INJECTION, SOLUTION INTRAVENOUS at 09:00

## 2024-07-11 RX ADMIN — POTASSIUM CHLORIDE 20 MEQ: 400 INJECTION, SOLUTION INTRAVENOUS at 20:10

## 2024-07-11 RX ADMIN — ROSUVASTATIN CALCIUM 20 MG: 20 TABLET, FILM COATED ORAL at 21:04

## 2024-07-11 RX ADMIN — SODIUM CHLORIDE, SODIUM LACTATE, POTASSIUM CHLORIDE, AND CALCIUM CHLORIDE: 600; 310; 30; 20 INJECTION, SOLUTION INTRAVENOUS at 12:32

## 2024-07-11 RX ADMIN — NOREPINEPHRINE BITARTRATE 4 MCG: 1 SOLUTION INTRAVENOUS at 08:27

## 2024-07-11 RX ADMIN — DEXMEDETOMIDINE HYDROCHLORIDE 0.5 MCG/KG/HR: 4 INJECTION, SOLUTION INTRAVENOUS at 09:22

## 2024-07-11 RX ADMIN — DOBUTAMINE HYDROCHLORIDE 2 MCG/KG/MIN: 200 INJECTION INTRAVENOUS at 15:17

## 2024-07-11 RX ADMIN — CHLORHEXIDINE GLUCONATE 10 ML: 1.2 RINSE ORAL at 17:05

## 2024-07-11 RX ADMIN — Medication 3 AMPULE: at 06:10

## 2024-07-11 RX ADMIN — AMINOCAPROIC ACID 10000 MG: 250 INJECTION, SOLUTION INTRAVENOUS at 08:04

## 2024-07-11 RX ADMIN — SODIUM CHLORIDE: 9 INJECTION, SOLUTION INTRAVENOUS at 08:04

## 2024-07-11 RX ADMIN — ASPIRIN 81 MG 81 MG: 81 TABLET ORAL at 06:52

## 2024-07-11 RX ADMIN — CHLORHEXIDINE GLUCONATE 10 ML: 1.2 RINSE ORAL at 20:47

## 2024-07-11 RX ADMIN — ALBUMIN (HUMAN) 25 G: 12.5 INJECTION, SOLUTION INTRAVENOUS at 20:50

## 2024-07-11 RX ADMIN — AMIODARONE HYDROCHLORIDE 600 MG: 200 TABLET ORAL at 04:12

## 2024-07-11 RX ADMIN — HEPARIN SODIUM 28000 UNITS: 1000 INJECTION, SOLUTION INTRAVENOUS; SUBCUTANEOUS at 08:39

## 2024-07-11 RX ADMIN — NOREPINEPHRINE BITARTRATE 8 MCG: 1 SOLUTION INTRAVENOUS at 08:30

## 2024-07-11 RX ADMIN — NOREPINEPHRINE BITARTRATE 16 MCG: 1 SOLUTION INTRAVENOUS at 09:06

## 2024-07-11 RX ADMIN — VECURONIUM BROMIDE 5 MG: 1 INJECTION, POWDER, LYOPHILIZED, FOR SOLUTION INTRAVENOUS at 08:21

## 2024-07-11 RX ADMIN — SODIUM CHLORIDE 2.5 MG/HR: 9 INJECTION, SOLUTION INTRAVENOUS at 15:41

## 2024-07-11 RX ADMIN — FAMOTIDINE 20 MG: 20 TABLET, FILM COATED ORAL at 04:14

## 2024-07-11 RX ADMIN — VASOPRESSIN 1 UNITS: 20 INJECTION INTRAVENOUS at 12:07

## 2024-07-11 RX ADMIN — METOPROLOL SUCCINATE 25 MG: 25 TABLET, EXTENDED RELEASE ORAL at 04:14

## 2024-07-11 RX ADMIN — SODIUM CHLORIDE, PRESERVATIVE FREE 10 ML: 5 INJECTION INTRAVENOUS at 20:49

## 2024-07-11 RX ADMIN — ROCURONIUM BROMIDE 50 MG: 10 INJECTION, SOLUTION INTRAVENOUS at 07:18

## 2024-07-11 RX ADMIN — SODIUM CHLORIDE, SODIUM LACTATE, POTASSIUM CHLORIDE, AND CALCIUM CHLORIDE: 600; 310; 30; 20 INJECTION, SOLUTION INTRAVENOUS at 07:06

## 2024-07-11 RX ADMIN — LIDOCAINE HYDROCHLORIDE 60 MG: 20 INJECTION, SOLUTION INTRAVENOUS at 07:18

## 2024-07-11 RX ADMIN — OXYCODONE HYDROCHLORIDE AND ACETAMINOPHEN 1 TABLET: 5; 325 TABLET ORAL at 21:59

## 2024-07-11 RX ADMIN — FENTANYL CITRATE 100 MCG: 0.05 INJECTION, SOLUTION INTRAMUSCULAR; INTRAVENOUS at 07:20

## 2024-07-11 RX ADMIN — FENTANYL CITRATE 250 MCG: 0.05 INJECTION, SOLUTION INTRAMUSCULAR; INTRAVENOUS at 10:46

## 2024-07-11 RX ADMIN — PROTAMINE SULFATE 250 MG: 10 INJECTION, SOLUTION INTRAVENOUS at 11:45

## 2024-07-11 NOTE — ANESTHESIA PROCEDURE NOTES
Arterial Line:    An arterial line was placed using ultrasound guidance, in the OR for the following indication(s): continuous blood pressure monitoring and blood sampling needed.    A 20 gauge (size) (length), Arrow (type) catheter was placed, Seldinger technique used, into the left radial artery, secured by Tegaderm and tape.  Anesthesia type: Local  Local infiltration: Injection    Events:  patient tolerated procedure well with no complications and EBL < 5mL.    Additional notes:  Left arm prepped with ChloraPrep, 0.8ml of 1% lidocaine infiltrated at skin, ultrasound guided Seldinger technique, good blood return, good waveform.      Potential access sites were examined with ultrasound and acceptable patent access site selected as noted above.  Needle path and artery access visualized in real time using ultrasound, an image of wire in vessel recorded for permanent record.    7/11/2024 7:10 AM7/11/2024 7:13 AM  Resident/CRNA: Michele Butler APRN - CRNA  Performed: Resident/CRNA   Preanesthetic Checklist  Completed: patient identified, IV checked, site marked, risks and benefits discussed, surgical/procedural consents, equipment checked, pre-op evaluation, timeout performed, anesthesia consent given, oxygen available, monitors applied/VS acknowledged, fire risk safety assessment completed and verbalized and blood product R/B/A discussed and consented

## 2024-07-11 NOTE — ANESTHESIA PROCEDURE NOTES
Procedure Performed: JAY       Start Time:  7/11/2024 7:55 AM       End Time:   7/11/2024 8:05 AM    Preanesthesia Checklist:  Patient identified, IV assessed, risks and benefits discussed, monitors and equipment assessed, procedure being performed at surgeon's request and anesthesia consent obtained.    General Procedure Information  Diagnostic Indications for Echo:  assessment of surgical repair and assessment of valve function  Location performed:  OR  Intubated  Bite block placed  Heart visualized  Probe Insertion:  Easy  Probe Type:  Mulitplane  Modalities:  2D only, pulse wave Doppler and color flow mapping    Echocardiographic and Doppler Measurements    Ventricles    Left Ventricle:  Thrombus not present.  Global Function mildly impaired.          Valves    Aortic Valve:  Stenosis moderate.  Regurgitation moderate.  Leaflets calcified.  Leaflet motions restricted.      Mitral Valve:  Regurgitation severe.  Leaflets calcified.          Aorta    Descending Aorta:  Size normal.  Dissection not present.                        Anesthesia Information  Performed Personally  Anesthesiologist:  Art Boland MD    Post Intervention Follow-up Study  NoneComments: Post bypass images shown to and  d/w surgeon including MV, AV, and LV function.

## 2024-07-11 NOTE — BRIEF OP NOTE
Brief Postoperative Note      Patient: Tanika Medina  YOB: 1945  MRN: [id number]    Date of Procedure: 7/11/2024    Pre-Op Diagnosis Codes:     * Aortic valve stenosis [I35.0]     * Mitral regurgitation [I34.0]     * Shortness of breath [R06.02]    Post-Op Diagnosis: Same       Procedure(s):  AORTIC VALVE REPLACEMENT; MITRAL VALVE REPLACEMENT  TRANSESOPHAGEAL ECHOCARDIOGRAM    Surgeon(s):  Rambo Ellis MD    Assistant:  Surgical Assistant: Meera Vasquez    Anesthesia: General    Estimated Blood Loss (mL): Minimal    Complications: None    Specimens:   ID Type Source Tests Collected by Time Destination   A : AORTIC VALVE Tissue Heart SURGICAL PATHOLOGY Rambo Ellis MD 7/11/2024 0931    B : MITRAL VALVE Tissue Heart SURGICAL PATHOLOGY Rambo Ellis MD 7/11/2024 0945        Implants:  Implant Name Type Inv. Item Serial No.  Lot No. LRB No. Used Action   VALVE MITRAL 25MM MITRIS RESILIA - [id number] Mitral valves VALVE MITRAL 25MM MITRIS RESILIA [id number] [employer]  N/A 1 Implanted   VALVE AORT 21MM REPL RESILIENT BOV PERICARD CO CHROMIUM ALLY - [id number] Aortic valves VALVE AORT 21MM REPL RESILIENT BOV PERICARD CO CHROMIUM ALLY [id number] [employer]WD  N/A 1 Implanted         Drains:   Chest Tube Right;Other (Comment) Mediastinal 1 (Active)       Chest Tube Left Mediastinal 2 (Active)       Urinary Catheter 07/11/24 2 Way;Gross-Temperature (Active)       [REMOVED] NG/OG/NJ/NE Tube Nasogastric 14 fr Right nostril (Removed)   Surrounding Skin Clean, dry & intact 07/01/24 0714   Securement device Tape 07/01/24 0714   Status Clamped 07/01/24 0714   Placement Verified External Catheter Length 07/01/24 0714   NG/OG/NJ/NE External Measurement (cm) 60 cm 07/01/24 0714   Drainage Appearance Green 07/01/24 0714   Free Water/Flush (mL) 60 mL 06/30/24 0914   Action Taken Placement verified (comment) 07/01/24 0400       [REMOVED] Urinary Catheter 06/29/24 3

## 2024-07-11 NOTE — ANESTHESIA PROCEDURE NOTES
Central Venous Line:    A central venous line was placed using ultrasound guidance, in the OR for the following indication(s): central venous access, CVP monitoring and vasoactive infusions.7/11/2024 7:27 AM7/11/2024 7:45 AM    Sterility preparation included the following: provider used sterile gloves, gown, hat and mask, hand hygiene performed prior to central venous catheter insertion, sterile gel and probe cover used in ultrasound-guided central venous catheter insertion and maximum sterile barriers used during central venous catheter insertion.    The patient was placed in Trendelenburg position.The right internal jugular vein was prepped.    The site was prepped with Chloraprep.  A 9 Fr (size), 12 (length), introducer double lumen was placed.    During the procedure, the following specific steps were taken: target vein identified, needle advanced into vein and blood aspirated and guidewire advanced into vein.    Intravenous verification was obtained by ultrasound, venous blood return and manometry.    Post insertion care included: all ports aspirated, all ports flushed easily, guidewire removed intact, line sutured in place and dressing applied.        Outcomes: uncomplicated and patient tolerated procedure well  Real-time US image taken/store: Yes  Anesthesia type: generalA(n) oximetric,right internal jugular vein.  The PAC placement was confirmed by pressure tracing changes and JAY and secured at 45 cm (depth).  The patient experienced the following events during the procedure: patient tolerated procedure well with no complications.PA Cath placed?: Yes    Additional notes:  Vein accessed on first stick with real time ultrasound guidance.  Guidewire visualized in vein on ultrasound.  Easy catheter insertion.  Chest x-ray to be performed in ICU.  Seven step protocol followed.    Potential access sites were examined with ultrasound and the acceptable patent access site was selected.  The needle path and vessel

## 2024-07-11 NOTE — PERIOP NOTE
TRANSFER - OUT REPORT:    Verbal report given to DARLENE MCGUIRE on Tanika Medina  being transferred to CVICU for routine progression of patient care       Report consisted of patient's Situation, Background, Assessment and   Recommendations(SBAR).     Information from the following report(s) Nurse Handoff Report and Surgery Report was reviewed with the receiving nurse.           Lines:   Pulmonary Artery Cath  07/11/24 (Active)       Peripheral IV 07/08/24 Right Cephalic (Active)   Site Assessment Clean, dry & intact 07/11/24 0412   Line Status Capped;Normal saline locked 07/11/24 0412   Line Care Connections checked and tightened 07/11/24 0412   Phlebitis Assessment No symptoms 07/11/24 0412   Infiltration Assessment 0 07/11/24 0412   Alcohol Cap Used Yes 07/11/24 0412   Dressing Status Clean, dry & intact 07/11/24 0412   Dressing Type Transparent 07/11/24 0412   Dressing Intervention New 07/08/24 0743       Peripheral IV 07/11/24 Posterior;Right Hand (Active)   Site Assessment Clean, dry & intact 07/11/24 0543   Line Status Blood return noted;Flushed;Infusing 07/11/24 0543   Line Care Connections checked and tightened 07/11/24 0543   Phlebitis Assessment No symptoms 07/11/24 0543   Infiltration Assessment 0 07/11/24 0543   Alcohol Cap Used No 07/11/24 0543   Dressing Status New dressing applied;Clean, dry & intact 07/11/24 0543   Dressing Type Transparent 07/11/24 0543       Arterial Line 07/11/24 Radial (Active)       Introducer 07/11/24 (Active)        Opportunity for questions and clarification was provided.      Patient transported with:  Monitor, O2 @ 15 lpm, and Registered Nurse, CRNA, STACI

## 2024-07-11 NOTE — ANESTHESIA PROCEDURE NOTES
Airway  Date/Time: 7/11/2024 7:24 AM  Urgency: elective    Airway not difficult    General Information and Staff    Patient location during procedure: OR  Resident/CRNA: Michele Butler APRN - CRNA  Performed: resident/CRNA   Performed by: Michele Butler APRN - CRNA  Authorized by: Art Boland MD      Indications and Patient Condition  Indications for airway management: anesthesia  Spontaneous Ventilation: absent  Sedation level: deep  Preoxygenated: yes  Patient position: sniffing  MILS not maintained throughout  Mask difficulty assessment: vent by bag mask    Final Airway Details  Final airway type: endotracheal airway      Successful airway: ETT  Cuffed: yes   Successful intubation technique: direct laryngoscopy  Facilitating devices/methods: intubating stylet and cricoid pressure  Endotracheal tube insertion site: oral  Blade: Oliva  Blade size: #3  ETT size (mm): 7.0  Cormack-Lehane Classification: grade I - full view of glottis  Placement verified by: chest auscultation and capnometry   Inital cuff pressure (cm H2O): 25  Measured from: lips  ETT to lips (cm): 22  Number of attempts at approach: 1  Number of other approaches attempted: 0    no

## 2024-07-11 NOTE — ANESTHESIA POSTPROCEDURE EVALUATION
Department of Anesthesiology  Postprocedure Note    Patient: Tanika Medina  MRN: [id number]  YOB: 1945  Date of evaluation: 7/11/2024    Procedure Summary       Date: 07/11/24 Room / Location: [facility] MA[facility] OR 04 CARDIAC / [facility] MA[facility] OR    Anesthesia Start: 0656 Anesthesia Stop: 1256    Procedures:       AORTIC VALVE REPLACEMENT; MITRAL VALVE REPLACEMENT (Chest)      TRANSESOPHAGEAL ECHOCARDIOGRAM (Esophagus) Diagnosis:       Aortic valve stenosis      Mitral regurgitation      Shortness of breath      (Aortic valve stenosis [I35.0])      (Mitral regurgitation [I34.0])      (Shortness of breath [R06.02])    Providers: Rambo Ellis MD Responsible Provider: Art Boland MD    Anesthesia Type: general ASA Status: 4            Anesthesia Type: No value filed.    Ashley Phase I: Ashley Score: 10    Ashley Phase II:      Anesthesia Post Evaluation    Patient location during evaluation: ICU  Patient participation: complete - patient cannot participate  Level of consciousness: sedated and ventilated  Airway patency: patent  Cardiovascular status: hemodynamically stable  Respiratory status: intubated and ventilator  Hydration status: euvolemic  Comments: /62   Pulse 61   Temp 98.2 °F (36.8 °C)   Resp 18   Ht 1.626 m (5' 4\")   Wt 67 kg (147 lb 9.6 oz)   SpO2 100%   BMI 25.34 kg/m²       No notable events documented.

## 2024-07-12 ENCOUNTER — APPOINTMENT (OUTPATIENT)
Dept: GENERAL RADIOLOGY | Age: 79
DRG: 212 | End: 2024-07-12
Payer: MEDICARE

## 2024-07-12 PROBLEM — Z95.2 S/P AVR: Status: ACTIVE | Noted: 2024-07-12

## 2024-07-12 LAB
ANION GAP SERPL CALC-SCNC: 9 MMOL/L (ref 9–18)
ARTERIAL PATENCY WRIST A: NORMAL
BASE EXCESS BLD CALC-SCNC: 0 MMOL/L
BDY SITE: NORMAL
BLD PROD TYP BPU: NORMAL
BLOOD BANK BLOOD PRODUCT EXPIRATION DATE: NORMAL
BLOOD BANK DISPENSE STATUS: NORMAL
BLOOD BANK ISBT PRODUCT BLOOD TYPE: 6200
BLOOD BANK PRODUCT CODE: NORMAL
BLOOD BANK UNIT TYPE AND RH: NORMAL
BPU ID: NORMAL
BUN SERPL-MCNC: 10 MG/DL (ref 8–23)
CALCIUM SERPL-MCNC: 8.2 MG/DL (ref 8.8–10.2)
CHLORIDE SERPL-SCNC: 110 MMOL/L (ref 98–107)
CO2 SERPL-SCNC: 24 MMOL/L (ref 20–28)
CREAT SERPL-MCNC: 0.69 MG/DL (ref 0.6–1.1)
ERYTHROCYTE [DISTWIDTH] IN BLOOD BY AUTOMATED COUNT: 13.4 % (ref 11.9–14.6)
GAS FLOW.O2 O2 DELIVERY SYS: NORMAL
GLUCOSE BLD STRIP.AUTO-MCNC: 109 MG/DL (ref 65–100)
GLUCOSE BLD STRIP.AUTO-MCNC: 109 MG/DL (ref 65–100)
GLUCOSE BLD STRIP.AUTO-MCNC: 112 MG/DL (ref 65–100)
GLUCOSE BLD STRIP.AUTO-MCNC: 114 MG/DL (ref 65–100)
GLUCOSE BLD STRIP.AUTO-MCNC: 114 MG/DL (ref 65–100)
GLUCOSE BLD STRIP.AUTO-MCNC: 130 MG/DL (ref 65–100)
GLUCOSE BLD STRIP.AUTO-MCNC: 148 MG/DL (ref 65–100)
GLUCOSE BLD STRIP.AUTO-MCNC: 152 MG/DL (ref 65–100)
GLUCOSE SERPL-MCNC: 112 MG/DL (ref 70–99)
HCO3 BLD-SCNC: 25.2 MMOL/L (ref 22–26)
HCT VFR BLD AUTO: 29.4 % (ref 35.8–46.3)
HEPARIN REQUIRED-PATIENT: 0
HEPARIN REQUIRED-TOTAL: 0 UNITS
HEPARIN TEST CONCENTRATE: 3.5 MG/KG
HGB BLD-MCNC: 9.4 G/DL (ref 11.7–15.4)
IPAP/PIP/HIGH PEEP: 18
MAGNESIUM SERPL-MCNC: 2.1 MG/DL (ref 1.8–2.4)
MCH RBC QN AUTO: 29.1 PG (ref 26.1–32.9)
MCHC RBC AUTO-ENTMCNC: 32 G/DL (ref 31.4–35)
MCV RBC AUTO: 91 FL (ref 82–102)
NRBC # BLD: 0 K/UL (ref 0–0.2)
O2/TOTAL GAS SETTING VFR VENT: 30 %
PCO2 BLD: 42.8 MMHG (ref 35–45)
PEEP RESPIRATORY: 8 CMH2O
PH BLD: 7.38 (ref 7.35–7.45)
PLATELET # BLD AUTO: 76 K/UL (ref 150–450)
PMV BLD AUTO: 10.4 FL (ref 9.4–12.3)
PO2 BLD: 87 MMHG (ref 75–100)
POTASSIUM SERPL-SCNC: 4.3 MMOL/L (ref 3.5–5.1)
PROTAMINE DOSE-PUMP: 52 MG
PROTAMINE DOSE-TOTAL: 258 MG
RBC # BLD AUTO: 3.23 M/UL (ref 4.05–5.2)
SAO2 % BLD: 96.4 % (ref 95–98)
SERVICE CMNT-IMP: ABNORMAL
SERVICE CMNT-IMP: NORMAL
SODIUM SERPL-SCNC: 142 MMOL/L (ref 136–145)
SPECIMEN TYPE: NORMAL
UNIT DIVISION: 0
UNIT ISSUE DATE/TIME: NORMAL
VENTILATION MODE VENT: NORMAL
VT SETTING VENT: 363 ML
WBC # BLD AUTO: 11.4 K/UL (ref 4.3–11.1)

## 2024-07-12 PROCEDURE — 99231 SBSQ HOSP IP/OBS SF/LOW 25: CPT | Performed by: INTERNAL MEDICINE

## 2024-07-12 PROCEDURE — 2580000003 HC RX 258: Performed by: PHYSICIAN ASSISTANT

## 2024-07-12 PROCEDURE — 99233 SBSQ HOSP IP/OBS HIGH 50: CPT | Performed by: INTERNAL MEDICINE

## 2024-07-12 PROCEDURE — 6370000000 HC RX 637 (ALT 250 FOR IP): Performed by: INTERNAL MEDICINE

## 2024-07-12 PROCEDURE — 6370000000 HC RX 637 (ALT 250 FOR IP): Performed by: NURSE PRACTITIONER

## 2024-07-12 PROCEDURE — 80048 BASIC METABOLIC PNL TOTAL CA: CPT

## 2024-07-12 PROCEDURE — 6360000002 HC RX W HCPCS: Performed by: THORACIC SURGERY (CARDIOTHORACIC VASCULAR SURGERY)

## 2024-07-12 PROCEDURE — 83735 ASSAY OF MAGNESIUM: CPT

## 2024-07-12 PROCEDURE — 71045 X-RAY EXAM CHEST 1 VIEW: CPT

## 2024-07-12 PROCEDURE — 94003 VENT MGMT INPAT SUBQ DAY: CPT

## 2024-07-12 PROCEDURE — 82962 GLUCOSE BLOOD TEST: CPT

## 2024-07-12 PROCEDURE — 2100000001 HC CVICU R&B

## 2024-07-12 PROCEDURE — 2580000003 HC RX 258: Performed by: THORACIC SURGERY (CARDIOTHORACIC VASCULAR SURGERY)

## 2024-07-12 PROCEDURE — 6370000000 HC RX 637 (ALT 250 FOR IP): Performed by: PHYSICIAN ASSISTANT

## 2024-07-12 PROCEDURE — 6360000002 HC RX W HCPCS: Performed by: INTERNAL MEDICINE

## 2024-07-12 PROCEDURE — 2700000000 HC OXYGEN THERAPY PER DAY

## 2024-07-12 PROCEDURE — 85027 COMPLETE CBC AUTOMATED: CPT

## 2024-07-12 PROCEDURE — 6370000000 HC RX 637 (ALT 250 FOR IP): Performed by: THORACIC SURGERY (CARDIOTHORACIC VASCULAR SURGERY)

## 2024-07-12 PROCEDURE — 2500000003 HC RX 250 WO HCPCS: Performed by: PHYSICIAN ASSISTANT

## 2024-07-12 PROCEDURE — 37799 UNLISTED PX VASCULAR SURGERY: CPT

## 2024-07-12 PROCEDURE — 6360000002 HC RX W HCPCS: Performed by: PHYSICIAN ASSISTANT

## 2024-07-12 PROCEDURE — 94761 N-INVAS EAR/PLS OXIMETRY MLT: CPT

## 2024-07-12 RX ORDER — DEXTROSE MONOHYDRATE 100 MG/ML
INJECTION, SOLUTION INTRAVENOUS CONTINUOUS PRN
Status: DISCONTINUED | OUTPATIENT
Start: 2024-07-12 | End: 2024-07-15

## 2024-07-12 RX ORDER — FUROSEMIDE 10 MG/ML
20 INJECTION INTRAMUSCULAR; INTRAVENOUS 2 TIMES DAILY
Status: DISCONTINUED | OUTPATIENT
Start: 2024-07-12 | End: 2024-07-14

## 2024-07-12 RX ORDER — FUROSEMIDE 10 MG/ML
20 INJECTION INTRAMUSCULAR; INTRAVENOUS ONCE
Status: DISCONTINUED | OUTPATIENT
Start: 2024-07-12 | End: 2024-07-12

## 2024-07-12 RX ORDER — TRAMADOL HYDROCHLORIDE 50 MG/1
50 TABLET ORAL EVERY 6 HOURS PRN
Status: DISCONTINUED | OUTPATIENT
Start: 2024-07-12 | End: 2024-07-15

## 2024-07-12 RX ORDER — IBUPROFEN 600 MG/1
1 TABLET ORAL PRN
Status: DISCONTINUED | OUTPATIENT
Start: 2024-07-12 | End: 2024-07-15

## 2024-07-12 RX ADMIN — LEVOTHYROXINE SODIUM 75 MCG: 0.07 TABLET ORAL at 06:07

## 2024-07-12 RX ADMIN — INSULIN LISPRO 1 UNITS: 100 INJECTION, SOLUTION INTRAVENOUS; SUBCUTANEOUS at 21:59

## 2024-07-12 RX ADMIN — Medication 1 AMPULE: at 10:02

## 2024-07-12 RX ADMIN — OXYCODONE HYDROCHLORIDE AND ACETAMINOPHEN 1 TABLET: 5; 325 TABLET ORAL at 21:36

## 2024-07-12 RX ADMIN — OXYCODONE HYDROCHLORIDE AND ACETAMINOPHEN 1 TABLET: 5; 325 TABLET ORAL at 17:01

## 2024-07-12 RX ADMIN — FAMOTIDINE 20 MG: 10 INJECTION, SOLUTION INTRAVENOUS at 21:39

## 2024-07-12 RX ADMIN — CEFAZOLIN 2000 MG: 10 INJECTION, POWDER, FOR SOLUTION INTRAVENOUS at 14:15

## 2024-07-12 RX ADMIN — Medication 1 AMPULE: at 21:49

## 2024-07-12 RX ADMIN — OXYCODONE HYDROCHLORIDE AND ACETAMINOPHEN 1 TABLET: 5; 325 TABLET ORAL at 05:42

## 2024-07-12 RX ADMIN — FUROSEMIDE 20 MG: 10 INJECTION, SOLUTION INTRAMUSCULAR; INTRAVENOUS at 10:02

## 2024-07-12 RX ADMIN — OXYCODONE HYDROCHLORIDE AND ACETAMINOPHEN 1 TABLET: 5; 325 TABLET ORAL at 01:28

## 2024-07-12 RX ADMIN — INSULIN LISPRO 2 UNITS: 100 INJECTION, SOLUTION INTRAVENOUS; SUBCUTANEOUS at 17:07

## 2024-07-12 RX ADMIN — SODIUM CHLORIDE, PRESERVATIVE FREE 10 ML: 5 INJECTION INTRAVENOUS at 09:00

## 2024-07-12 RX ADMIN — TRAZODONE HYDROCHLORIDE 150 MG: 50 TABLET ORAL at 22:03

## 2024-07-12 RX ADMIN — FUROSEMIDE 20 MG: 10 INJECTION, SOLUTION INTRAMUSCULAR; INTRAVENOUS at 17:02

## 2024-07-12 RX ADMIN — SODIUM CHLORIDE, PRESERVATIVE FREE 10 ML: 5 INJECTION INTRAVENOUS at 22:01

## 2024-07-12 RX ADMIN — ROSUVASTATIN CALCIUM 20 MG: 20 TABLET, FILM COATED ORAL at 21:37

## 2024-07-12 RX ADMIN — SODIUM CHLORIDE 5 MG/HR: 9 INJECTION, SOLUTION INTRAVENOUS at 01:49

## 2024-07-12 RX ADMIN — SODIUM CHLORIDE 7.5 MG/HR: 9 INJECTION, SOLUTION INTRAVENOUS at 06:07

## 2024-07-12 RX ADMIN — FAMOTIDINE 20 MG: 20 TABLET, FILM COATED ORAL at 10:02

## 2024-07-12 RX ADMIN — CEFAZOLIN 2000 MG: 10 INJECTION, POWDER, FOR SOLUTION INTRAVENOUS at 21:40

## 2024-07-12 RX ADMIN — TRAMADOL HYDROCHLORIDE 50 MG: 50 TABLET ORAL at 20:29

## 2024-07-12 RX ADMIN — CEFAZOLIN 2000 MG: 10 INJECTION, POWDER, FOR SOLUTION INTRAVENOUS at 04:47

## 2024-07-12 RX ADMIN — TRAMADOL HYDROCHLORIDE 50 MG: 50 TABLET ORAL at 11:15

## 2024-07-12 NOTE — INTERDISCIPLINARY ROUNDS
Multi-D Rounds/Checklist (leapfrog):  Lines: can any be removed?: Endotracheal tube - possibly remove other lines later today  ETT  (Active)     Chest Tube Right;Other (Comment) Mediastinal 1 (Active)       Chest Tube Left Mediastinal 2 (Active)       Negative Pressure Wound Therapy Sternum Anterior (Active)       Urinary Catheter 07/11/24 2 Way;Gross-Temperature (Active)     Introducer 07/11/24 (Active)       Arterial Line 07/11/24 Radial (Active)       CVC  07/11/24 Right Internal jugular (Active)     DVT Prophylaxis: Ordered  Vent: N/A  Nutrition Ordered/appropriate: Ordered  Can antibiotics or other drugs be stopped? No /End Date set yes  Inpat Anti-Infectives (From admission, onward)       Start     Ordered Stop    07/11/24 1315  ceFAZolin (ANCEF) 2000 mg in sterile water 20 mL IV syringe  2,000 mg,   IntraVENous,   EVERY 8 HOURS        Note to Pharmacy: Default Settings:Auto-dosed by Weight Q8h x 2 doses  Auto-dosed: Pt Wt<119.9kg-2gm, >120kg-3gm    07/11/24 1254 07/13/24 0514                  Consults needed: None  A: Is pain control adequate? (has PRNs? Stop drip?) Yes  B: Sedation break and SBT? N/A  C: Is sedation choice appropriate? N/A  D: Delirium/CAM-ICU? No  E: Mobility goals/appropriateness? Yes  F: Family update and plan?  is surrogate decision maker and is being updated daily by primary attending and nursing staff.    Marleny Figueroa, APRN - CNP

## 2024-07-13 ENCOUNTER — APPOINTMENT (OUTPATIENT)
Dept: GENERAL RADIOLOGY | Age: 79
DRG: 212 | End: 2024-07-13
Payer: MEDICARE

## 2024-07-13 PROBLEM — Z98.890 S/P MVR (MITRAL VALVE REPAIR): Status: ACTIVE | Noted: 2024-07-13

## 2024-07-13 PROBLEM — Z95.5 HX OF HEART ARTERY STENT: Status: ACTIVE | Noted: 2024-07-13

## 2024-07-13 LAB
ANION GAP SERPL CALC-SCNC: 11 MMOL/L (ref 9–18)
BUN SERPL-MCNC: 9 MG/DL (ref 8–23)
CALCIUM SERPL-MCNC: 8.2 MG/DL (ref 8.8–10.2)
CHLORIDE SERPL-SCNC: 102 MMOL/L (ref 98–107)
CO2 SERPL-SCNC: 24 MMOL/L (ref 20–28)
CREAT SERPL-MCNC: 0.74 MG/DL (ref 0.6–1.1)
EKG ATRIAL RATE: 59 BPM
EKG DIAGNOSIS: NORMAL
EKG DIAGNOSIS: NORMAL
EKG P AXIS: 22 DEGREES
EKG P-R INTERVAL: 212 MS
EKG Q-T INTERVAL: 442 MS
EKG Q-T INTERVAL: 526 MS
EKG QRS DURATION: 114 MS
EKG QRS DURATION: 120 MS
EKG QTC CALCULATION (BAZETT): 509 MS
EKG QTC CALCULATION (BAZETT): 520 MS
EKG R AXIS: 43 DEGREES
EKG R AXIS: 51 DEGREES
EKG T AXIS: -79 DEGREES
EKG T AXIS: 258 DEGREES
EKG VENTRICULAR RATE: 59 BPM
EKG VENTRICULAR RATE: 80 BPM
ERYTHROCYTE [DISTWIDTH] IN BLOOD BY AUTOMATED COUNT: 13.6 % (ref 11.9–14.6)
GLUCOSE BLD STRIP.AUTO-MCNC: 116 MG/DL (ref 65–100)
GLUCOSE BLD STRIP.AUTO-MCNC: 119 MG/DL (ref 65–100)
GLUCOSE BLD STRIP.AUTO-MCNC: 150 MG/DL (ref 65–100)
GLUCOSE BLD STRIP.AUTO-MCNC: 165 MG/DL (ref 65–100)
GLUCOSE SERPL-MCNC: 129 MG/DL (ref 70–99)
HCT VFR BLD AUTO: 28.4 % (ref 35.8–46.3)
HGB BLD-MCNC: 9.2 G/DL (ref 11.7–15.4)
MAGNESIUM SERPL-MCNC: 1.9 MG/DL (ref 1.8–2.4)
MAGNESIUM SERPL-MCNC: 2 MG/DL (ref 1.8–2.4)
MAGNESIUM SERPL-MCNC: 2.2 MG/DL (ref 1.8–2.4)
MCH RBC QN AUTO: 29.7 PG (ref 26.1–32.9)
MCHC RBC AUTO-ENTMCNC: 32.4 G/DL (ref 31.4–35)
MCV RBC AUTO: 91.6 FL (ref 82–102)
MM INDURATION, POC: 0 MM (ref 0–5)
NRBC # BLD: 0 K/UL (ref 0–0.2)
PLATELET # BLD AUTO: 71 K/UL (ref 150–450)
PMV BLD AUTO: 11.8 FL (ref 9.4–12.3)
POTASSIUM SERPL-SCNC: 3.3 MMOL/L (ref 3.5–5.1)
POTASSIUM SERPL-SCNC: 3.6 MMOL/L (ref 3.5–5.1)
POTASSIUM SERPL-SCNC: 4.9 MMOL/L (ref 3.5–5.1)
PPD, POC: NEGATIVE
RBC # BLD AUTO: 3.1 M/UL (ref 4.05–5.2)
SERVICE CMNT-IMP: ABNORMAL
SODIUM SERPL-SCNC: 137 MMOL/L (ref 136–145)
WBC # BLD AUTO: 17.8 K/UL (ref 4.3–11.1)

## 2024-07-13 PROCEDURE — 84132 ASSAY OF SERUM POTASSIUM: CPT

## 2024-07-13 PROCEDURE — 36592 COLLECT BLOOD FROM PICC: CPT

## 2024-07-13 PROCEDURE — 97530 THERAPEUTIC ACTIVITIES: CPT

## 2024-07-13 PROCEDURE — 97164 PT RE-EVAL EST PLAN CARE: CPT

## 2024-07-13 PROCEDURE — 93010 ELECTROCARDIOGRAM REPORT: CPT | Performed by: INTERNAL MEDICINE

## 2024-07-13 PROCEDURE — 99232 SBSQ HOSP IP/OBS MODERATE 35: CPT | Performed by: INTERNAL MEDICINE

## 2024-07-13 PROCEDURE — 6370000000 HC RX 637 (ALT 250 FOR IP): Performed by: THORACIC SURGERY (CARDIOTHORACIC VASCULAR SURGERY)

## 2024-07-13 PROCEDURE — 2100000001 HC CVICU R&B

## 2024-07-13 PROCEDURE — 6370000000 HC RX 637 (ALT 250 FOR IP): Performed by: NURSE PRACTITIONER

## 2024-07-13 PROCEDURE — 93005 ELECTROCARDIOGRAM TRACING: CPT | Performed by: THORACIC SURGERY (CARDIOTHORACIC VASCULAR SURGERY)

## 2024-07-13 PROCEDURE — 2580000003 HC RX 258: Performed by: PHYSICIAN ASSISTANT

## 2024-07-13 PROCEDURE — 83735 ASSAY OF MAGNESIUM: CPT

## 2024-07-13 PROCEDURE — 6360000002 HC RX W HCPCS: Performed by: THORACIC SURGERY (CARDIOTHORACIC VASCULAR SURGERY)

## 2024-07-13 PROCEDURE — 80048 BASIC METABOLIC PNL TOTAL CA: CPT

## 2024-07-13 PROCEDURE — 6360000002 HC RX W HCPCS: Performed by: PHYSICIAN ASSISTANT

## 2024-07-13 PROCEDURE — 6370000000 HC RX 637 (ALT 250 FOR IP): Performed by: INTERNAL MEDICINE

## 2024-07-13 PROCEDURE — 6360000002 HC RX W HCPCS: Performed by: INTERNAL MEDICINE

## 2024-07-13 PROCEDURE — 6370000000 HC RX 637 (ALT 250 FOR IP): Performed by: PHYSICIAN ASSISTANT

## 2024-07-13 PROCEDURE — 82962 GLUCOSE BLOOD TEST: CPT

## 2024-07-13 PROCEDURE — 85027 COMPLETE CBC AUTOMATED: CPT

## 2024-07-13 PROCEDURE — 71045 X-RAY EXAM CHEST 1 VIEW: CPT

## 2024-07-13 PROCEDURE — 2580000003 HC RX 258: Performed by: THORACIC SURGERY (CARDIOTHORACIC VASCULAR SURGERY)

## 2024-07-13 RX ORDER — AMLODIPINE BESYLATE 5 MG/1
5 TABLET ORAL 2 TIMES DAILY
Status: DISCONTINUED | OUTPATIENT
Start: 2024-07-14 | End: 2024-07-13

## 2024-07-13 RX ORDER — AMLODIPINE BESYLATE 5 MG/1
5 TABLET ORAL 2 TIMES DAILY
Status: DISCONTINUED | OUTPATIENT
Start: 2024-07-13 | End: 2024-07-14

## 2024-07-13 RX ADMIN — TRAZODONE HYDROCHLORIDE 150 MG: 50 TABLET ORAL at 20:33

## 2024-07-13 RX ADMIN — POTASSIUM CHLORIDE 20 MEQ: 400 INJECTION, SOLUTION INTRAVENOUS at 21:43

## 2024-07-13 RX ADMIN — FUROSEMIDE 20 MG: 10 INJECTION, SOLUTION INTRAMUSCULAR; INTRAVENOUS at 17:52

## 2024-07-13 RX ADMIN — SODIUM CHLORIDE, PRESERVATIVE FREE 10 ML: 5 INJECTION INTRAVENOUS at 09:11

## 2024-07-13 RX ADMIN — INSULIN LISPRO 2 UNITS: 100 INJECTION, SOLUTION INTRAVENOUS; SUBCUTANEOUS at 18:00

## 2024-07-13 RX ADMIN — Medication 1 AMPULE: at 20:33

## 2024-07-13 RX ADMIN — POTASSIUM CHLORIDE 20 MEQ: 400 INJECTION, SOLUTION INTRAVENOUS at 04:36

## 2024-07-13 RX ADMIN — POTASSIUM CHLORIDE 20 MEQ: 400 INJECTION, SOLUTION INTRAVENOUS at 22:23

## 2024-07-13 RX ADMIN — OXYCODONE HYDROCHLORIDE AND ACETAMINOPHEN 1 TABLET: 5; 325 TABLET ORAL at 03:32

## 2024-07-13 RX ADMIN — AMIODARONE HYDROCHLORIDE 150 MG: 50 INJECTION, SOLUTION INTRAVENOUS at 17:47

## 2024-07-13 RX ADMIN — ACETAMINOPHEN 650 MG: 325 TABLET ORAL at 20:33

## 2024-07-13 RX ADMIN — TRAMADOL HYDROCHLORIDE 50 MG: 50 TABLET ORAL at 09:07

## 2024-07-13 RX ADMIN — ACETAMINOPHEN 650 MG: 325 TABLET ORAL at 15:13

## 2024-07-13 RX ADMIN — FUROSEMIDE 20 MG: 10 INJECTION, SOLUTION INTRAMUSCULAR; INTRAVENOUS at 09:10

## 2024-07-13 RX ADMIN — FAMOTIDINE 20 MG: 20 TABLET, FILM COATED ORAL at 09:07

## 2024-07-13 RX ADMIN — ACETAMINOPHEN 650 MG: 325 TABLET ORAL at 09:13

## 2024-07-13 RX ADMIN — Medication 1 AMPULE: at 09:12

## 2024-07-13 RX ADMIN — FAMOTIDINE 20 MG: 20 TABLET, FILM COATED ORAL at 20:33

## 2024-07-13 RX ADMIN — ROSUVASTATIN CALCIUM 20 MG: 20 TABLET, FILM COATED ORAL at 20:34

## 2024-07-13 RX ADMIN — SODIUM CHLORIDE, PRESERVATIVE FREE 10 ML: 5 INJECTION INTRAVENOUS at 20:46

## 2024-07-13 RX ADMIN — AMIODARONE HYDROCHLORIDE 1 MG/MIN: 50 INJECTION, SOLUTION INTRAVENOUS at 17:58

## 2024-07-13 RX ADMIN — LEVOTHYROXINE SODIUM 75 MCG: 0.07 TABLET ORAL at 09:13

## 2024-07-13 RX ADMIN — MAGNESIUM SULFATE HEPTAHYDRATE 1000 MG: 1 INJECTION, SOLUTION INTRAVENOUS at 03:29

## 2024-07-13 RX ADMIN — AMLODIPINE BESYLATE 5 MG: 5 TABLET ORAL at 22:46

## 2024-07-13 RX ADMIN — AMIODARONE HYDROCHLORIDE 200 MG: 200 TABLET ORAL at 09:07

## 2024-07-13 RX ADMIN — INSULIN LISPRO 1 UNITS: 100 INJECTION, SOLUTION INTRAVENOUS; SUBCUTANEOUS at 20:33

## 2024-07-13 RX ADMIN — AMIODARONE HYDROCHLORIDE 0.5 MG/MIN: 50 INJECTION, SOLUTION INTRAVENOUS at 23:38

## 2024-07-14 ENCOUNTER — APPOINTMENT (OUTPATIENT)
Dept: NON INVASIVE DIAGNOSTICS | Age: 79
DRG: 212 | End: 2024-07-14
Attending: INTERNAL MEDICINE
Payer: MEDICARE

## 2024-07-14 ENCOUNTER — APPOINTMENT (OUTPATIENT)
Dept: GENERAL RADIOLOGY | Age: 79
DRG: 212 | End: 2024-07-14
Payer: MEDICARE

## 2024-07-14 LAB
ABO + RH BLD: NORMAL
ANION GAP SERPL CALC-SCNC: 8 MMOL/L (ref 9–18)
ANION GAP SERPL CALC-SCNC: 9 MMOL/L (ref 9–18)
BLD PROD TYP BPU: NORMAL
BLOOD BANK BLOOD PRODUCT EXPIRATION DATE: NORMAL
BLOOD BANK BLOOD PRODUCT EXPIRATION DATE: NORMAL
BLOOD BANK DISPENSE STATUS: NORMAL
BLOOD BANK ISBT PRODUCT BLOOD TYPE: 6200
BLOOD BANK ISBT PRODUCT BLOOD TYPE: 6200
BLOOD BANK UNIT TYPE AND RH: NORMAL
BLOOD BANK UNIT TYPE AND RH: NORMAL
BLOOD GROUP ANTIBODIES SERPL: NORMAL
BPU ID: NORMAL
BUN SERPL-MCNC: 7 MG/DL (ref 8–23)
BUN SERPL-MCNC: 8 MG/DL (ref 8–23)
CA-I BLD-MCNC: 4.38 MG/DL (ref 4–5.2)
CALCIUM SERPL-MCNC: 8.4 MG/DL (ref 8.8–10.2)
CALCIUM SERPL-MCNC: 8.5 MG/DL (ref 8.8–10.2)
CHLORIDE SERPL-SCNC: 102 MMOL/L (ref 98–107)
CHLORIDE SERPL-SCNC: 105 MMOL/L (ref 98–107)
CO2 SERPL-SCNC: 26 MMOL/L (ref 20–28)
CO2 SERPL-SCNC: 26 MMOL/L (ref 20–28)
CREAT SERPL-MCNC: 0.54 MG/DL (ref 0.6–1.1)
CREAT SERPL-MCNC: 0.58 MG/DL (ref 0.6–1.1)
CROSSMATCH RESULT: NORMAL
ECHO AV ACCELERATION TIME: 81 MS
ECHO AV AREA PEAK VELOCITY: 1.3 CM2
ECHO AV AREA VTI: 1.3 CM2
ECHO AV AREA/BSA PEAK VELOCITY: 0.7 CM2/M2
ECHO AV AREA/BSA VTI: 0.7 CM2/M2
ECHO AV MEAN GRADIENT: 13 MMHG
ECHO AV MEAN VELOCITY: 1.7 M/S
ECHO AV PEAK GRADIENT: 23 MMHG
ECHO AV PEAK VELOCITY: 2.4 M/S
ECHO AV VELOCITY RATIO: 0.5
ECHO AV VTI: 35.3 CM
ECHO BSA: 1.77 M2
ECHO LV EDV A2C: 93 ML
ECHO LV EDV A4C: 115 ML
ECHO LV EDV INDEX A4C: 63 ML/M2
ECHO LV EDV NDEX A2C: 51 ML/M2
ECHO LV EJECTION FRACTION A2C: 17 %
ECHO LV EJECTION FRACTION A4C: 25 %
ECHO LV EJECTION FRACTION BIPLANE: 24 % (ref 55–100)
ECHO LV ESV A2C: 78 ML
ECHO LV ESV A4C: 86 ML
ECHO LV ESV INDEX A2C: 42 ML/M2
ECHO LV ESV INDEX A4C: 47 ML/M2
ECHO LV FRACTIONAL SHORTENING: 15 % (ref 28–44)
ECHO LV INTERNAL DIMENSION DIASTOLE INDEX: 2.5 CM/M2
ECHO LV INTERNAL DIMENSION DIASTOLIC: 4.6 CM (ref 3.9–5.3)
ECHO LV INTERNAL DIMENSION SYSTOLIC INDEX: 2.12 CM/M2
ECHO LV INTERNAL DIMENSION SYSTOLIC: 3.9 CM
ECHO LV IVSD: 0.9 CM (ref 0.6–0.9)
ECHO LV MASS 2D: 127.7 G (ref 67–162)
ECHO LV MASS INDEX 2D: 69.4 G/M2 (ref 43–95)
ECHO LV POSTERIOR WALL DIASTOLIC: 0.8 CM (ref 0.6–0.9)
ECHO LV RELATIVE WALL THICKNESS RATIO: 0.35
ECHO LVOT AREA: 2.5 CM2
ECHO LVOT AV VTI INDEX: 0.52
ECHO LVOT DIAM: 1.8 CM
ECHO LVOT MEAN GRADIENT: 3 MMHG
ECHO LVOT PEAK GRADIENT: 6 MMHG
ECHO LVOT PEAK VELOCITY: 1.2 M/S
ECHO LVOT STROKE VOLUME INDEX: 25.6 ML/M2
ECHO LVOT SV: 47.1 ML
ECHO LVOT VTI: 18.5 CM
ECHO MV AREA VTI: 1 CM2
ECHO MV LVOT VTI INDEX: 2.43
ECHO MV MAX VELOCITY: 1.8 M/S
ECHO MV MEAN GRADIENT: 9 MMHG
ECHO MV MEAN VELOCITY: 1.4 M/S
ECHO MV PEAK GRADIENT: 13 MMHG
ECHO MV VTI: 45 CM
ECHO RV INTERNAL DIMENSION: 3.8 CM
EKG DIAGNOSIS: NORMAL
EKG DIAGNOSIS: NORMAL
EKG Q-T INTERVAL: 384 MS
EKG Q-T INTERVAL: 510 MS
EKG QRS DURATION: 108 MS
EKG QRS DURATION: 112 MS
EKG QTC CALCULATION (BAZETT): 389 MS
EKG QTC CALCULATION (BAZETT): 504 MS
EKG R AXIS: 53 DEGREES
EKG R AXIS: 82 DEGREES
EKG T AXIS: -47 DEGREES
EKG T AXIS: 267 DEGREES
EKG VENTRICULAR RATE: 59 BPM
EKG VENTRICULAR RATE: 62 BPM
ERYTHROCYTE [DISTWIDTH] IN BLOOD BY AUTOMATED COUNT: 13.4 % (ref 11.9–14.6)
GLUCOSE BLD STRIP.AUTO-MCNC: 118 MG/DL (ref 65–100)
GLUCOSE BLD STRIP.AUTO-MCNC: 119 MG/DL (ref 65–100)
GLUCOSE BLD STRIP.AUTO-MCNC: 123 MG/DL (ref 65–100)
GLUCOSE BLD STRIP.AUTO-MCNC: 130 MG/DL (ref 65–100)
GLUCOSE BLD STRIP.AUTO-MCNC: 132 MG/DL (ref 65–100)
GLUCOSE SERPL-MCNC: 108 MG/DL (ref 70–99)
GLUCOSE SERPL-MCNC: 125 MG/DL (ref 70–99)
HCT VFR BLD AUTO: 30.6 % (ref 35.8–46.3)
HGB BLD-MCNC: 9.8 G/DL (ref 11.7–15.4)
MAGNESIUM SERPL-MCNC: 2.1 MG/DL (ref 1.8–2.4)
MAGNESIUM SERPL-MCNC: 2.2 MG/DL (ref 1.8–2.4)
MCH RBC QN AUTO: 29.5 PG (ref 26.1–32.9)
MCHC RBC AUTO-ENTMCNC: 32 G/DL (ref 31.4–35)
MCV RBC AUTO: 92.2 FL (ref 82–102)
NRBC # BLD: 0 K/UL (ref 0–0.2)
PLATELET # BLD AUTO: 92 K/UL (ref 150–450)
PMV BLD AUTO: 11.9 FL (ref 9.4–12.3)
POTASSIUM SERPL-SCNC: 3.2 MMOL/L (ref 3.5–5.1)
POTASSIUM SERPL-SCNC: 4.4 MMOL/L (ref 3.5–5.1)
POTASSIUM SERPL-SCNC: 4.6 MMOL/L (ref 3.5–5.1)
RBC # BLD AUTO: 3.32 M/UL (ref 4.05–5.2)
SERVICE CMNT-IMP: ABNORMAL
SODIUM SERPL-SCNC: 136 MMOL/L (ref 136–145)
SODIUM SERPL-SCNC: 140 MMOL/L (ref 136–145)
SPECIMEN EXP DATE BLD: NORMAL
UNIT DIVISION: 0
UNIT ISSUE DATE/TIME: NORMAL
UNIT ISSUE DATE/TIME: NORMAL
WBC # BLD AUTO: 19.6 K/UL (ref 4.3–11.1)

## 2024-07-14 PROCEDURE — 93325 DOPPLER ECHO COLOR FLOW MAPG: CPT | Performed by: INTERNAL MEDICINE

## 2024-07-14 PROCEDURE — 2100000001 HC CVICU R&B

## 2024-07-14 PROCEDURE — 82962 GLUCOSE BLOOD TEST: CPT

## 2024-07-14 PROCEDURE — 36592 COLLECT BLOOD FROM PICC: CPT

## 2024-07-14 PROCEDURE — 80048 BASIC METABOLIC PNL TOTAL CA: CPT

## 2024-07-14 PROCEDURE — 99232 SBSQ HOSP IP/OBS MODERATE 35: CPT | Performed by: INTERNAL MEDICINE

## 2024-07-14 PROCEDURE — 2500000003 HC RX 250 WO HCPCS: Performed by: PHYSICIAN ASSISTANT

## 2024-07-14 PROCEDURE — 6370000000 HC RX 637 (ALT 250 FOR IP): Performed by: INTERNAL MEDICINE

## 2024-07-14 PROCEDURE — 6360000002 HC RX W HCPCS: Performed by: INTERNAL MEDICINE

## 2024-07-14 PROCEDURE — 2580000003 HC RX 258: Performed by: THORACIC SURGERY (CARDIOTHORACIC VASCULAR SURGERY)

## 2024-07-14 PROCEDURE — 93308 TTE F-UP OR LMTD: CPT | Performed by: INTERNAL MEDICINE

## 2024-07-14 PROCEDURE — 83735 ASSAY OF MAGNESIUM: CPT

## 2024-07-14 PROCEDURE — 6370000000 HC RX 637 (ALT 250 FOR IP): Performed by: NURSE PRACTITIONER

## 2024-07-14 PROCEDURE — 93005 ELECTROCARDIOGRAM TRACING: CPT | Performed by: THORACIC SURGERY (CARDIOTHORACIC VASCULAR SURGERY)

## 2024-07-14 PROCEDURE — 6360000002 HC RX W HCPCS: Performed by: PHYSICIAN ASSISTANT

## 2024-07-14 PROCEDURE — 71045 X-RAY EXAM CHEST 1 VIEW: CPT

## 2024-07-14 PROCEDURE — 99291 CRITICAL CARE FIRST HOUR: CPT | Performed by: INTERNAL MEDICINE

## 2024-07-14 PROCEDURE — 84132 ASSAY OF SERUM POTASSIUM: CPT

## 2024-07-14 PROCEDURE — 93321 DOPPLER ECHO F-UP/LMTD STD: CPT | Performed by: INTERNAL MEDICINE

## 2024-07-14 PROCEDURE — C8924 2D TTE W OR W/O FOL W/CON,FU: HCPCS

## 2024-07-14 PROCEDURE — 85027 COMPLETE CBC AUTOMATED: CPT

## 2024-07-14 PROCEDURE — 93005 ELECTROCARDIOGRAM TRACING: CPT | Performed by: CASE MANAGER/CARE COORDINATOR

## 2024-07-14 PROCEDURE — 6360000004 HC RX CONTRAST MEDICATION: Performed by: THORACIC SURGERY (CARDIOTHORACIC VASCULAR SURGERY)

## 2024-07-14 PROCEDURE — 93010 ELECTROCARDIOGRAM REPORT: CPT | Performed by: INTERNAL MEDICINE

## 2024-07-14 PROCEDURE — 2580000003 HC RX 258: Performed by: PHYSICIAN ASSISTANT

## 2024-07-14 PROCEDURE — 6370000000 HC RX 637 (ALT 250 FOR IP): Performed by: THORACIC SURGERY (CARDIOTHORACIC VASCULAR SURGERY)

## 2024-07-14 PROCEDURE — 6370000000 HC RX 637 (ALT 250 FOR IP): Performed by: PHYSICIAN ASSISTANT

## 2024-07-14 PROCEDURE — 82330 ASSAY OF CALCIUM: CPT

## 2024-07-14 RX ORDER — CARVEDILOL 6.25 MG/1
6.25 TABLET ORAL 2 TIMES DAILY
Status: DISCONTINUED | OUTPATIENT
Start: 2024-07-14 | End: 2024-07-23 | Stop reason: HOSPADM

## 2024-07-14 RX ORDER — MAGNESIUM SULFATE IN WATER 40 MG/ML
2000 INJECTION, SOLUTION INTRAVENOUS ONCE
Status: DISCONTINUED | OUTPATIENT
Start: 2024-07-14 | End: 2024-07-15

## 2024-07-14 RX ORDER — CALCIUM GLUCONATE 94 MG/ML
1000 INJECTION, SOLUTION INTRAVENOUS ONCE
Status: COMPLETED | OUTPATIENT
Start: 2024-07-14 | End: 2024-07-14

## 2024-07-14 RX ORDER — LIDOCAINE HYDROCHLORIDE ANHYDROUS AND DEXTROSE MONOHYDRATE 5; 400 G/100ML; MG/100ML
1 INJECTION, SOLUTION INTRAVENOUS CONTINUOUS
Status: DISCONTINUED | OUTPATIENT
Start: 2024-07-14 | End: 2024-07-15

## 2024-07-14 RX ORDER — AMLODIPINE BESYLATE 5 MG/1
5 TABLET ORAL 2 TIMES DAILY
Status: DISCONTINUED | OUTPATIENT
Start: 2024-07-14 | End: 2024-07-16

## 2024-07-14 RX ORDER — FUROSEMIDE 10 MG/ML
20 INJECTION INTRAMUSCULAR; INTRAVENOUS DAILY
Status: DISCONTINUED | OUTPATIENT
Start: 2024-07-14 | End: 2024-07-15

## 2024-07-14 RX ORDER — SENNA AND DOCUSATE SODIUM 50; 8.6 MG/1; MG/1
2 TABLET, FILM COATED ORAL 2 TIMES DAILY
Status: DISCONTINUED | OUTPATIENT
Start: 2024-07-14 | End: 2024-07-15

## 2024-07-14 RX ADMIN — SODIUM CHLORIDE, PRESERVATIVE FREE 10 ML: 5 INJECTION INTRAVENOUS at 21:25

## 2024-07-14 RX ADMIN — POTASSIUM CHLORIDE 20 MEQ: 400 INJECTION, SOLUTION INTRAVENOUS at 14:30

## 2024-07-14 RX ADMIN — Medication 1 AMPULE: at 21:25

## 2024-07-14 RX ADMIN — ACETAMINOPHEN 650 MG: 325 TABLET ORAL at 04:07

## 2024-07-14 RX ADMIN — LEVOTHYROXINE SODIUM 75 MCG: 0.07 TABLET ORAL at 06:23

## 2024-07-14 RX ADMIN — OXYCODONE HYDROCHLORIDE AND ACETAMINOPHEN 1 TABLET: 5; 325 TABLET ORAL at 15:09

## 2024-07-14 RX ADMIN — ACETAMINOPHEN 650 MG: 325 TABLET ORAL at 10:54

## 2024-07-14 RX ADMIN — TRAZODONE HYDROCHLORIDE 150 MG: 50 TABLET ORAL at 21:15

## 2024-07-14 RX ADMIN — OXYCODONE HYDROCHLORIDE AND ACETAMINOPHEN 1 TABLET: 5; 325 TABLET ORAL at 21:11

## 2024-07-14 RX ADMIN — SODIUM CHLORIDE, PRESERVATIVE FREE 10 ML: 5 INJECTION INTRAVENOUS at 10:04

## 2024-07-14 RX ADMIN — CARVEDILOL 6.25 MG: 6.25 TABLET, FILM COATED ORAL at 21:11

## 2024-07-14 RX ADMIN — FAMOTIDINE 20 MG: 10 INJECTION, SOLUTION INTRAVENOUS at 21:13

## 2024-07-14 RX ADMIN — CALCIUM GLUCONATE 1000 MG: 98 INJECTION, SOLUTION INTRAVENOUS at 10:06

## 2024-07-14 RX ADMIN — CARVEDILOL 6.25 MG: 6.25 TABLET, FILM COATED ORAL at 10:03

## 2024-07-14 RX ADMIN — FUROSEMIDE 20 MG: 10 INJECTION, SOLUTION INTRAMUSCULAR; INTRAVENOUS at 10:03

## 2024-07-14 RX ADMIN — DOCUSATE SODIUM 50 MG AND SENNOSIDES 8.6 MG 2 TABLET: 8.6; 5 TABLET, FILM COATED ORAL at 10:03

## 2024-07-14 RX ADMIN — PERFLUTREN 0.45 ML: 6.52 INJECTION, SUSPENSION INTRAVENOUS at 12:55

## 2024-07-14 RX ADMIN — FAMOTIDINE 20 MG: 20 TABLET, FILM COATED ORAL at 10:03

## 2024-07-14 RX ADMIN — Medication 1 AMPULE: at 10:04

## 2024-07-14 RX ADMIN — POTASSIUM CHLORIDE 20 MEQ: 400 INJECTION, SOLUTION INTRAVENOUS at 15:37

## 2024-07-14 RX ADMIN — MAGNESIUM SULFATE HEPTAHYDRATE 2000 MG: 40 INJECTION, SOLUTION INTRAVENOUS at 03:04

## 2024-07-14 RX ADMIN — DOCUSATE SODIUM 50 MG AND SENNOSIDES 8.6 MG 2 TABLET: 8.6; 5 TABLET, FILM COATED ORAL at 21:11

## 2024-07-14 RX ADMIN — ROSUVASTATIN CALCIUM 20 MG: 20 TABLET, FILM COATED ORAL at 21:13

## 2024-07-15 ENCOUNTER — APPOINTMENT (OUTPATIENT)
Dept: GENERAL RADIOLOGY | Age: 79
DRG: 212 | End: 2024-07-15
Payer: MEDICARE

## 2024-07-15 LAB
ANION GAP SERPL CALC-SCNC: 10 MMOL/L (ref 9–18)
BUN SERPL-MCNC: 8 MG/DL (ref 8–23)
CALCIUM SERPL-MCNC: 8.3 MG/DL (ref 8.8–10.2)
CHLORIDE SERPL-SCNC: 104 MMOL/L (ref 98–107)
CO2 SERPL-SCNC: 23 MMOL/L (ref 20–28)
CREAT SERPL-MCNC: 0.59 MG/DL (ref 0.6–1.1)
EKG ATRIAL RATE: 62 BPM
EKG DIAGNOSIS: NORMAL
EKG P AXIS: 105 DEGREES
EKG P-R INTERVAL: 186 MS
EKG Q-T INTERVAL: 528 MS
EKG QRS DURATION: 114 MS
EKG QTC CALCULATION (BAZETT): 535 MS
EKG R AXIS: 53 DEGREES
EKG T AXIS: -88 DEGREES
EKG VENTRICULAR RATE: 62 BPM
ERYTHROCYTE [DISTWIDTH] IN BLOOD BY AUTOMATED COUNT: 13.2 % (ref 11.9–14.6)
GLUCOSE BLD STRIP.AUTO-MCNC: 116 MG/DL (ref 65–100)
GLUCOSE BLD STRIP.AUTO-MCNC: 119 MG/DL (ref 65–100)
GLUCOSE BLD STRIP.AUTO-MCNC: 122 MG/DL (ref 65–100)
GLUCOSE BLD STRIP.AUTO-MCNC: 148 MG/DL (ref 65–100)
GLUCOSE SERPL-MCNC: 104 MG/DL (ref 70–99)
HCT VFR BLD AUTO: 30.8 % (ref 35.8–46.3)
HGB BLD-MCNC: 10.2 G/DL (ref 11.7–15.4)
MAGNESIUM SERPL-MCNC: 2 MG/DL (ref 1.8–2.4)
MCH RBC QN AUTO: 29.9 PG (ref 26.1–32.9)
MCHC RBC AUTO-ENTMCNC: 33.1 G/DL (ref 31.4–35)
MCV RBC AUTO: 90.3 FL (ref 82–102)
NRBC # BLD: 0 K/UL (ref 0–0.2)
PLATELET # BLD AUTO: 126 K/UL (ref 150–450)
PMV BLD AUTO: 12 FL (ref 9.4–12.3)
POTASSIUM SERPL-SCNC: 3.7 MMOL/L (ref 3.5–5.1)
PROCALCITONIN SERPL-MCNC: 0.22 NG/ML (ref 0–0.1)
RBC # BLD AUTO: 3.41 M/UL (ref 4.05–5.2)
SERVICE CMNT-IMP: ABNORMAL
SODIUM SERPL-SCNC: 137 MMOL/L (ref 136–145)
WBC # BLD AUTO: 14.3 K/UL (ref 4.3–11.1)

## 2024-07-15 PROCEDURE — 6370000000 HC RX 637 (ALT 250 FOR IP): Performed by: INTERNAL MEDICINE

## 2024-07-15 PROCEDURE — 6370000000 HC RX 637 (ALT 250 FOR IP): Performed by: THORACIC SURGERY (CARDIOTHORACIC VASCULAR SURGERY)

## 2024-07-15 PROCEDURE — 36592 COLLECT BLOOD FROM PICC: CPT

## 2024-07-15 PROCEDURE — 6370000000 HC RX 637 (ALT 250 FOR IP): Performed by: PHYSICIAN ASSISTANT

## 2024-07-15 PROCEDURE — 2580000003 HC RX 258: Performed by: PHYSICIAN ASSISTANT

## 2024-07-15 PROCEDURE — 85027 COMPLETE CBC AUTOMATED: CPT

## 2024-07-15 PROCEDURE — 82962 GLUCOSE BLOOD TEST: CPT

## 2024-07-15 PROCEDURE — 99232 SBSQ HOSP IP/OBS MODERATE 35: CPT | Performed by: INTERNAL MEDICINE

## 2024-07-15 PROCEDURE — 97168 OT RE-EVAL EST PLAN CARE: CPT

## 2024-07-15 PROCEDURE — 71045 X-RAY EXAM CHEST 1 VIEW: CPT

## 2024-07-15 PROCEDURE — 6360000002 HC RX W HCPCS: Performed by: INTERNAL MEDICINE

## 2024-07-15 PROCEDURE — 84145 PROCALCITONIN (PCT): CPT

## 2024-07-15 PROCEDURE — 97535 SELF CARE MNGMENT TRAINING: CPT

## 2024-07-15 PROCEDURE — 93005 ELECTROCARDIOGRAM TRACING: CPT | Performed by: THORACIC SURGERY (CARDIOTHORACIC VASCULAR SURGERY)

## 2024-07-15 PROCEDURE — 93010 ELECTROCARDIOGRAM REPORT: CPT | Performed by: INTERNAL MEDICINE

## 2024-07-15 PROCEDURE — 6360000002 HC RX W HCPCS: Performed by: PHYSICIAN ASSISTANT

## 2024-07-15 PROCEDURE — 80048 BASIC METABOLIC PNL TOTAL CA: CPT

## 2024-07-15 PROCEDURE — 2140000001 HC CVICU INTERMEDIATE R&B

## 2024-07-15 PROCEDURE — 83735 ASSAY OF MAGNESIUM: CPT

## 2024-07-15 RX ORDER — FUROSEMIDE 40 MG/1
40 TABLET ORAL DAILY
Status: COMPLETED | OUTPATIENT
Start: 2024-07-16 | End: 2024-07-18

## 2024-07-15 RX ORDER — INSULIN LISPRO 100 [IU]/ML
0-6 INJECTION, SOLUTION INTRAVENOUS; SUBCUTANEOUS NIGHTLY
Status: DISCONTINUED | OUTPATIENT
Start: 2024-07-15 | End: 2024-07-23

## 2024-07-15 RX ORDER — TRAMADOL HYDROCHLORIDE 50 MG/1
50 TABLET ORAL EVERY 6 HOURS PRN
Status: DISCONTINUED | OUTPATIENT
Start: 2024-07-15 | End: 2024-07-23 | Stop reason: HOSPADM

## 2024-07-15 RX ORDER — POTASSIUM CHLORIDE 20 MEQ/1
20 TABLET, EXTENDED RELEASE ORAL 2 TIMES DAILY PRN
Status: DISCONTINUED | OUTPATIENT
Start: 2024-07-15 | End: 2024-07-23 | Stop reason: HOSPADM

## 2024-07-15 RX ORDER — SODIUM CHLORIDE 0.9 % (FLUSH) 0.9 %
5-40 SYRINGE (ML) INJECTION PRN
Status: DISCONTINUED | OUTPATIENT
Start: 2024-07-15 | End: 2024-07-22

## 2024-07-15 RX ORDER — ONDANSETRON 4 MG/1
4 TABLET, ORALLY DISINTEGRATING ORAL EVERY 8 HOURS PRN
Status: DISCONTINUED | OUTPATIENT
Start: 2024-07-15 | End: 2024-07-16

## 2024-07-15 RX ORDER — LANOLIN ALCOHOL/MO/W.PET/CERES
400 CREAM (GRAM) TOPICAL 4 TIMES DAILY PRN
Status: DISCONTINUED | OUTPATIENT
Start: 2024-07-15 | End: 2024-07-23 | Stop reason: HOSPADM

## 2024-07-15 RX ORDER — SENNA AND DOCUSATE SODIUM 50; 8.6 MG/1; MG/1
1 TABLET, FILM COATED ORAL 2 TIMES DAILY
Status: DISCONTINUED | OUTPATIENT
Start: 2024-07-15 | End: 2024-07-16

## 2024-07-15 RX ORDER — POTASSIUM CHLORIDE 20 MEQ/1
40 TABLET, EXTENDED RELEASE ORAL 2 TIMES DAILY PRN
Status: DISCONTINUED | OUTPATIENT
Start: 2024-07-15 | End: 2024-07-23 | Stop reason: HOSPADM

## 2024-07-15 RX ORDER — DEXTROSE MONOHYDRATE 100 MG/ML
INJECTION, SOLUTION INTRAVENOUS CONTINUOUS PRN
Status: DISCONTINUED | OUTPATIENT
Start: 2024-07-15 | End: 2024-07-23 | Stop reason: HOSPADM

## 2024-07-15 RX ORDER — OXYCODONE HYDROCHLORIDE AND ACETAMINOPHEN 5; 325 MG/1; MG/1
1 TABLET ORAL EVERY 4 HOURS PRN
Status: DISCONTINUED | OUTPATIENT
Start: 2024-07-15 | End: 2024-07-23 | Stop reason: HOSPADM

## 2024-07-15 RX ORDER — POTASSIUM CHLORIDE 750 MG/1
10 TABLET, EXTENDED RELEASE ORAL DAILY
Status: DISCONTINUED | OUTPATIENT
Start: 2024-07-16 | End: 2024-07-16

## 2024-07-15 RX ORDER — FAMOTIDINE 20 MG/1
20 TABLET, FILM COATED ORAL 2 TIMES DAILY
Status: DISCONTINUED | OUTPATIENT
Start: 2024-07-15 | End: 2024-07-23 | Stop reason: HOSPADM

## 2024-07-15 RX ORDER — ACETAMINOPHEN 325 MG/1
650 TABLET ORAL EVERY 4 HOURS PRN
Status: DISCONTINUED | OUTPATIENT
Start: 2024-07-15 | End: 2024-07-17 | Stop reason: SDUPTHER

## 2024-07-15 RX ORDER — LANOLIN ALCOHOL/MO/W.PET/CERES
400 CREAM (GRAM) TOPICAL 3 TIMES DAILY PRN
Status: DISCONTINUED | OUTPATIENT
Start: 2024-07-15 | End: 2024-07-23 | Stop reason: HOSPADM

## 2024-07-15 RX ORDER — POLYETHYLENE GLYCOL 3350 17 G/17G
17 POWDER, FOR SOLUTION ORAL DAILY PRN
Status: DISCONTINUED | OUTPATIENT
Start: 2024-07-15 | End: 2024-07-17 | Stop reason: SDUPTHER

## 2024-07-15 RX ORDER — PROMETHAZINE HYDROCHLORIDE 12.5 MG/1
12.5 TABLET ORAL EVERY 6 HOURS PRN
Status: DISCONTINUED | OUTPATIENT
Start: 2024-07-15 | End: 2024-07-23 | Stop reason: HOSPADM

## 2024-07-15 RX ORDER — INSULIN LISPRO 100 [IU]/ML
0-12 INJECTION, SOLUTION INTRAVENOUS; SUBCUTANEOUS
Status: DISCONTINUED | OUTPATIENT
Start: 2024-07-15 | End: 2024-07-23

## 2024-07-15 RX ORDER — SODIUM CHLORIDE 0.9 % (FLUSH) 0.9 %
5-40 SYRINGE (ML) INJECTION EVERY 12 HOURS SCHEDULED
Status: DISCONTINUED | OUTPATIENT
Start: 2024-07-15 | End: 2024-07-22

## 2024-07-15 RX ADMIN — SODIUM CHLORIDE, PRESERVATIVE FREE 10 ML: 5 INJECTION INTRAVENOUS at 20:44

## 2024-07-15 RX ADMIN — FAMOTIDINE 20 MG: 20 TABLET, FILM COATED ORAL at 08:58

## 2024-07-15 RX ADMIN — ROSUVASTATIN CALCIUM 20 MG: 20 TABLET, FILM COATED ORAL at 20:42

## 2024-07-15 RX ADMIN — ONDANSETRON 4 MG: 4 TABLET, ORALLY DISINTEGRATING ORAL at 12:36

## 2024-07-15 RX ADMIN — DOCUSATE SODIUM 50 MG AND SENNOSIDES 8.6 MG 2 TABLET: 8.6; 5 TABLET, FILM COATED ORAL at 08:58

## 2024-07-15 RX ADMIN — POTASSIUM CHLORIDE 20 MEQ: 400 INJECTION, SOLUTION INTRAVENOUS at 05:19

## 2024-07-15 RX ADMIN — OXYCODONE HYDROCHLORIDE AND ACETAMINOPHEN 1 TABLET: 5; 325 TABLET ORAL at 05:35

## 2024-07-15 RX ADMIN — CARVEDILOL 6.25 MG: 6.25 TABLET, FILM COATED ORAL at 08:58

## 2024-07-15 RX ADMIN — INSULIN LISPRO 2 UNITS: 100 INJECTION, SOLUTION INTRAVENOUS; SUBCUTANEOUS at 17:54

## 2024-07-15 RX ADMIN — ASPIRIN 81 MG: 81 TABLET, COATED ORAL at 08:58

## 2024-07-15 RX ADMIN — FAMOTIDINE 20 MG: 20 TABLET, FILM COATED ORAL at 20:43

## 2024-07-15 RX ADMIN — Medication 1 AMPULE: at 20:46

## 2024-07-15 RX ADMIN — Medication 1 AMPULE: at 08:59

## 2024-07-15 RX ADMIN — CARVEDILOL 6.25 MG: 6.25 TABLET, FILM COATED ORAL at 20:43

## 2024-07-15 RX ADMIN — FUROSEMIDE 20 MG: 10 INJECTION, SOLUTION INTRAMUSCULAR; INTRAVENOUS at 08:59

## 2024-07-15 RX ADMIN — LEVOTHYROXINE SODIUM 75 MCG: 0.07 TABLET ORAL at 05:19

## 2024-07-15 RX ADMIN — ACETAMINOPHEN 650 MG: 325 TABLET ORAL at 20:42

## 2024-07-15 RX ADMIN — SODIUM CHLORIDE, PRESERVATIVE FREE 10 ML: 5 INJECTION INTRAVENOUS at 09:03

## 2024-07-15 NOTE — INTERDISCIPLINARY ROUNDS
Multi-D Rounds/Checklist (leapfrog):  Lines: can any be removed?: Central line    Negative Pressure Wound Therapy Sternum Anterior (Active)       Urinary Catheter 07/11/24 2 Way;Gross-Temperature (Active)     Introducer 07/11/24 (Active)       CVC  07/11/24 Right Internal jugular (Active)     DVT Prophylaxis: Ordered  Vent: N/A  Nutrition Ordered/appropriate: Per Primary Team  Can antibiotics or other drugs be stopped? Yes/End Date set Yes  Inpat Anti-Infectives (From admission, onward)       Start     Ordered Stop    07/11/24 1315  ceFAZolin (ANCEF) 2000 mg in sterile water 20 mL IV syringe  2,000 mg,   IntraVENous,   EVERY 8 HOURS        Note to Pharmacy: Default Settings:Auto-dosed by Weight Q8h x 2 doses  Auto-dosed: Pt Wt<119.9kg-2gm, >120kg-3gm    07/11/24 1254 07/13/24 0514                  Consults needed: None  A: Is pain control adequate? (has PRNs? Stop drip?) Yes  B: Sedation break and SBT? N/A  C: Is sedation choice appropriate? N/A  D: Delirium/CAM-ICU? No  E: Mobility goals/appropriateness? Yes  F: Family update and plan?  is surrogate decision maker and is being updated daily by primary attending and nursing staff.    Muna Montero, LAKESHA - CNP

## 2024-07-16 ENCOUNTER — APPOINTMENT (OUTPATIENT)
Dept: GENERAL RADIOLOGY | Age: 79
DRG: 212 | End: 2024-07-16
Payer: MEDICARE

## 2024-07-16 PROBLEM — Z95.2 S/P MVR (MITRAL VALVE REPLACEMENT): Status: ACTIVE | Noted: 2024-07-13

## 2024-07-16 PROBLEM — R41.82 ALTERED MENTAL STATUS: Status: RESOLVED | Noted: 2024-07-02 | Resolved: 2024-07-16

## 2024-07-16 PROBLEM — R06.02 SHORTNESS OF BREATH: Status: RESOLVED | Noted: 2024-07-08 | Resolved: 2024-07-16

## 2024-07-16 PROBLEM — E87.70 VOLUME OVERLOAD: Status: RESOLVED | Noted: 2024-06-29 | Resolved: 2024-07-16

## 2024-07-16 PROBLEM — I47.29 NSVT (NONSUSTAINED VENTRICULAR TACHYCARDIA) (HCC): Status: ACTIVE | Noted: 2024-07-13

## 2024-07-16 PROBLEM — R94.31 PROLONGED QT INTERVAL: Status: ACTIVE | Noted: 2024-07-15

## 2024-07-16 LAB
ANION GAP SERPL CALC-SCNC: 14 MMOL/L (ref 9–18)
BUN SERPL-MCNC: 9 MG/DL (ref 8–23)
CALCIUM SERPL-MCNC: 8.6 MG/DL (ref 8.8–10.2)
CHLORIDE SERPL-SCNC: 103 MMOL/L (ref 98–107)
CO2 SERPL-SCNC: 22 MMOL/L (ref 20–28)
CREAT SERPL-MCNC: 0.68 MG/DL (ref 0.6–1.1)
EKG ATRIAL RATE: 74 BPM
EKG DIAGNOSIS: NORMAL
EKG P AXIS: 32 DEGREES
EKG P-R INTERVAL: 226 MS
EKG Q-T INTERVAL: 460 MS
EKG QRS DURATION: 114 MS
EKG QTC CALCULATION (BAZETT): 510 MS
EKG R AXIS: 51 DEGREES
EKG T AXIS: 247 DEGREES
EKG VENTRICULAR RATE: 74 BPM
ERYTHROCYTE [DISTWIDTH] IN BLOOD BY AUTOMATED COUNT: 13.2 % (ref 11.9–14.6)
GLUCOSE BLD STRIP.AUTO-MCNC: 101 MG/DL (ref 65–100)
GLUCOSE BLD STRIP.AUTO-MCNC: 103 MG/DL (ref 65–100)
GLUCOSE BLD STRIP.AUTO-MCNC: 109 MG/DL (ref 65–100)
GLUCOSE SERPL-MCNC: 100 MG/DL (ref 70–99)
HCT VFR BLD AUTO: 34.9 % (ref 35.8–46.3)
HGB BLD-MCNC: 11.4 G/DL (ref 11.7–15.4)
MAGNESIUM SERPL-MCNC: 1.9 MG/DL (ref 1.8–2.4)
MCH RBC QN AUTO: 29.6 PG (ref 26.1–32.9)
MCHC RBC AUTO-ENTMCNC: 32.7 G/DL (ref 31.4–35)
MCV RBC AUTO: 90.6 FL (ref 82–102)
NRBC # BLD: 0 K/UL (ref 0–0.2)
PLATELET # BLD AUTO: 143 K/UL (ref 150–450)
PMV BLD AUTO: 10.5 FL (ref 9.4–12.3)
POTASSIUM SERPL-SCNC: 3.2 MMOL/L (ref 3.5–5.1)
RBC # BLD AUTO: 3.85 M/UL (ref 4.05–5.2)
SERVICE CMNT-IMP: ABNORMAL
SODIUM SERPL-SCNC: 139 MMOL/L (ref 136–145)
WBC # BLD AUTO: 12.5 K/UL (ref 4.3–11.1)

## 2024-07-16 PROCEDURE — 71046 X-RAY EXAM CHEST 2 VIEWS: CPT

## 2024-07-16 PROCEDURE — 82962 GLUCOSE BLOOD TEST: CPT

## 2024-07-16 PROCEDURE — 93005 ELECTROCARDIOGRAM TRACING: CPT | Performed by: PHYSICIAN ASSISTANT

## 2024-07-16 PROCEDURE — 2140000001 HC CVICU INTERMEDIATE R&B

## 2024-07-16 PROCEDURE — 93010 ELECTROCARDIOGRAM REPORT: CPT | Performed by: INTERNAL MEDICINE

## 2024-07-16 PROCEDURE — 83735 ASSAY OF MAGNESIUM: CPT

## 2024-07-16 PROCEDURE — 85027 COMPLETE CBC AUTOMATED: CPT

## 2024-07-16 PROCEDURE — 97530 THERAPEUTIC ACTIVITIES: CPT

## 2024-07-16 PROCEDURE — 6370000000 HC RX 637 (ALT 250 FOR IP): Performed by: PHYSICIAN ASSISTANT

## 2024-07-16 PROCEDURE — 2580000003 HC RX 258: Performed by: PHYSICIAN ASSISTANT

## 2024-07-16 PROCEDURE — 36415 COLL VENOUS BLD VENIPUNCTURE: CPT

## 2024-07-16 PROCEDURE — 6360000002 HC RX W HCPCS: Performed by: INTERNAL MEDICINE

## 2024-07-16 PROCEDURE — 80048 BASIC METABOLIC PNL TOTAL CA: CPT

## 2024-07-16 PROCEDURE — 99232 SBSQ HOSP IP/OBS MODERATE 35: CPT | Performed by: INTERNAL MEDICINE

## 2024-07-16 RX ORDER — POTASSIUM CHLORIDE 20 MEQ/1
40 TABLET, EXTENDED RELEASE ORAL ONCE
Status: DISCONTINUED | OUTPATIENT
Start: 2024-07-16 | End: 2024-07-22

## 2024-07-16 RX ORDER — GUAIFENESIN 600 MG/1
600 TABLET, EXTENDED RELEASE ORAL 2 TIMES DAILY
Status: DISCONTINUED | OUTPATIENT
Start: 2024-07-16 | End: 2024-07-23 | Stop reason: HOSPADM

## 2024-07-16 RX ORDER — LOSARTAN POTASSIUM 50 MG/1
50 TABLET ORAL DAILY
Status: DISCONTINUED | OUTPATIENT
Start: 2024-07-17 | End: 2024-07-17

## 2024-07-16 RX ORDER — MAGNESIUM SULFATE IN WATER 40 MG/ML
2000 INJECTION, SOLUTION INTRAVENOUS ONCE
Status: COMPLETED | OUTPATIENT
Start: 2024-07-16 | End: 2024-07-16

## 2024-07-16 RX ADMIN — MAGNESIUM SULFATE HEPTAHYDRATE 2000 MG: 40 INJECTION, SOLUTION INTRAVENOUS at 11:49

## 2024-07-16 RX ADMIN — LEVOTHYROXINE SODIUM 75 MCG: 0.07 TABLET ORAL at 06:14

## 2024-07-16 RX ADMIN — Medication 1 AMPULE: at 07:43

## 2024-07-16 RX ADMIN — POTASSIUM CHLORIDE 10 MEQ: 750 TABLET, EXTENDED RELEASE ORAL at 07:44

## 2024-07-16 RX ADMIN — FUROSEMIDE 40 MG: 40 TABLET ORAL at 07:41

## 2024-07-16 RX ADMIN — AMLODIPINE BESYLATE 5 MG: 5 TABLET ORAL at 07:44

## 2024-07-16 RX ADMIN — Medication 1 AMPULE: at 21:18

## 2024-07-16 RX ADMIN — OXYCODONE HYDROCHLORIDE AND ACETAMINOPHEN 1 TABLET: 5; 325 TABLET ORAL at 17:33

## 2024-07-16 RX ADMIN — CARVEDILOL 6.25 MG: 6.25 TABLET, FILM COATED ORAL at 21:18

## 2024-07-16 RX ADMIN — GUAIFENESIN 600 MG: 600 TABLET ORAL at 07:43

## 2024-07-16 RX ADMIN — SODIUM CHLORIDE, PRESERVATIVE FREE 10 ML: 5 INJECTION INTRAVENOUS at 07:45

## 2024-07-16 RX ADMIN — ROSUVASTATIN CALCIUM 20 MG: 20 TABLET, FILM COATED ORAL at 21:18

## 2024-07-16 RX ADMIN — POTASSIUM CHLORIDE 40 MEQ: 1500 TABLET, EXTENDED RELEASE ORAL at 17:33

## 2024-07-16 RX ADMIN — CARVEDILOL 6.25 MG: 6.25 TABLET, FILM COATED ORAL at 07:42

## 2024-07-16 RX ADMIN — ASPIRIN 81 MG: 81 TABLET, COATED ORAL at 07:41

## 2024-07-16 RX ADMIN — POTASSIUM CHLORIDE 40 MEQ: 1500 TABLET, EXTENDED RELEASE ORAL at 07:48

## 2024-07-16 RX ADMIN — FAMOTIDINE 20 MG: 20 TABLET, FILM COATED ORAL at 07:42

## 2024-07-16 RX ADMIN — SODIUM CHLORIDE, PRESERVATIVE FREE 10 ML: 5 INJECTION INTRAVENOUS at 21:18

## 2024-07-16 RX ADMIN — GUAIFENESIN 600 MG: 600 TABLET ORAL at 21:18

## 2024-07-16 RX ADMIN — FAMOTIDINE 20 MG: 20 TABLET, FILM COATED ORAL at 21:18

## 2024-07-16 RX ADMIN — TRAMADOL HYDROCHLORIDE 50 MG: 50 TABLET ORAL at 04:40

## 2024-07-16 RX ADMIN — OXYCODONE HYDROCHLORIDE AND ACETAMINOPHEN 1 TABLET: 5; 325 TABLET ORAL at 07:42

## 2024-07-16 NOTE — FLOWSHEET NOTE
Pt experiencing dizziness. OPHELIA Hanson notified. Orthos performed.     07/16/24 1437   Vital Signs   Orthostatic B/P and Pulse? Yes   Blood Pressure Lying 117/56   Pulse Lying 70 PER MINUTE   Blood Pressure Sitting 107/63   Pulse Sitting 80 PER MINUTE   Blood Pressure Standing 115/89   Pulse Standing 87 PER MINUTE

## 2024-07-16 NOTE — MANAGEMENT PLAN
EP Note    78 year old female s/p AVR and MVR. C/b TdP, EF 20%. Given VT which could recur (not completely reversible) favor ICD prior to d/c if pt open to doing this. Will make NPO for tomorrow in the event the patient desires to undergo ICD implant. Maintain K/Mg, avoid QTC prolonging agents.     Don Castro MD, MS  Clinical Cardiac Electrophysiology  [facility] Card[facility]

## 2024-07-17 ENCOUNTER — ANESTHESIA (OUTPATIENT)
Dept: CARDIAC CATH/INVASIVE PROCEDURES | Age: 79
End: 2024-07-17
Payer: MEDICARE

## 2024-07-17 ENCOUNTER — ANESTHESIA EVENT (OUTPATIENT)
Dept: CARDIAC CATH/INVASIVE PROCEDURES | Age: 79
End: 2024-07-17
Payer: MEDICARE

## 2024-07-17 ENCOUNTER — APPOINTMENT (OUTPATIENT)
Dept: GENERAL RADIOLOGY | Age: 79
DRG: 212 | End: 2024-07-17
Payer: MEDICARE

## 2024-07-17 PROBLEM — I48.92 ATRIAL FLUTTER (HCC): Status: ACTIVE | Noted: 2024-07-17

## 2024-07-17 LAB
ANION GAP SERPL CALC-SCNC: 9 MMOL/L (ref 9–18)
BUN SERPL-MCNC: 6 MG/DL (ref 8–23)
CALCIUM SERPL-MCNC: 8.7 MG/DL (ref 8.8–10.2)
CHLORIDE SERPL-SCNC: 102 MMOL/L (ref 98–107)
CO2 SERPL-SCNC: 27 MMOL/L (ref 20–28)
CREAT SERPL-MCNC: 0.63 MG/DL (ref 0.6–1.1)
ECHO BSA: 1.77 M2
EKG ATRIAL RATE: 234 BPM
EKG ATRIAL RATE: 60 BPM
EKG DIAGNOSIS: NORMAL
EKG DIAGNOSIS: NORMAL
EKG P AXIS: 266 DEGREES
EKG P AXIS: 81 DEGREES
EKG P-R INTERVAL: 238 MS
EKG Q-T INTERVAL: 472 MS
EKG Q-T INTERVAL: 472 MS
EKG QRS DURATION: 106 MS
EKG QRS DURATION: 114 MS
EKG QTC CALCULATION (BAZETT): 472 MS
EKG QTC CALCULATION (BAZETT): 483 MS
EKG R AXIS: 72 DEGREES
EKG R AXIS: 78 DEGREES
EKG T AXIS: -89 DEGREES
EKG T AXIS: 264 DEGREES
EKG VENTRICULAR RATE: 60 BPM
EKG VENTRICULAR RATE: 63 BPM
GLUCOSE BLD STRIP.AUTO-MCNC: 126 MG/DL (ref 65–100)
GLUCOSE BLD STRIP.AUTO-MCNC: 84 MG/DL (ref 65–100)
GLUCOSE BLD STRIP.AUTO-MCNC: 93 MG/DL (ref 65–100)
GLUCOSE BLD STRIP.AUTO-MCNC: 97 MG/DL (ref 65–100)
GLUCOSE SERPL-MCNC: 99 MG/DL (ref 70–99)
MAGNESIUM SERPL-MCNC: 2 MG/DL (ref 1.8–2.4)
POTASSIUM SERPL-SCNC: 4 MMOL/L (ref 3.5–5.1)
SERVICE CMNT-IMP: ABNORMAL
SERVICE CMNT-IMP: NORMAL
SODIUM SERPL-SCNC: 138 MMOL/L (ref 136–145)

## 2024-07-17 PROCEDURE — 2720000010 HC SURG SUPPLY STERILE: Performed by: INTERNAL MEDICINE

## 2024-07-17 PROCEDURE — 93010 ELECTROCARDIOGRAM REPORT: CPT | Performed by: INTERNAL MEDICINE

## 2024-07-17 PROCEDURE — 82962 GLUCOSE BLOOD TEST: CPT

## 2024-07-17 PROCEDURE — 6370000000 HC RX 637 (ALT 250 FOR IP): Performed by: PHYSICIAN ASSISTANT

## 2024-07-17 PROCEDURE — 71045 X-RAY EXAM CHEST 1 VIEW: CPT

## 2024-07-17 PROCEDURE — C1892 INTRO/SHEATH,FIXED,PEEL-AWAY: HCPCS | Performed by: INTERNAL MEDICINE

## 2024-07-17 PROCEDURE — 3700000001 HC ADD 15 MINUTES (ANESTHESIA): Performed by: INTERNAL MEDICINE

## 2024-07-17 PROCEDURE — 3700000000 HC ANESTHESIA ATTENDED CARE: Performed by: INTERNAL MEDICINE

## 2024-07-17 PROCEDURE — 2500000003 HC RX 250 WO HCPCS: Performed by: INTERNAL MEDICINE

## 2024-07-17 PROCEDURE — 2580000003 HC RX 258

## 2024-07-17 PROCEDURE — 2500000003 HC RX 250 WO HCPCS

## 2024-07-17 PROCEDURE — 6360000002 HC RX W HCPCS

## 2024-07-17 PROCEDURE — 80048 BASIC METABOLIC PNL TOTAL CA: CPT

## 2024-07-17 PROCEDURE — 2580000003 HC RX 258: Performed by: INTERNAL MEDICINE

## 2024-07-17 PROCEDURE — 2709999900 HC NON-CHARGEABLE SUPPLY: Performed by: INTERNAL MEDICINE

## 2024-07-17 PROCEDURE — 97110 THERAPEUTIC EXERCISES: CPT

## 2024-07-17 PROCEDURE — C1721 AICD, DUAL CHAMBER: HCPCS | Performed by: INTERNAL MEDICINE

## 2024-07-17 PROCEDURE — 6360000002 HC RX W HCPCS: Performed by: ANESTHESIOLOGY

## 2024-07-17 PROCEDURE — 0JH608Z INSERTION OF DEFIBRILLATOR GENERATOR INTO CHEST SUBCUTANEOUS TISSUE AND FASCIA, OPEN APPROACH: ICD-10-PCS | Performed by: INTERNAL MEDICINE

## 2024-07-17 PROCEDURE — 83735 ASSAY OF MAGNESIUM: CPT

## 2024-07-17 PROCEDURE — 97530 THERAPEUTIC ACTIVITIES: CPT

## 2024-07-17 PROCEDURE — 02H63KZ INSERTION OF DEFIBRILLATOR LEAD INTO RIGHT ATRIUM, PERCUTANEOUS APPROACH: ICD-10-PCS | Performed by: INTERNAL MEDICINE

## 2024-07-17 PROCEDURE — 33249 INSJ/RPLCMT DEFIB W/LEAD(S): CPT | Performed by: INTERNAL MEDICINE

## 2024-07-17 PROCEDURE — 6370000000 HC RX 637 (ALT 250 FOR IP): Performed by: THORACIC SURGERY (CARDIOTHORACIC VASCULAR SURGERY)

## 2024-07-17 PROCEDURE — 2140000001 HC CVICU INTERMEDIATE R&B

## 2024-07-17 PROCEDURE — 02HK3KZ INSERTION OF DEFIBRILLATOR LEAD INTO RIGHT VENTRICLE, PERCUTANEOUS APPROACH: ICD-10-PCS | Performed by: INTERNAL MEDICINE

## 2024-07-17 PROCEDURE — 36415 COLL VENOUS BLD VENIPUNCTURE: CPT

## 2024-07-17 PROCEDURE — C1889 IMPLANT/INSERT DEVICE, NOC: HCPCS | Performed by: INTERNAL MEDICINE

## 2024-07-17 PROCEDURE — C1777 LEAD, AICD, ENDO SINGLE COIL: HCPCS | Performed by: INTERNAL MEDICINE

## 2024-07-17 PROCEDURE — 2580000003 HC RX 258: Performed by: PHYSICIAN ASSISTANT

## 2024-07-17 PROCEDURE — 99232 SBSQ HOSP IP/OBS MODERATE 35: CPT | Performed by: INTERNAL MEDICINE

## 2024-07-17 PROCEDURE — C1898 LEAD, PMKR, OTHER THAN TRANS: HCPCS | Performed by: INTERNAL MEDICINE

## 2024-07-17 PROCEDURE — C1894 INTRO/SHEATH, NON-LASER: HCPCS | Performed by: INTERNAL MEDICINE

## 2024-07-17 PROCEDURE — 93005 ELECTROCARDIOGRAM TRACING: CPT | Performed by: PHYSICIAN ASSISTANT

## 2024-07-17 PROCEDURE — 93005 ELECTROCARDIOGRAM TRACING: CPT | Performed by: INTERNAL MEDICINE

## 2024-07-17 PROCEDURE — 6360000002 HC RX W HCPCS: Performed by: INTERNAL MEDICINE

## 2024-07-17 DEVICE — THE CANGAROO® ENVELOPE IS INTENDED TO SECURELY HOLD A CARDIAC IMPLANTABLE ELECTRONIC DEVICE OR AN IMPLANTABLE NEUROSTIMULATOR TO CREATE A STABLE ENVIRONMENT WHEN IMPLANTED IN THE BODY. THE CARDIAC IMPLANTABLE ELECTRONIC DEVICES THAT MAY BE USED WITH THE CANGAROO® ENVELOPE INCLUDE PACEMAKER PULSE GENERATORS, DEFIBRILLATORS, OR OTHER CARDIAC IMPLANTABLE ELECTRONIC DEVICES. THE IMPLANTABLE NEUROSTIMULATOR DEVICES THAT MAY BE USED WITH THE CANGAROO® ENVELOPE INCLUDE VAGUS NERVE STIMULATORS, SPINAL CORDNEUROMODULATORS, DEEP BRAIN STIMULATORS AND SACRAL NERVE STIMULATORS.
Type: IMPLANTABLE DEVICE | Status: FUNCTIONAL
Brand: CANGAROO ENVELOPE

## 2024-07-17 DEVICE — PACE/SENSE LEAD
Type: IMPLANTABLE DEVICE | Status: FUNCTIONAL
Brand: INGEVITY™+

## 2024-07-17 DEVICE — IMPLANTABLE CARDIOVERTER DEFIBRILLATOR DR
Type: IMPLANTABLE DEVICE | Status: FUNCTIONAL
Brand: VIGILANT™ EL ICD DR

## 2024-07-17 DEVICE — INTEGRATED BIPOLAR PACE/SENSE AND DEFIBRILLATION LEAD
Type: IMPLANTABLE DEVICE | Status: FUNCTIONAL
Brand: RELIANCE 4-FRONT™

## 2024-07-17 RX ORDER — CALCIUM CHLORIDE 100 MG/ML
INJECTION INTRAVENOUS; INTRAVENTRICULAR PRN
Status: DISCONTINUED | OUTPATIENT
Start: 2024-07-17 | End: 2024-07-17 | Stop reason: SDUPTHER

## 2024-07-17 RX ORDER — POLYETHYLENE GLYCOL 3350 17 G/17G
17 POWDER, FOR SOLUTION ORAL DAILY PRN
Status: DISCONTINUED | OUTPATIENT
Start: 2024-07-17 | End: 2024-07-23 | Stop reason: HOSPADM

## 2024-07-17 RX ORDER — LOSARTAN POTASSIUM 25 MG/1
25 TABLET ORAL DAILY
Status: DISCONTINUED | OUTPATIENT
Start: 2024-07-17 | End: 2024-07-23 | Stop reason: HOSPADM

## 2024-07-17 RX ORDER — HYDROMORPHONE HYDROCHLORIDE 2 MG/ML
0.5 INJECTION, SOLUTION INTRAMUSCULAR; INTRAVENOUS; SUBCUTANEOUS
Status: DISCONTINUED | OUTPATIENT
Start: 2024-07-17 | End: 2024-07-17 | Stop reason: HOSPADM

## 2024-07-17 RX ORDER — ACETAMINOPHEN 325 MG/1
650 TABLET ORAL ONCE
Status: DISCONTINUED | OUTPATIENT
Start: 2024-07-17 | End: 2024-07-17 | Stop reason: HOSPADM

## 2024-07-17 RX ORDER — ACETAMINOPHEN 325 MG/1
650 TABLET ORAL EVERY 4 HOURS PRN
Status: DISCONTINUED | OUTPATIENT
Start: 2024-07-17 | End: 2024-07-23 | Stop reason: HOSPADM

## 2024-07-17 RX ORDER — SODIUM CHLORIDE, SODIUM LACTATE, POTASSIUM CHLORIDE, CALCIUM CHLORIDE 600; 310; 30; 20 MG/100ML; MG/100ML; MG/100ML; MG/100ML
INJECTION, SOLUTION INTRAVENOUS CONTINUOUS PRN
Status: DISCONTINUED | OUTPATIENT
Start: 2024-07-17 | End: 2024-07-17 | Stop reason: SDUPTHER

## 2024-07-17 RX ORDER — KETOROLAC TROMETHAMINE 15 MG/ML
15 INJECTION, SOLUTION INTRAMUSCULAR; INTRAVENOUS ONCE
Status: COMPLETED | OUTPATIENT
Start: 2024-07-17 | End: 2024-07-17

## 2024-07-17 RX ORDER — SODIUM CHLORIDE 0.9 % (FLUSH) 0.9 %
5-40 SYRINGE (ML) INJECTION EVERY 12 HOURS SCHEDULED
Status: DISCONTINUED | OUTPATIENT
Start: 2024-07-17 | End: 2024-07-23 | Stop reason: HOSPADM

## 2024-07-17 RX ORDER — ACETAMINOPHEN 325 MG/1
325 TABLET ORAL ONCE
Status: COMPLETED | OUTPATIENT
Start: 2024-07-17 | End: 2024-07-17

## 2024-07-17 RX ORDER — LIDOCAINE HYDROCHLORIDE AND EPINEPHRINE 10; 10 MG/ML; UG/ML
INJECTION, SOLUTION INFILTRATION; PERINEURAL PRN
Status: DISCONTINUED | OUTPATIENT
Start: 2024-07-17 | End: 2024-07-17 | Stop reason: HOSPADM

## 2024-07-17 RX ORDER — OXYCODONE HYDROCHLORIDE 5 MG/1
5 TABLET ORAL ONCE
Status: COMPLETED | OUTPATIENT
Start: 2024-07-17 | End: 2024-07-17

## 2024-07-17 RX ORDER — LIDOCAINE HYDROCHLORIDE 20 MG/ML
INJECTION, SOLUTION EPIDURAL; INFILTRATION; INTRACAUDAL; PERINEURAL PRN
Status: DISCONTINUED | OUTPATIENT
Start: 2024-07-17 | End: 2024-07-17 | Stop reason: SDUPTHER

## 2024-07-17 RX ORDER — PROPOFOL 10 MG/ML
INJECTION, EMULSION INTRAVENOUS CONTINUOUS PRN
Status: DISCONTINUED | OUTPATIENT
Start: 2024-07-17 | End: 2024-07-17 | Stop reason: SDUPTHER

## 2024-07-17 RX ORDER — SODIUM CHLORIDE 9 MG/ML
INJECTION, SOLUTION INTRAVENOUS PRN
Status: DISCONTINUED | OUTPATIENT
Start: 2024-07-17 | End: 2024-07-23 | Stop reason: HOSPADM

## 2024-07-17 RX ORDER — SODIUM CHLORIDE 0.9 % (FLUSH) 0.9 %
5-40 SYRINGE (ML) INJECTION PRN
Status: DISCONTINUED | OUTPATIENT
Start: 2024-07-17 | End: 2024-07-23 | Stop reason: HOSPADM

## 2024-07-17 RX ORDER — ONDANSETRON 2 MG/ML
4 INJECTION INTRAMUSCULAR; INTRAVENOUS EVERY 6 HOURS PRN
Status: DISCONTINUED | OUTPATIENT
Start: 2024-07-17 | End: 2024-07-23 | Stop reason: HOSPADM

## 2024-07-17 RX ADMIN — PHENYLEPHRINE HYDROCHLORIDE 200 MCG: 10 INJECTION INTRAVENOUS at 13:52

## 2024-07-17 RX ADMIN — FUROSEMIDE 40 MG: 40 TABLET ORAL at 09:26

## 2024-07-17 RX ADMIN — CEFAZOLIN 2000 MG: 10 INJECTION, POWDER, FOR SOLUTION INTRAVENOUS at 21:54

## 2024-07-17 RX ADMIN — CARVEDILOL 6.25 MG: 6.25 TABLET, FILM COATED ORAL at 09:26

## 2024-07-17 RX ADMIN — SODIUM CHLORIDE, SODIUM LACTATE, POTASSIUM CHLORIDE, AND CALCIUM CHLORIDE: 600; 310; 30; 20 INJECTION, SOLUTION INTRAVENOUS at 13:32

## 2024-07-17 RX ADMIN — CALCIUM CHLORIDE 0.3 G: 100 INJECTION INTRAVENOUS; INTRAVENTRICULAR at 14:06

## 2024-07-17 RX ADMIN — PHENYLEPHRINE HYDROCHLORIDE 200 MCG: 10 INJECTION INTRAVENOUS at 13:59

## 2024-07-17 RX ADMIN — POTASSIUM CHLORIDE 20 MEQ: 1500 TABLET, EXTENDED RELEASE ORAL at 09:23

## 2024-07-17 RX ADMIN — FAMOTIDINE 20 MG: 20 TABLET, FILM COATED ORAL at 09:26

## 2024-07-17 RX ADMIN — TRAMADOL HYDROCHLORIDE 50 MG: 50 TABLET ORAL at 09:25

## 2024-07-17 RX ADMIN — CARVEDILOL 6.25 MG: 6.25 TABLET, FILM COATED ORAL at 21:40

## 2024-07-17 RX ADMIN — GUAIFENESIN 600 MG: 600 TABLET ORAL at 09:26

## 2024-07-17 RX ADMIN — PROPOFOL 100 MCG/KG/MIN: 10 INJECTION, EMULSION INTRAVENOUS at 13:43

## 2024-07-17 RX ADMIN — ROSUVASTATIN CALCIUM 20 MG: 20 TABLET, FILM COATED ORAL at 21:40

## 2024-07-17 RX ADMIN — SODIUM CHLORIDE, PRESERVATIVE FREE 10 ML: 5 INJECTION INTRAVENOUS at 21:42

## 2024-07-17 RX ADMIN — OXYCODONE HYDROCHLORIDE AND ACETAMINOPHEN 1 TABLET: 5; 325 TABLET ORAL at 17:19

## 2024-07-17 RX ADMIN — CALCIUM CHLORIDE 0.3 G: 100 INJECTION INTRAVENOUS; INTRAVENTRICULAR at 14:01

## 2024-07-17 RX ADMIN — KETOROLAC TROMETHAMINE 15 MG: 15 INJECTION, SOLUTION INTRAMUSCULAR; INTRAVENOUS at 15:49

## 2024-07-17 RX ADMIN — OXYCODONE HYDROCHLORIDE 5 MG: 5 TABLET ORAL at 13:13

## 2024-07-17 RX ADMIN — TRAMADOL HYDROCHLORIDE 50 MG: 50 TABLET ORAL at 02:12

## 2024-07-17 RX ADMIN — ASPIRIN 81 MG: 81 TABLET, COATED ORAL at 09:26

## 2024-07-17 RX ADMIN — LIDOCAINE HYDROCHLORIDE 40 MG: 20 INJECTION, SOLUTION EPIDURAL; INFILTRATION; INTRACAUDAL; PERINEURAL at 13:43

## 2024-07-17 RX ADMIN — LEVOTHYROXINE SODIUM 75 MCG: 0.07 TABLET ORAL at 06:23

## 2024-07-17 RX ADMIN — GUAIFENESIN 600 MG: 600 TABLET ORAL at 21:40

## 2024-07-17 RX ADMIN — ACETAMINOPHEN 325 MG: 325 TABLET ORAL at 13:13

## 2024-07-17 RX ADMIN — Medication 1 AMPULE: at 09:22

## 2024-07-17 RX ADMIN — LOSARTAN POTASSIUM 25 MG: 25 TABLET, FILM COATED ORAL at 09:26

## 2024-07-17 RX ADMIN — FAMOTIDINE 20 MG: 20 TABLET, FILM COATED ORAL at 21:40

## 2024-07-17 RX ADMIN — OXYCODONE HYDROCHLORIDE AND ACETAMINOPHEN 1 TABLET: 5; 325 TABLET ORAL at 03:58

## 2024-07-17 RX ADMIN — Medication 2 G: at 13:45

## 2024-07-17 RX ADMIN — SODIUM CHLORIDE, PRESERVATIVE FREE 10 ML: 5 INJECTION INTRAVENOUS at 09:23

## 2024-07-17 RX ADMIN — PHENYLEPHRINE HYDROCHLORIDE 100 MCG: 10 INJECTION INTRAVENOUS at 13:47

## 2024-07-17 RX ADMIN — Medication 1 AMPULE: at 21:40

## 2024-07-17 RX ADMIN — CALCIUM CHLORIDE 0.4 G: 100 INJECTION INTRAVENOUS; INTRAVENTRICULAR at 14:13

## 2024-07-17 NOTE — ANESTHESIA POSTPROCEDURE EVALUATION
Department of Anesthesiology  Postprocedure Note    Patient: Tanika Medina  MRN: [id number]  YOB: 1945  Date of evaluation: 7/17/2024    Procedure Summary       Date: 07/17/24 Room / Location: [facility] 2 ALL EVENTS / [facility] CARDIAC CATH LAB    Anesthesia Start: 1332 Anesthesia Stop: 1459    Procedure: Insert ICD dual Diagnosis:       VT (ventricular tachycardia) (HCC)      (VT (ventricular tachycardia) (HCC) [I47.20])    Providers: Don Castro MD Responsible Provider: Hari Terrazas MD    Anesthesia Type: TIVA ASA Status: 4            Anesthesia Type: No value filed.    Ashley Phase I: Ashley Score: 9    Ashley Phase II:      Anesthesia Post Evaluation    Patient location during evaluation: PACU  Patient participation: complete - patient participated  Level of consciousness: awake and alert  Airway patency: patent  Nausea & Vomiting: no nausea and no vomiting  Cardiovascular status: hemodynamically stable  Respiratory status: acceptable, nonlabored ventilation and spontaneous ventilation  Hydration status: euvolemic  Comments: BP (!) 149/80   Pulse 60   Temp 97.4 °F (36.3 °C) (Temporal)   Resp 17   Ht 1.626 m (5' 4\")   Wt 70.9 kg (156 lb 4.9 oz)   SpO2 100%   BMI 26.83 kg/m²     Multimodal analgesia pain management approach  Pain management: adequate and satisfactory to patient    There were no known notable events for this encounter.  
English

## 2024-07-17 NOTE — ANESTHESIA PRE PROCEDURE
Department of Anesthesiology  Preprocedure Note       Name:  Tanika Medina   Age:  78 y.o.  :  1945                                          MRN:  [id number]         Date:  2024      Surgeon: Surgeon(s):  Don Castro MD    Procedure: Procedure(s):  Insert ICD dual    Medications prior to admission:   Prior to Admission medications    Medication Sig Start Date End Date Taking? Authorizing Provider   nitroGLYCERIN (NITROSTAT) 0.4 MG SL tablet Place 1 tablet under the tongue every 5 minutes as needed for Chest pain up to max of 3 total doses. If no relief after 1 dose, call 911. 3/26/24   Garry Zuniga MD   DULoxetine (CYMBALTA) 60 MG extended release capsule Take 1 capsule by mouth daily 23   Haley Cao MD   potassium chloride (KLOR-CON 10) 10 MEQ extended release tablet Take 1 tablet by mouth daily 1/18/23 3/26/24  Provider, MD Haley   BIOTIN PO Take by mouth    Automatic Reconciliation, Ar   CALCIUM PO Take 1,200 mg by mouth    Automatic Reconciliation, Ar   aluminum hydroxide-magnesium carbonate (GENATON)  MG/15ML suspension Take 15 mLs by mouth every 6 hours as needed    Automatic Reconciliation, Ar   amoxicillin (AMOXIL) 500 MG capsule Dental procedures  Patient not taking: Reported on 2023   Automatic Reconciliation, Ar   Cholecalciferol 50 MCG ( UT) TABS Take by mouth 2 times daily    Automatic Reconciliation, Ar   cyanocobalamin 1000 MCG tablet Take 1 tablet by mouth daily    Automatic Reconciliation, Ar   cyclobenzaprine (FLEXERIL) 10 MG tablet Take 1 tablet by mouth 3 times daily as needed    Automatic Reconciliation, Ar   diclofenac sodium (VOLTAREN) 1 % GEL Apply topically 4 times daily    Automatic Reconciliation, Ar   gabapentin (NEURONTIN) 100 MG capsule Take 1 capsule by mouth daily. 1/12/22 3/26/24  Automatic Reconciliation, Ar   HYDROcodone-acetaminophen (NORCO)  MG per tablet Take 1 tablet by mouth every 6 hours as needed.

## 2024-07-17 NOTE — CARE COORDINATION
CM spoke to patient, patient's daughter, and patient's spouse at bedside and informed them PT is recommending home health care at this time.     Patient stated that she would like to see if she qualifies to go to [facility] or [facility] for rehab. CM sent referrals accordingly.     CM to follow up.

## 2024-07-17 NOTE — PROCEDURES
: Don Castro MD    REFERRING: Rambo Ellis MD    Pre-Procedure Diagnosis  1. Chronic systolic heart failure, ejection fraction of 20-25%  2. Nonischemic dilated cardiomyopathy.  3. Class II heart failure symptoms.  4. Chronic systolic heart failure for a minimum of 3 months duration on optimal medical therapy.  5. Secondary prevention indications for defibrillator  6. Life expectancy greater than 1 year.    Procedure Performed  1. Insertion of an implantable cardioverter defibrillator.    Complications: None    Fluoroscopy Time: 3.2 minutes       Contrast: 15 cc    Anesthesia: Monitor Anesthesia Care     Estimated Blood Loss: Less than 10 mL     Specimens: * No specimens in log *     Patient Information and Indications: The procedure, indications, risks, benefits, and alternatives were discussed with the patient and family members, who desired to proceed after questions were answered and informed consent was documented.     Procedure: After informed consent was obtained, the patient was brought to the Electrophysiology Laboratory in a fasting state and was prepped and draped in sterile fashion. Prophylactic antibiotic was administered 10 minutes prior to skin incision: refer to anesthesia procedural documentation for full details. Conscious sedation was administered by anesthesia with continuous oxygen saturation measurement and blood pressure measurement. A left upper extremity venogram demonstrated a patient left axillary and subclavian vein without obvious obstruction. Local anesthetic (sensorcaine) was delivered to the left pectoral region. An incision was made parallel to the deltopectoral groove. A subcutaneous pocket was created using blunt dissection and electrocautery, and adequate hemostasis was established. Access x 2 was obtained under fluoroscopic/ultrasound guidance using a modified Seldinger technique with placement of 2 wires down into the RA and advanced below the diaphragm.

## 2024-07-18 PROBLEM — Z51.89 ENCOUNTER FOR OTHER SPECIFIED AFTERCARE: Status: ACTIVE | Noted: 2024-07-02

## 2024-07-18 PROBLEM — Z78.9 DECREASED ACTIVITIES OF DAILY LIVING (ADL): Status: ACTIVE | Noted: 2024-07-18

## 2024-07-18 PROBLEM — R26.9 GAIT ABNORMALITY: Status: ACTIVE | Noted: 2024-07-18

## 2024-07-18 LAB
EKG ATRIAL RATE: 75 BPM
EKG DIAGNOSIS: NORMAL
EKG P AXIS: 41 DEGREES
EKG P-R INTERVAL: 224 MS
EKG Q-T INTERVAL: 426 MS
EKG QRS DURATION: 106 MS
EKG QTC CALCULATION (BAZETT): 475 MS
EKG R AXIS: 58 DEGREES
EKG T AXIS: 240 DEGREES
EKG VENTRICULAR RATE: 75 BPM
GLUCOSE BLD STRIP.AUTO-MCNC: 115 MG/DL (ref 65–100)
GLUCOSE BLD STRIP.AUTO-MCNC: 117 MG/DL (ref 65–100)
GLUCOSE BLD STRIP.AUTO-MCNC: 135 MG/DL (ref 65–100)
GLUCOSE BLD STRIP.AUTO-MCNC: 207 MG/DL (ref 65–100)
MAGNESIUM SERPL-MCNC: 1.8 MG/DL (ref 1.8–2.4)
POTASSIUM SERPL-SCNC: 3.7 MMOL/L (ref 3.5–5.1)
SERVICE CMNT-IMP: ABNORMAL

## 2024-07-18 PROCEDURE — 84132 ASSAY OF SERUM POTASSIUM: CPT

## 2024-07-18 PROCEDURE — 6370000000 HC RX 637 (ALT 250 FOR IP): Performed by: PHYSICIAN ASSISTANT

## 2024-07-18 PROCEDURE — 6360000002 HC RX W HCPCS: Performed by: INTERNAL MEDICINE

## 2024-07-18 PROCEDURE — 82962 GLUCOSE BLOOD TEST: CPT

## 2024-07-18 PROCEDURE — 99221 1ST HOSP IP/OBS SF/LOW 40: CPT | Performed by: STUDENT IN AN ORGANIZED HEALTH CARE EDUCATION/TRAINING PROGRAM

## 2024-07-18 PROCEDURE — 93005 ELECTROCARDIOGRAM TRACING: CPT | Performed by: PHYSICIAN ASSISTANT

## 2024-07-18 PROCEDURE — 99231 SBSQ HOSP IP/OBS SF/LOW 25: CPT | Performed by: INTERNAL MEDICINE

## 2024-07-18 PROCEDURE — 2140000001 HC CVICU INTERMEDIATE R&B

## 2024-07-18 PROCEDURE — 83735 ASSAY OF MAGNESIUM: CPT

## 2024-07-18 PROCEDURE — 36415 COLL VENOUS BLD VENIPUNCTURE: CPT

## 2024-07-18 PROCEDURE — 2580000003 HC RX 258: Performed by: PHYSICIAN ASSISTANT

## 2024-07-18 PROCEDURE — 99232 SBSQ HOSP IP/OBS MODERATE 35: CPT | Performed by: INTERNAL MEDICINE

## 2024-07-18 PROCEDURE — 6370000000 HC RX 637 (ALT 250 FOR IP): Performed by: INTERNAL MEDICINE

## 2024-07-18 PROCEDURE — 2580000003 HC RX 258: Performed by: INTERNAL MEDICINE

## 2024-07-18 PROCEDURE — 93010 ELECTROCARDIOGRAM REPORT: CPT | Performed by: INTERNAL MEDICINE

## 2024-07-18 RX ADMIN — OXYCODONE HYDROCHLORIDE AND ACETAMINOPHEN 1 TABLET: 5; 325 TABLET ORAL at 21:11

## 2024-07-18 RX ADMIN — POTASSIUM CHLORIDE 20 MEQ: 1500 TABLET, EXTENDED RELEASE ORAL at 09:32

## 2024-07-18 RX ADMIN — SODIUM CHLORIDE, PRESERVATIVE FREE 10 ML: 5 INJECTION INTRAVENOUS at 21:05

## 2024-07-18 RX ADMIN — FUROSEMIDE 40 MG: 40 TABLET ORAL at 09:33

## 2024-07-18 RX ADMIN — TRAMADOL HYDROCHLORIDE 50 MG: 50 TABLET ORAL at 04:29

## 2024-07-18 RX ADMIN — TRAMADOL HYDROCHLORIDE 50 MG: 50 TABLET ORAL at 11:19

## 2024-07-18 RX ADMIN — SODIUM CHLORIDE, PRESERVATIVE FREE 10 ML: 5 INJECTION INTRAVENOUS at 09:35

## 2024-07-18 RX ADMIN — Medication 1 AMPULE: at 09:32

## 2024-07-18 RX ADMIN — ACETAMINOPHEN 650 MG: 325 TABLET ORAL at 16:27

## 2024-07-18 RX ADMIN — INSULIN LISPRO 2 UNITS: 100 INJECTION, SOLUTION INTRAVENOUS; SUBCUTANEOUS at 21:05

## 2024-07-18 RX ADMIN — CEFAZOLIN 2000 MG: 10 INJECTION, POWDER, FOR SOLUTION INTRAVENOUS at 06:36

## 2024-07-18 RX ADMIN — ASPIRIN 81 MG: 81 TABLET, COATED ORAL at 09:32

## 2024-07-18 RX ADMIN — LOSARTAN POTASSIUM 25 MG: 25 TABLET, FILM COATED ORAL at 09:33

## 2024-07-18 RX ADMIN — FAMOTIDINE 20 MG: 20 TABLET, FILM COATED ORAL at 21:04

## 2024-07-18 RX ADMIN — ACETAMINOPHEN 650 MG: 325 TABLET ORAL at 09:32

## 2024-07-18 RX ADMIN — FAMOTIDINE 20 MG: 20 TABLET, FILM COATED ORAL at 09:33

## 2024-07-18 RX ADMIN — Medication 1 AMPULE: at 21:04

## 2024-07-18 RX ADMIN — OXYCODONE HYDROCHLORIDE AND ACETAMINOPHEN 1 TABLET: 5; 325 TABLET ORAL at 06:38

## 2024-07-18 RX ADMIN — CARVEDILOL 6.25 MG: 6.25 TABLET, FILM COATED ORAL at 09:33

## 2024-07-18 RX ADMIN — CARVEDILOL 6.25 MG: 6.25 TABLET, FILM COATED ORAL at 21:04

## 2024-07-18 RX ADMIN — POTASSIUM CHLORIDE 20 MEQ: 1500 TABLET, EXTENDED RELEASE ORAL at 21:04

## 2024-07-18 RX ADMIN — GUAIFENESIN 600 MG: 600 TABLET ORAL at 09:33

## 2024-07-18 RX ADMIN — ROSUVASTATIN CALCIUM 20 MG: 20 TABLET, FILM COATED ORAL at 21:04

## 2024-07-18 RX ADMIN — GUAIFENESIN 600 MG: 600 TABLET ORAL at 21:04

## 2024-07-18 RX ADMIN — LEVOTHYROXINE SODIUM 75 MCG: 0.07 TABLET ORAL at 06:38

## 2024-07-18 RX ADMIN — SODIUM CHLORIDE, PRESERVATIVE FREE 10 ML: 5 INJECTION INTRAVENOUS at 21:06

## 2024-07-18 NOTE — DISCHARGE SUMMARY
3:48 PM   Result Value Ref Range    POC Glucose 104 (H) 65 - 100 mg/dL    Performed by: Sangita    POCT Glucose    Collection Time: 07/22/24  7:29 PM   Result Value Ref Range    POC Glucose 121 (H) 65 - 100 mg/dL    Performed by: Bacilio    POCT Glucose    Collection Time: 07/23/24  6:16 AM   Result Value Ref Range    POC Glucose 110 (H) 65 - 100 mg/dL    Performed by: Bacilio          Patient Instructions:   Current Discharge Medication List        START taking these medications    Details   acetaminophen (TYLENOL) 325 MG tablet Take 2 tablets by mouth every 6 hours as needed for Pain  Qty: 120 tablet, Refills: 3  Start date: 7/23/2024      aspirin 81 MG EC tablet Take 1 tablet by mouth daily  Qty: 30 tablet, Refills: 3  Start date: 7/24/2024      carvedilol (COREG) 6.25 MG tablet Take 1 tablet by mouth 2 times daily  Qty: 60 tablet, Refills: 3  Start date: 7/23/2024      traMADol (ULTRAM) 50 MG tablet Take 1 tablet by mouth every 6 hours as needed for Pain for up to 5 days. Max Daily Amount: 200 mg  Qty: 20 tablet, Refills: 0  Start date: 7/23/2024, End date: 7/28/2024    Comments: Reduce doses taken as pain becomes manageable  Associated Diagnoses: S/P MVR (mitral valve repair); S/P AVR      valsartan (DIOVAN) 40 MG tablet Take 1 tablet by mouth daily  Qty: 30 tablet, Refills: 3  Start date: 7/23/2024           CONTINUE these medications which have CHANGED    Details   levothyroxine (SYNTHROID) 150 MCG tablet Take 0.5 tablets by mouth every morning (before breakfast)  Qty: 30 tablet, Refills: 3  Start date: 7/23/2024           CONTINUE these medications which have NOT CHANGED    Details   nitroGLYCERIN (NITROSTAT) 0.4 MG SL tablet Place 1 tablet under the tongue every 5 minutes as needed for Chest pain up to max of 3 total doses. If no relief after 1 dose, call 911.  Qty: 25 tablet, Refills: 3    Associated Diagnoses: ASCVD (arteriosclerotic cardiovascular disease)      DULoxetine

## 2024-07-18 NOTE — CARE COORDINATION
CM spoke to patient and her family at bedside today. CM informed patient that she has been accepted to [facility]. Patient stated that her first choice is [facility].     RAD spoke to Ariana at [facility] who stated she will start patient's insurance pre cert today.

## 2024-07-18 NOTE — DISCHARGE INSTRUCTIONS
All patients with prosthetic valves are considered among those at highest risk for endocarditis (infection of a heart valve).    It is important to maintain good oral health care with regular use of a manual or powered toothbrush, dental floss or other plaque-removing devices.     The risk of an infection on the heart valve is generally the highest with dental procedures which includes routine dental cleaning.   You will need to take antibiotics before most dental procedures or oral surgery.     You will also need antibiotics before procedures such as a having tonsils removed or procedures involving the lungs.     If you require surgery for a skin infection, you will also need antibiotics to prevent infection of a heart valve.     Please contact your cardiologist or primary care provider for further instructions.          The patient is being discharged home with home health services in stable condition on a low saturated fat, low cholesterol and low salt diet. The patient is instructed to advance activities as tolerated to the limit of fatigue or shortness of breath. The patient is instructed to avoid all heavy lifting or activities that strain the chest or upper arm muscles. Strenuous activity should be avoided. The patient is instructed to watch for signs of infection which include: increasing area of redness, fever or purulent drainage at the incision site. The patient is instructed to call the office or return to the ER for immediate evaluation for any shortness of breath or chest pain not relieved by NTG.

## 2024-07-19 LAB
GLUCOSE BLD STRIP.AUTO-MCNC: 112 MG/DL (ref 65–100)
GLUCOSE BLD STRIP.AUTO-MCNC: 116 MG/DL (ref 65–100)
GLUCOSE BLD STRIP.AUTO-MCNC: 129 MG/DL (ref 65–100)
GLUCOSE BLD STRIP.AUTO-MCNC: 150 MG/DL (ref 65–100)
POTASSIUM SERPL-SCNC: 3.5 MMOL/L (ref 3.5–5.1)
SERVICE CMNT-IMP: ABNORMAL

## 2024-07-19 PROCEDURE — 6370000000 HC RX 637 (ALT 250 FOR IP): Performed by: PHYSICIAN ASSISTANT

## 2024-07-19 PROCEDURE — 6370000000 HC RX 637 (ALT 250 FOR IP): Performed by: THORACIC SURGERY (CARDIOTHORACIC VASCULAR SURGERY)

## 2024-07-19 PROCEDURE — 99024 POSTOP FOLLOW-UP VISIT: CPT | Performed by: PHYSICIAN ASSISTANT

## 2024-07-19 PROCEDURE — 99232 SBSQ HOSP IP/OBS MODERATE 35: CPT | Performed by: INTERNAL MEDICINE

## 2024-07-19 PROCEDURE — 6370000000 HC RX 637 (ALT 250 FOR IP): Performed by: INTERNAL MEDICINE

## 2024-07-19 PROCEDURE — 97530 THERAPEUTIC ACTIVITIES: CPT

## 2024-07-19 PROCEDURE — 36415 COLL VENOUS BLD VENIPUNCTURE: CPT

## 2024-07-19 PROCEDURE — 2140000001 HC CVICU INTERMEDIATE R&B

## 2024-07-19 PROCEDURE — 2580000003 HC RX 258: Performed by: INTERNAL MEDICINE

## 2024-07-19 PROCEDURE — 84132 ASSAY OF SERUM POTASSIUM: CPT

## 2024-07-19 PROCEDURE — 2580000003 HC RX 258: Performed by: PHYSICIAN ASSISTANT

## 2024-07-19 PROCEDURE — 82962 GLUCOSE BLOOD TEST: CPT

## 2024-07-19 RX ORDER — LANOLIN ALCOHOL/MO/W.PET/CERES
6 CREAM (GRAM) TOPICAL NIGHTLY PRN
Status: DISCONTINUED | OUTPATIENT
Start: 2024-07-19 | End: 2024-07-23 | Stop reason: HOSPADM

## 2024-07-19 RX ADMIN — TRAMADOL HYDROCHLORIDE 50 MG: 50 TABLET ORAL at 09:02

## 2024-07-19 RX ADMIN — ASPIRIN 81 MG: 81 TABLET, COATED ORAL at 09:02

## 2024-07-19 RX ADMIN — LEVOTHYROXINE SODIUM 75 MCG: 0.07 TABLET ORAL at 06:17

## 2024-07-19 RX ADMIN — SODIUM CHLORIDE, PRESERVATIVE FREE 10 ML: 5 INJECTION INTRAVENOUS at 20:32

## 2024-07-19 RX ADMIN — POTASSIUM CHLORIDE 40 MEQ: 1500 TABLET, EXTENDED RELEASE ORAL at 09:03

## 2024-07-19 RX ADMIN — Medication 1 AMPULE: at 09:06

## 2024-07-19 RX ADMIN — GUAIFENESIN 600 MG: 600 TABLET ORAL at 09:02

## 2024-07-19 RX ADMIN — ROSUVASTATIN CALCIUM 20 MG: 20 TABLET, FILM COATED ORAL at 20:32

## 2024-07-19 RX ADMIN — INSULIN LISPRO 2 UNITS: 100 INJECTION, SOLUTION INTRAVENOUS; SUBCUTANEOUS at 13:45

## 2024-07-19 RX ADMIN — CARVEDILOL 6.25 MG: 6.25 TABLET, FILM COATED ORAL at 20:32

## 2024-07-19 RX ADMIN — FAMOTIDINE 20 MG: 20 TABLET, FILM COATED ORAL at 09:02

## 2024-07-19 RX ADMIN — GUAIFENESIN 600 MG: 600 TABLET ORAL at 20:32

## 2024-07-19 RX ADMIN — CARVEDILOL 6.25 MG: 6.25 TABLET, FILM COATED ORAL at 09:02

## 2024-07-19 RX ADMIN — Medication 1 AMPULE: at 20:36

## 2024-07-19 RX ADMIN — TRAMADOL HYDROCHLORIDE 50 MG: 50 TABLET ORAL at 20:32

## 2024-07-19 RX ADMIN — SODIUM CHLORIDE, PRESERVATIVE FREE 10 ML: 5 INJECTION INTRAVENOUS at 20:33

## 2024-07-19 RX ADMIN — SODIUM CHLORIDE, PRESERVATIVE FREE 10 ML: 5 INJECTION INTRAVENOUS at 09:00

## 2024-07-19 RX ADMIN — FAMOTIDINE 20 MG: 20 TABLET, FILM COATED ORAL at 20:32

## 2024-07-19 RX ADMIN — Medication 6 MG: at 20:32

## 2024-07-19 RX ADMIN — ACETAMINOPHEN 650 MG: 325 TABLET ORAL at 22:41

## 2024-07-19 RX ADMIN — LOSARTAN POTASSIUM 25 MG: 25 TABLET, FILM COATED ORAL at 09:02

## 2024-07-19 NOTE — CARE COORDINATION
Patient's insurance auth, to go to [facility] is still pending at this time. CM updated patient and patient's daughter.

## 2024-07-20 LAB
GLUCOSE BLD STRIP.AUTO-MCNC: 110 MG/DL (ref 65–100)
GLUCOSE BLD STRIP.AUTO-MCNC: 111 MG/DL (ref 65–100)
GLUCOSE BLD STRIP.AUTO-MCNC: 126 MG/DL (ref 65–100)
GLUCOSE BLD STRIP.AUTO-MCNC: 146 MG/DL (ref 65–100)
SERVICE CMNT-IMP: ABNORMAL

## 2024-07-20 PROCEDURE — 6370000000 HC RX 637 (ALT 250 FOR IP): Performed by: PHYSICIAN ASSISTANT

## 2024-07-20 PROCEDURE — 97530 THERAPEUTIC ACTIVITIES: CPT

## 2024-07-20 PROCEDURE — 2580000003 HC RX 258: Performed by: PHYSICIAN ASSISTANT

## 2024-07-20 PROCEDURE — 6370000000 HC RX 637 (ALT 250 FOR IP): Performed by: INTERNAL MEDICINE

## 2024-07-20 PROCEDURE — 2140000001 HC CVICU INTERMEDIATE R&B

## 2024-07-20 PROCEDURE — 82962 GLUCOSE BLOOD TEST: CPT

## 2024-07-20 PROCEDURE — 6370000000 HC RX 637 (ALT 250 FOR IP): Performed by: THORACIC SURGERY (CARDIOTHORACIC VASCULAR SURGERY)

## 2024-07-20 PROCEDURE — 97110 THERAPEUTIC EXERCISES: CPT

## 2024-07-20 PROCEDURE — 2580000003 HC RX 258: Performed by: INTERNAL MEDICINE

## 2024-07-20 RX ADMIN — Medication 6 MG: at 20:41

## 2024-07-20 RX ADMIN — GUAIFENESIN 600 MG: 600 TABLET ORAL at 10:50

## 2024-07-20 RX ADMIN — TRAMADOL HYDROCHLORIDE 50 MG: 50 TABLET ORAL at 22:28

## 2024-07-20 RX ADMIN — ACETAMINOPHEN 650 MG: 325 TABLET ORAL at 10:50

## 2024-07-20 RX ADMIN — Medication 1 AMPULE: at 10:50

## 2024-07-20 RX ADMIN — ACETAMINOPHEN 650 MG: 325 TABLET ORAL at 14:57

## 2024-07-20 RX ADMIN — ACETAMINOPHEN 650 MG: 325 TABLET ORAL at 06:33

## 2024-07-20 RX ADMIN — TRAMADOL HYDROCHLORIDE 50 MG: 50 TABLET ORAL at 04:04

## 2024-07-20 RX ADMIN — Medication 1 AMPULE: at 20:41

## 2024-07-20 RX ADMIN — OXYCODONE HYDROCHLORIDE AND ACETAMINOPHEN 1 TABLET: 5; 325 TABLET ORAL at 17:36

## 2024-07-20 RX ADMIN — SODIUM CHLORIDE, PRESERVATIVE FREE 10 ML: 5 INJECTION INTRAVENOUS at 20:42

## 2024-07-20 RX ADMIN — SODIUM CHLORIDE, PRESERVATIVE FREE 10 ML: 5 INJECTION INTRAVENOUS at 10:51

## 2024-07-20 RX ADMIN — CARVEDILOL 6.25 MG: 6.25 TABLET, FILM COATED ORAL at 20:41

## 2024-07-20 RX ADMIN — GUAIFENESIN 600 MG: 600 TABLET ORAL at 20:41

## 2024-07-20 RX ADMIN — LOSARTAN POTASSIUM 25 MG: 25 TABLET, FILM COATED ORAL at 10:50

## 2024-07-20 RX ADMIN — TRAMADOL HYDROCHLORIDE 50 MG: 50 TABLET ORAL at 12:18

## 2024-07-20 RX ADMIN — ROSUVASTATIN CALCIUM 20 MG: 20 TABLET, FILM COATED ORAL at 20:41

## 2024-07-20 RX ADMIN — SODIUM CHLORIDE, PRESERVATIVE FREE 10 ML: 5 INJECTION INTRAVENOUS at 20:41

## 2024-07-20 RX ADMIN — CARVEDILOL 6.25 MG: 6.25 TABLET, FILM COATED ORAL at 10:50

## 2024-07-20 RX ADMIN — LEVOTHYROXINE SODIUM 75 MCG: 0.07 TABLET ORAL at 06:33

## 2024-07-20 RX ADMIN — ASPIRIN 81 MG: 81 TABLET, COATED ORAL at 10:50

## 2024-07-20 RX ADMIN — FAMOTIDINE 20 MG: 20 TABLET, FILM COATED ORAL at 10:50

## 2024-07-20 RX ADMIN — ACETAMINOPHEN 650 MG: 325 TABLET ORAL at 20:41

## 2024-07-20 RX ADMIN — FAMOTIDINE 20 MG: 20 TABLET, FILM COATED ORAL at 20:41

## 2024-07-21 LAB
GLUCOSE BLD STRIP.AUTO-MCNC: 101 MG/DL (ref 65–100)
GLUCOSE BLD STRIP.AUTO-MCNC: 102 MG/DL (ref 65–100)
GLUCOSE BLD STRIP.AUTO-MCNC: 104 MG/DL (ref 65–100)
SERVICE CMNT-IMP: ABNORMAL

## 2024-07-21 PROCEDURE — 6370000000 HC RX 637 (ALT 250 FOR IP): Performed by: PHYSICIAN ASSISTANT

## 2024-07-21 PROCEDURE — 97530 THERAPEUTIC ACTIVITIES: CPT

## 2024-07-21 PROCEDURE — 97110 THERAPEUTIC EXERCISES: CPT

## 2024-07-21 PROCEDURE — 97112 NEUROMUSCULAR REEDUCATION: CPT

## 2024-07-21 PROCEDURE — 6370000000 HC RX 637 (ALT 250 FOR IP): Performed by: THORACIC SURGERY (CARDIOTHORACIC VASCULAR SURGERY)

## 2024-07-21 PROCEDURE — 2580000003 HC RX 258: Performed by: INTERNAL MEDICINE

## 2024-07-21 PROCEDURE — 2580000003 HC RX 258: Performed by: PHYSICIAN ASSISTANT

## 2024-07-21 PROCEDURE — 2140000001 HC CVICU INTERMEDIATE R&B

## 2024-07-21 PROCEDURE — 82962 GLUCOSE BLOOD TEST: CPT

## 2024-07-21 PROCEDURE — 97535 SELF CARE MNGMENT TRAINING: CPT

## 2024-07-21 RX ORDER — TRAZODONE HYDROCHLORIDE 50 MG/1
150 TABLET ORAL NIGHTLY PRN
Status: DISCONTINUED | OUTPATIENT
Start: 2024-07-21 | End: 2024-07-23 | Stop reason: HOSPADM

## 2024-07-21 RX ORDER — SENNA AND DOCUSATE SODIUM 50; 8.6 MG/1; MG/1
2 TABLET, FILM COATED ORAL 2 TIMES DAILY PRN
Status: DISCONTINUED | OUTPATIENT
Start: 2024-07-21 | End: 2024-07-23 | Stop reason: HOSPADM

## 2024-07-21 RX ADMIN — FAMOTIDINE 20 MG: 20 TABLET, FILM COATED ORAL at 10:03

## 2024-07-21 RX ADMIN — GUAIFENESIN 600 MG: 600 TABLET ORAL at 20:38

## 2024-07-21 RX ADMIN — LEVOTHYROXINE SODIUM 75 MCG: 0.07 TABLET ORAL at 05:56

## 2024-07-21 RX ADMIN — TRAMADOL HYDROCHLORIDE 50 MG: 50 TABLET ORAL at 06:03

## 2024-07-21 RX ADMIN — DOCUSATE SODIUM 50 MG AND SENNOSIDES 8.6 MG 2 TABLET: 8.6; 5 TABLET, FILM COATED ORAL at 10:03

## 2024-07-21 RX ADMIN — TRAZODONE HYDROCHLORIDE 150 MG: 50 TABLET ORAL at 20:38

## 2024-07-21 RX ADMIN — Medication 1 AMPULE: at 20:38

## 2024-07-21 RX ADMIN — OXYCODONE HYDROCHLORIDE AND ACETAMINOPHEN 1 TABLET: 5; 325 TABLET ORAL at 14:53

## 2024-07-21 RX ADMIN — SODIUM CHLORIDE, PRESERVATIVE FREE 10 ML: 5 INJECTION INTRAVENOUS at 20:38

## 2024-07-21 RX ADMIN — FAMOTIDINE 20 MG: 20 TABLET, FILM COATED ORAL at 20:38

## 2024-07-21 RX ADMIN — LOSARTAN POTASSIUM 25 MG: 25 TABLET, FILM COATED ORAL at 10:03

## 2024-07-21 RX ADMIN — GUAIFENESIN 600 MG: 600 TABLET ORAL at 10:02

## 2024-07-21 RX ADMIN — ROSUVASTATIN CALCIUM 20 MG: 20 TABLET, FILM COATED ORAL at 20:38

## 2024-07-21 RX ADMIN — CARVEDILOL 6.25 MG: 6.25 TABLET, FILM COATED ORAL at 20:38

## 2024-07-21 RX ADMIN — SODIUM CHLORIDE, PRESERVATIVE FREE 10 ML: 5 INJECTION INTRAVENOUS at 10:09

## 2024-07-21 RX ADMIN — ASPIRIN 81 MG: 81 TABLET, COATED ORAL at 10:02

## 2024-07-21 RX ADMIN — TRAMADOL HYDROCHLORIDE 50 MG: 50 TABLET ORAL at 20:38

## 2024-07-21 RX ADMIN — Medication 1 AMPULE: at 10:04

## 2024-07-21 RX ADMIN — OXYCODONE HYDROCHLORIDE AND ACETAMINOPHEN 1 TABLET: 5; 325 TABLET ORAL at 02:51

## 2024-07-21 RX ADMIN — CARVEDILOL 6.25 MG: 6.25 TABLET, FILM COATED ORAL at 10:02

## 2024-07-21 RX ADMIN — DOCUSATE SODIUM 50 MG AND SENNOSIDES 8.6 MG 2 TABLET: 8.6; 5 TABLET, FILM COATED ORAL at 20:38

## 2024-07-21 RX ADMIN — POTASSIUM CHLORIDE 20 MEQ: 1500 TABLET, EXTENDED RELEASE ORAL at 10:02

## 2024-07-21 RX ADMIN — OXYCODONE HYDROCHLORIDE AND ACETAMINOPHEN 1 TABLET: 5; 325 TABLET ORAL at 10:02

## 2024-07-22 LAB
GLUCOSE BLD STRIP.AUTO-MCNC: 102 MG/DL (ref 65–100)
GLUCOSE BLD STRIP.AUTO-MCNC: 104 MG/DL (ref 65–100)
GLUCOSE BLD STRIP.AUTO-MCNC: 115 MG/DL (ref 65–100)
GLUCOSE BLD STRIP.AUTO-MCNC: 121 MG/DL (ref 65–100)
GLUCOSE BLD STRIP.AUTO-MCNC: 142 MG/DL (ref 65–100)
SERVICE CMNT-IMP: ABNORMAL

## 2024-07-22 PROCEDURE — 99024 POSTOP FOLLOW-UP VISIT: CPT | Performed by: PHYSICIAN ASSISTANT

## 2024-07-22 PROCEDURE — 6360000002 HC RX W HCPCS: Performed by: INTERNAL MEDICINE

## 2024-07-22 PROCEDURE — 2140000001 HC CVICU INTERMEDIATE R&B

## 2024-07-22 PROCEDURE — 6370000000 HC RX 637 (ALT 250 FOR IP): Performed by: PHYSICIAN ASSISTANT

## 2024-07-22 PROCEDURE — 97164 PT RE-EVAL EST PLAN CARE: CPT

## 2024-07-22 PROCEDURE — 97530 THERAPEUTIC ACTIVITIES: CPT

## 2024-07-22 PROCEDURE — 2580000003 HC RX 258: Performed by: INTERNAL MEDICINE

## 2024-07-22 PROCEDURE — 6370000000 HC RX 637 (ALT 250 FOR IP): Performed by: INTERNAL MEDICINE

## 2024-07-22 PROCEDURE — 6370000000 HC RX 637 (ALT 250 FOR IP): Performed by: THORACIC SURGERY (CARDIOTHORACIC VASCULAR SURGERY)

## 2024-07-22 PROCEDURE — 82962 GLUCOSE BLOOD TEST: CPT

## 2024-07-22 RX ORDER — MAGNESIUM HYDROXIDE/ALUMINUM HYDROXICE/SIMETHICONE 120; 1200; 1200 MG/30ML; MG/30ML; MG/30ML
30 SUSPENSION ORAL EVERY 6 HOURS PRN
Status: DISCONTINUED | OUTPATIENT
Start: 2024-07-22 | End: 2024-07-23 | Stop reason: HOSPADM

## 2024-07-22 RX ADMIN — SODIUM CHLORIDE, PRESERVATIVE FREE 10 ML: 5 INJECTION INTRAVENOUS at 20:42

## 2024-07-22 RX ADMIN — ACETAMINOPHEN 650 MG: 325 TABLET ORAL at 13:21

## 2024-07-22 RX ADMIN — TRAZODONE HYDROCHLORIDE 150 MG: 50 TABLET ORAL at 20:42

## 2024-07-22 RX ADMIN — GUAIFENESIN 600 MG: 600 TABLET ORAL at 09:27

## 2024-07-22 RX ADMIN — ALUMINUM HYDROXIDE, MAGNESIUM HYDROXIDE, AND SIMETHICONE 30 ML: 1200; 120; 1200 SUSPENSION ORAL at 09:27

## 2024-07-22 RX ADMIN — Medication 1 AMPULE: at 09:27

## 2024-07-22 RX ADMIN — FAMOTIDINE 20 MG: 20 TABLET, FILM COATED ORAL at 20:42

## 2024-07-22 RX ADMIN — GUAIFENESIN 600 MG: 600 TABLET ORAL at 20:42

## 2024-07-22 RX ADMIN — ONDANSETRON 4 MG: 2 INJECTION INTRAMUSCULAR; INTRAVENOUS at 16:14

## 2024-07-22 RX ADMIN — ASPIRIN 81 MG: 81 TABLET, COATED ORAL at 09:31

## 2024-07-22 RX ADMIN — SODIUM CHLORIDE, PRESERVATIVE FREE 10 ML: 5 INJECTION INTRAVENOUS at 09:28

## 2024-07-22 RX ADMIN — Medication 1 AMPULE: at 20:41

## 2024-07-22 RX ADMIN — CARVEDILOL 6.25 MG: 6.25 TABLET, FILM COATED ORAL at 20:42

## 2024-07-22 RX ADMIN — LOSARTAN POTASSIUM 25 MG: 25 TABLET, FILM COATED ORAL at 09:30

## 2024-07-22 RX ADMIN — OXYCODONE HYDROCHLORIDE AND ACETAMINOPHEN 1 TABLET: 5; 325 TABLET ORAL at 06:05

## 2024-07-22 RX ADMIN — TRAMADOL HYDROCHLORIDE 50 MG: 50 TABLET ORAL at 20:45

## 2024-07-22 RX ADMIN — ACETAMINOPHEN 650 MG: 325 TABLET ORAL at 09:31

## 2024-07-22 RX ADMIN — FAMOTIDINE 20 MG: 20 TABLET, FILM COATED ORAL at 09:27

## 2024-07-22 RX ADMIN — OXYCODONE HYDROCHLORIDE AND ACETAMINOPHEN 1 TABLET: 5; 325 TABLET ORAL at 16:14

## 2024-07-22 RX ADMIN — ROSUVASTATIN CALCIUM 20 MG: 20 TABLET, FILM COATED ORAL at 20:42

## 2024-07-22 RX ADMIN — CARVEDILOL 6.25 MG: 6.25 TABLET, FILM COATED ORAL at 09:27

## 2024-07-22 RX ADMIN — LEVOTHYROXINE SODIUM 75 MCG: 0.07 TABLET ORAL at 06:05

## 2024-07-22 RX ADMIN — TRAMADOL HYDROCHLORIDE 50 MG: 50 TABLET ORAL at 03:32

## 2024-07-22 NOTE — CARE COORDINATION
Patient has insurance auth approval as of this morning for [facility]. Patient is waiting for an available bed to be discharged ASAP.  CM sent referral to [facility] in case [facility] does not have a bed available for patient tomorrow.

## 2024-07-22 NOTE — PLAN OF CARE
Problem: Behavior  Goal: Pt/Family maintain appropriate behavior and adhere to behavioral management agreement, if implemented  Description: INTERVENTIONS:  1. Assess patient/family's coping skills and  non-compliant behavior (including use of illegal substances)  2. Notify security of behavior or suspected illegal substances which indicate the need for search of the family and/or belongings  3. Encourage verbalization of thoughts and concerns in a socially appropriate manner  4. Utilize positive, consistent limit setting strategies supporting safety of patient, staff and others  5. Encourage participation in the decision making process about the behavioral management agreement  6. If a visitor's behavior poses a threat to safety call refer to organization policy.  7. Initiate consult with , Psychosocial CNS, Spiritual Care as appropriate  Outcome: Not Progressing  Flowsheets (Taken 7/2/2024 1930)  Patient/family maintains appropriate behavior and adheres to behavioral management agreement, if implemented: Assess patient/family’s coping skills and  non-compliant behavior (including use of illegal substances)     
  Problem: Discharge Planning  Goal: Discharge to home or other facility with appropriate resources  7/13/2024 2106 by Analilia Garnica RN  Outcome: Progressing  Flowsheets (Taken 7/13/2024 1930)  Discharge to home or other facility with appropriate resources: Identify barriers to discharge with patient and caregiver  7/13/2024 1414 by Maryjane Roman RN  Outcome: Progressing     Problem: Pain  Goal: Verbalizes/displays adequate comfort level or baseline comfort level  7/13/2024 2106 by Analilia Garnica RN  Outcome: Progressing  Flowsheets (Taken 7/13/2024 1930)  Verbalizes/displays adequate comfort level or baseline comfort level: Encourage patient to monitor pain and request assistance  7/13/2024 1414 by Maryjane Roman RN  Outcome: Progressing     Problem: Safety - Adult  Goal: Free from fall injury  7/13/2024 2106 by Analilia Garnica RN  Outcome: Progressing  7/13/2024 1414 by Maryjane Roman RN  Outcome: Progressing  Flowsheets (Taken 7/13/2024 0701)  Free From Fall Injury: Instruct family/caregiver on patient safety     Problem: Skin/Tissue Integrity  Goal: Absence of new skin breakdown  Description: 1.  Monitor for areas of redness and/or skin breakdown  2.  Assess vascular access sites hourly  3.  Every 4-6 hours minimum:  Change oxygen saturation probe site  4.  Every 4-6 hours:  If on nasal continuous positive airway pressure, respiratory therapy assess nares and determine need for appliance change or resting period.  7/13/2024 2106 by Analilia Garnica RN  Outcome: Progressing  7/13/2024 1414 by Maryjane Roman RN  Outcome: Progressing     Problem: Safety - Medical Restraint  Goal: Remains free of injury from restraints (Restraint for Interference with Medical Device)  Description: INTERVENTIONS:  1. Determine that other, less restrictive measures have been tried or would not be effective before applying the restraint  2. Evaluate the patient's condition at the time of restraint application  3. Inform 
  Problem: Discharge Planning  Goal: Discharge to home or other facility with appropriate resources  7/15/2024 0447 by Lakshmi Carroll RN  Outcome: Progressing  7/14/2024 1800 by Maryjane Roman RN  Outcome: Progressing     Problem: Pain  Goal: Verbalizes/displays adequate comfort level or baseline comfort level  7/15/2024 0447 by Lakshmi Carroll RN  Outcome: Progressing  Flowsheets (Taken 7/14/2024 2030)  Verbalizes/displays adequate comfort level or baseline comfort level: Encourage patient to monitor pain and request assistance  7/14/2024 1800 by Maryjane Roman RN  Outcome: Progressing     Problem: Safety - Adult  Goal: Free from fall injury  7/15/2024 0447 by Lakshmi Carroll RN  Outcome: Progressing  7/14/2024 1800 by Maryjane Roman RN  Outcome: Progressing  Flowsheets (Taken 7/14/2024 0715)  Free From Fall Injury: Instruct family/caregiver on patient safety     Problem: Skin/Tissue Integrity  Goal: Absence of new skin breakdown  Description: 1.  Monitor for areas of redness and/or skin breakdown  2.  Assess vascular access sites hourly  3.  Every 4-6 hours minimum:  Change oxygen saturation probe site  4.  Every 4-6 hours:  If on nasal continuous positive airway pressure, respiratory therapy assess nares and determine need for appliance change or resting period.  7/15/2024 0447 by Lakshmi Carroll RN  Outcome: Progressing  7/14/2024 1800 by Maryjane Roman RN  Outcome: Progressing     Problem: Safety - Medical Restraint  Goal: Remains free of injury from restraints (Restraint for Interference with Medical Device)  Description: INTERVENTIONS:  1. Determine that other, less restrictive measures have been tried or would not be effective before applying the restraint  2. Evaluate the patient's condition at the time of restraint application  3. Inform patient/family regarding the reason for restraint  4. Q2H: Monitor safety, psychosocial status, comfort, nutrition and hydration  7/15/2024 0447 by 
  Problem: Discharge Planning  Goal: Discharge to home or other facility with appropriate resources  7/19/2024 2159 by Mariya Andrews RN  Outcome: Progressing  Flowsheets (Taken 7/19/2024 1950)  Discharge to home or other facility with appropriate resources: Identify barriers to discharge with patient and caregiver  7/19/2024 1015 by Rhiannon Bain RN  Outcome: Progressing     Problem: Pain  Goal: Verbalizes/displays adequate comfort level or baseline comfort level  7/19/2024 2159 by Mariya Andrews RN  Outcome: Progressing  Flowsheets (Taken 7/19/2024 1950)  Verbalizes/displays adequate comfort level or baseline comfort level:   Encourage patient to monitor pain and request assistance   Assess pain using appropriate pain scale   Administer analgesics based on type and severity of pain and evaluate response   Implement non-pharmacological measures as appropriate and evaluate response  7/19/2024 1015 by Rhiannon Bain RN  Outcome: Progressing  Flowsheets (Taken 7/19/2024 0656)  Verbalizes/displays adequate comfort level or baseline comfort level:   Encourage patient to monitor pain and request assistance   Assess pain using appropriate pain scale     Problem: Safety - Adult  Goal: Free from fall injury  7/19/2024 2159 by Mariya Andrews RN  Outcome: Progressing  7/19/2024 1015 by Rhiannon Bain RN  Outcome: Progressing     Problem: Skin/Tissue Integrity  Goal: Absence of new skin breakdown  Description: 1.  Monitor for areas of redness and/or skin breakdown  2.  Assess vascular access sites hourly  3.  Every 4-6 hours minimum:  Change oxygen saturation probe site  4.  Every 4-6 hours:  If on nasal continuous positive airway pressure, respiratory therapy assess nares and determine need for appliance change or resting period.  7/19/2024 2159 by Mariya Andrews RN  Outcome: Progressing  7/19/2024 1015 by Rhiannon Bain RN  Outcome: Progressing     Problem: Safety - Medical Restraint  Goal: Remains free of 
  Problem: Discharge Planning  Goal: Discharge to home or other facility with appropriate resources  7/20/2024 1041 by Rhiannon Bain RN  Outcome: Progressing  7/19/2024 2159 by Mariya Andrews RN  Outcome: Progressing  Flowsheets (Taken 7/19/2024 1950)  Discharge to home or other facility with appropriate resources: Identify barriers to discharge with patient and caregiver     Problem: Pain  Goal: Verbalizes/displays adequate comfort level or baseline comfort level  7/20/2024 1041 by Rhiannon Bain RN  Outcome: Progressing  Flowsheets (Taken 7/20/2024 0703)  Verbalizes/displays adequate comfort level or baseline comfort level:   Encourage patient to monitor pain and request assistance   Assess pain using appropriate pain scale  7/19/2024 2159 by Mariya Andrews RN  Outcome: Progressing  Flowsheets (Taken 7/19/2024 1950)  Verbalizes/displays adequate comfort level or baseline comfort level:   Encourage patient to monitor pain and request assistance   Assess pain using appropriate pain scale   Administer analgesics based on type and severity of pain and evaluate response   Implement non-pharmacological measures as appropriate and evaluate response     Problem: Safety - Adult  Goal: Free from fall injury  7/20/2024 1041 by Rhiannon Bain RN  Outcome: Progressing  7/19/2024 2159 by Mariya Andrews RN  Outcome: Progressing     Problem: Skin/Tissue Integrity  Goal: Absence of new skin breakdown  Description: 1.  Monitor for areas of redness and/or skin breakdown  2.  Assess vascular access sites hourly  3.  Every 4-6 hours minimum:  Change oxygen saturation probe site  4.  Every 4-6 hours:  If on nasal continuous positive airway pressure, respiratory therapy assess nares and determine need for appliance change or resting period.  7/20/2024 1041 by Rhiannon Bain RN  Outcome: Progressing  7/19/2024 2159 by Mariya Andrews RN  Outcome: Progressing     Problem: Safety - Medical Restraint  Goal: Remains free of 
  Problem: Discharge Planning  Goal: Discharge to home or other facility with appropriate resources  7/21/2024 2326 by Mariya Andrews RN  Outcome: Progressing  Flowsheets (Taken 7/21/2024 1920)  Discharge to home or other facility with appropriate resources: Identify barriers to discharge with patient and caregiver  7/21/2024 1046 by Rhiannon Bain RN  Outcome: Progressing  7/21/2024 1045 by Rhiannon Bain RN  Outcome: Progressing  Flowsheets (Taken 7/21/2024 0730)  Discharge to home or other facility with appropriate resources: Identify barriers to discharge with patient and caregiver     Problem: Pain  Goal: Verbalizes/displays adequate comfort level or baseline comfort level  7/21/2024 2326 by Mariya Andrews RN  Outcome: Progressing  7/21/2024 1046 by Rhiannon Bain RN  Outcome: Progressing  7/21/2024 1045 by Rhiannon Bain RN  Outcome: Progressing  Flowsheets (Taken 7/21/2024 0730)  Verbalizes/displays adequate comfort level or baseline comfort level:   Encourage patient to monitor pain and request assistance   Assess pain using appropriate pain scale     Problem: Safety - Adult  Goal: Free from fall injury  7/21/2024 2326 by Mariya Andrews RN  Outcome: Progressing  7/21/2024 1046 by Rhiannon Bain RN  Outcome: Progressing  7/21/2024 1045 by Rhiannon Bain RN  Outcome: Progressing     Problem: Skin/Tissue Integrity  Goal: Absence of new skin breakdown  Description: 1.  Monitor for areas of redness and/or skin breakdown  2.  Assess vascular access sites hourly  3.  Every 4-6 hours minimum:  Change oxygen saturation probe site  4.  Every 4-6 hours:  If on nasal continuous positive airway pressure, respiratory therapy assess nares and determine need for appliance change or resting period.  7/21/2024 2326 by Mariya Andrews RN  Outcome: Progressing  7/21/2024 1046 by Rhiannon Bain RN  Outcome: Progressing  7/21/2024 1045 by Rhiannon Bain RN  Outcome: Progressing     Problem: Safety - Medical 
  Problem: Discharge Planning  Goal: Discharge to home or other facility with appropriate resources  7/3/2024 1140 by Tashia Palacio RN  Outcome: Progressing  Flowsheets (Taken 7/3/2024 0831)  Discharge to home or other facility with appropriate resources:   Identify barriers to discharge with patient and caregiver   Arrange for needed discharge resources and transportation as appropriate   Identify discharge learning needs (meds, wound care, etc)  7/2/2024 2229 by Tiana Rodriguez RN  Outcome: Progressing  Flowsheets (Taken 7/2/2024 1930)  Discharge to home or other facility with appropriate resources: Identify barriers to discharge with patient and caregiver     Problem: Pain  Goal: Verbalizes/displays adequate comfort level or baseline comfort level  7/3/2024 1140 by Tashia Palacio RN  Outcome: Progressing  7/2/2024 2229 by Tiana Rodriguez RN  Outcome: Progressing  Flowsheets (Taken 7/2/2024 1930)  Verbalizes/displays adequate comfort level or baseline comfort level: Encourage patient to monitor pain and request assistance     Problem: Safety - Adult  Goal: Free from fall injury  7/3/2024 1140 by Tashia Palacio RN  Outcome: Progressing  Flowsheets (Taken 7/3/2024 0831)  Free From Fall Injury: Instruct family/caregiver on patient safety  7/2/2024 2229 by Tiana Rodriguez RN  Outcome: Progressing     Problem: Skin/Tissue Integrity  Goal: Absence of new skin breakdown  Description: 1.  Monitor for areas of redness and/or skin breakdown  2.  Assess vascular access sites hourly  3.  Every 4-6 hours minimum:  Change oxygen saturation probe site  4.  Every 4-6 hours:  If on nasal continuous positive airway pressure, respiratory therapy assess nares and determine need for appliance change or resting period.  7/3/2024 1140 by Tashia Palacio RN  Outcome: Progressing  7/2/2024 2229 by Tiana Rodriguez RN  Outcome: Progressing     Problem: Safety - Medical Restraint  Goal: Remains free of injury from restraints (Restraint for 
  Problem: Discharge Planning  Goal: Discharge to home or other facility with appropriate resources  7/3/2024 2344 by Martha Morales RN  Outcome: Progressing  7/3/2024 1140 by Tashia Palacio RN  Outcome: Progressing  Flowsheets (Taken 7/3/2024 0831)  Discharge to home or other facility with appropriate resources:   Identify barriers to discharge with patient and caregiver   Arrange for needed discharge resources and transportation as appropriate   Identify discharge learning needs (meds, wound care, etc)     Problem: Pain  Goal: Verbalizes/displays adequate comfort level or baseline comfort level  7/3/2024 2344 by Martha Morales RN  Outcome: Progressing  7/3/2024 1140 by Tashia Palacio RN  Outcome: Progressing     Problem: Safety - Adult  Goal: Free from fall injury  7/3/2024 2344 by Martha Morales RN  Outcome: Progressing  7/3/2024 1140 by Tashia Palacio RN  Outcome: Progressing  Flowsheets (Taken 7/3/2024 0831)  Free From Fall Injury: Instruct family/caregiver on patient safety     Problem: Skin/Tissue Integrity  Goal: Absence of new skin breakdown  Description: 1.  Monitor for areas of redness and/or skin breakdown  2.  Assess vascular access sites hourly  3.  Every 4-6 hours minimum:  Change oxygen saturation probe site  4.  Every 4-6 hours:  If on nasal continuous positive airway pressure, respiratory therapy assess nares and determine need for appliance change or resting period.  7/3/2024 2344 by Martha Morales RN  Outcome: Progressing  7/3/2024 1140 by Tashia Palacio RN  Outcome: Progressing     Problem: Safety - Medical Restraint  Goal: Remains free of injury from restraints (Restraint for Interference with Medical Device)  Description: INTERVENTIONS:  1. Determine that other, less restrictive measures have been tried or would not be effective before applying the restraint  2. Evaluate the patient's condition at the time of restraint application  3. Inform patient/family regarding 
  Problem: Discharge Planning  Goal: Discharge to home or other facility with appropriate resources  Outcome: Progressing     Problem: Pain  Goal: Verbalizes/displays adequate comfort level or baseline comfort level  Outcome: Progressing     Problem: Safety - Adult  Goal: Free from fall injury  Outcome: Progressing     Problem: Skin/Tissue Integrity  Goal: Absence of new skin breakdown  Description: 1.  Monitor for areas of redness and/or skin breakdown  2.  Assess vascular access sites hourly  3.  Every 4-6 hours minimum:  Change oxygen saturation probe site  4.  Every 4-6 hours:  If on nasal continuous positive airway pressure, respiratory therapy assess nares and determine need for appliance change or resting period.  Outcome: Progressing     Problem: Safety - Medical Restraint  Goal: Remains free of injury from restraints (Restraint for Interference with Medical Device)  Description: INTERVENTIONS:  1. Determine that other, less restrictive measures have been tried or would not be effective before applying the restraint  2. Evaluate the patient's condition at the time of restraint application  3. Inform patient/family regarding the reason for restraint  4. Q2H: Monitor safety, psychosocial status, comfort, nutrition and hydration  Outcome: Progressing     Problem: Respiratory - Adult  Goal: Achieves optimal ventilation and oxygenation  Outcome: Progressing     Problem: Cardiovascular - Adult  Goal: Maintains optimal cardiac output and hemodynamic stability  Outcome: Progressing  Goal: Absence of cardiac dysrhythmias or at baseline  Outcome: Progressing     Problem: Musculoskeletal - Adult  Goal: Return mobility to safest level of function  Outcome: Progressing  Flowsheets (Taken 7/5/2024 2112)  Return Mobility to Safest Level of Function:   Assess patient stability and activity tolerance for standing, transferring and ambulating with or without assistive devices   Assist with transfers and ambulation using safe 
  Problem: Discharge Planning  Goal: Discharge to home or other facility with appropriate resources  Outcome: Progressing     Problem: Pain  Goal: Verbalizes/displays adequate comfort level or baseline comfort level  Outcome: Progressing     Problem: Safety - Adult  Goal: Free from fall injury  Outcome: Progressing     Problem: Skin/Tissue Integrity  Goal: Absence of new skin breakdown  Description: 1.  Monitor for areas of redness and/or skin breakdown  2.  Assess vascular access sites hourly  3.  Every 4-6 hours minimum:  Change oxygen saturation probe site  4.  Every 4-6 hours:  If on nasal continuous positive airway pressure, respiratory therapy assess nares and determine need for appliance change or resting period.  Outcome: Progressing     Problem: Safety - Medical Restraint  Goal: Remains free of injury from restraints (Restraint for Interference with Medical Device)  Description: INTERVENTIONS:  1. Determine that other, less restrictive measures have been tried or would not be effective before applying the restraint  2. Evaluate the patient's condition at the time of restraint application  3. Inform patient/family regarding the reason for restraint  4. Q2H: Monitor safety, psychosocial status, comfort, nutrition and hydration  Outcome: Progressing     Problem: Respiratory - Adult  Goal: Achieves optimal ventilation and oxygenation  Outcome: Progressing     Problem: Cardiovascular - Adult  Goal: Maintains optimal cardiac output and hemodynamic stability  Outcome: Progressing  Goal: Absence of cardiac dysrhythmias or at baseline  Outcome: Progressing     Problem: Musculoskeletal - Adult  Goal: Return mobility to safest level of function  Outcome: Progressing  Goal: Maintain proper alignment of affected body part  Outcome: Progressing  Goal: Return ADL status to a safe level of function  Outcome: Progressing     Problem: Genitourinary - Adult  Goal: Urinary catheter remains patent  Outcome: Progressing   
  Problem: Discharge Planning  Goal: Discharge to home or other facility with appropriate resources  Outcome: Progressing  Flowsheets (Taken 7/10/2024 0830)  Discharge to home or other facility with appropriate resources: Identify barriers to discharge with patient and caregiver     Problem: Pain  Goal: Verbalizes/displays adequate comfort level or baseline comfort level  Outcome: Progressing  Flowsheets (Taken 7/10/2024 0830)  Verbalizes/displays adequate comfort level or baseline comfort level: Encourage patient to monitor pain and request assistance     Problem: Safety - Adult  Goal: Free from fall injury  Outcome: Progressing     Problem: Skin/Tissue Integrity  Goal: Absence of new skin breakdown  Description: 1.  Monitor for areas of redness and/or skin breakdown  2.  Assess vascular access sites hourly  3.  Every 4-6 hours minimum:  Change oxygen saturation probe site  4.  Every 4-6 hours:  If on nasal continuous positive airway pressure, respiratory therapy assess nares and determine need for appliance change or resting period.  Outcome: Progressing     Problem: Safety - Medical Restraint  Goal: Remains free of injury from restraints (Restraint for Interference with Medical Device)  Description: INTERVENTIONS:  1. Determine that other, less restrictive measures have been tried or would not be effective before applying the restraint  2. Evaluate the patient's condition at the time of restraint application  3. Inform patient/family regarding the reason for restraint  4. Q2H: Monitor safety, psychosocial status, comfort, nutrition and hydration  Outcome: Progressing     Problem: Respiratory - Adult  Goal: Achieves optimal ventilation and oxygenation  Outcome: Progressing  Flowsheets (Taken 7/10/2024 0830)  Achieves optimal ventilation and oxygenation: Assess for changes in respiratory status     Problem: Cardiovascular - Adult  Goal: Maintains optimal cardiac output and hemodynamic stability  Outcome: 
  Problem: Discharge Planning  Goal: Discharge to home or other facility with appropriate resources  Outcome: Progressing  Flowsheets (Taken 7/11/2024 1257)  Discharge to home or other facility with appropriate resources:   Identify barriers to discharge with patient and caregiver   Arrange for needed discharge resources and transportation as appropriate   Identify discharge learning needs (meds, wound care, etc)     Problem: Pain  Goal: Verbalizes/displays adequate comfort level or baseline comfort level  Outcome: Progressing  Flowsheets (Taken 7/11/2024 1257)  Verbalizes/displays adequate comfort level or baseline comfort level: Assess pain using appropriate pain scale     Problem: Safety - Adult  Goal: Free from fall injury  Outcome: Progressing  Flowsheets (Taken 7/11/2024 1315)  Free From Fall Injury: Instruct family/caregiver on patient safety     Problem: Skin/Tissue Integrity  Goal: Absence of new skin breakdown  Description: 1.  Monitor for areas of redness and/or skin breakdown  2.  Assess vascular access sites hourly  3.  Every 4-6 hours minimum:  Change oxygen saturation probe site  4.  Every 4-6 hours:  If on nasal continuous positive airway pressure, respiratory therapy assess nares and determine need for appliance change or resting period.  Outcome: Progressing     Problem: Respiratory - Adult  Goal: Achieves optimal ventilation and oxygenation  Recent Flowsheet Documentation  Taken 7/11/2024 1315 by Tereza Garnica, RN  Achieves optimal ventilation and oxygenation:   Assess for changes in respiratory status   Assess for changes in mentation and behavior  Taken 7/11/2024 1257 by Tereza Garnica, RN  Achieves optimal ventilation and oxygenation:   Assess for changes in respiratory status   Assess for changes in mentation and behavior   Position to facilitate oxygenation and minimize respiratory effort   Oxygen supplementation based on oxygen saturation or arterial blood gases     Problem: 
  Problem: Discharge Planning  Goal: Discharge to home or other facility with appropriate resources  Outcome: Progressing  Flowsheets (Taken 7/18/2024 1058)  Discharge to home or other facility with appropriate resources:   Identify barriers to discharge with patient and caregiver   Arrange for needed discharge resources and transportation as appropriate   Identify discharge learning needs (meds, wound care, etc)   Refer to discharge planning if patient needs post-hospital services based on physician order or complex needs related to functional status, cognitive ability or social support system     Problem: Pain  Goal: Verbalizes/displays adequate comfort level or baseline comfort level  Outcome: Progressing  Flowsheets (Taken 7/18/2024 1058)  Verbalizes/displays adequate comfort level or baseline comfort level:   Encourage patient to monitor pain and request assistance   Assess pain using appropriate pain scale   Administer analgesics based on type and severity of pain and evaluate response   Implement non-pharmacological measures as appropriate and evaluate response   Notify Licensed Independent Practitioner if interventions unsuccessful or patient reports new pain     Problem: Safety - Adult  Goal: Free from fall injury  Outcome: Progressing  Flowsheets (Taken 7/18/2024 1058)  Free From Fall Injury: Instruct family/caregiver on patient safety     
  Problem: Discharge Planning  Goal: Discharge to home or other facility with appropriate resources  Recent Flowsheet Documentation  Taken 7/11/2024 1901 by Mitchel Shook, RN  Discharge to home or other facility with appropriate resources:   Identify barriers to discharge with patient and caregiver   Arrange for needed discharge resources and transportation as appropriate   Identify discharge learning needs (meds, wound care, etc)   Refer to discharge planning if patient needs post-hospital services based on physician order or complex needs related to functional status, cognitive ability or social support system   Arrange for interpreters to assist at discharge as needed  7/11/2024 1606 by Tereza Garnica RN  Outcome: Progressing  Flowsheets (Taken 7/11/2024 1257)  Discharge to home or other facility with appropriate resources:   Identify barriers to discharge with patient and caregiver   Arrange for needed discharge resources and transportation as appropriate   Identify discharge learning needs (meds, wound care, etc)     Problem: Pain  Goal: Verbalizes/displays adequate comfort level or baseline comfort level  Recent Flowsheet Documentation  Taken 7/11/2024 1901 by Mitchel Shook RN  Verbalizes/displays adequate comfort level or baseline comfort level:   Encourage patient to monitor pain and request assistance   Assess pain using appropriate pain scale   Administer analgesics based on type and severity of pain and evaluate response   Implement non-pharmacological measures as appropriate and evaluate response   Consider cultural and social influences on pain and pain management   Notify Licensed Independent Practitioner if interventions unsuccessful or patient reports new pain  7/11/2024 1606 by Tereza Garinca RN  Outcome: Progressing  Flowsheets (Taken 7/11/2024 1257)  Verbalizes/displays adequate comfort level or baseline comfort level: Assess pain using appropriate pain scale     Problem: Safety - 
  Problem: Metabolic/Fluid and Electrolytes - Adult  Goal: Electrolytes maintained within normal limits  Outcome: Not Progressing     Problem: Discharge Planning  Goal: Discharge to home or other facility with appropriate resources  Outcome: Progressing     Problem: Pain  Goal: Verbalizes/displays adequate comfort level or baseline comfort level  Outcome: Progressing     Problem: Safety - Adult  Goal: Free from fall injury  Outcome: Progressing     Problem: Skin/Tissue Integrity  Goal: Absence of new skin breakdown  Description: 1.  Monitor for areas of redness and/or skin breakdown  2.  Assess vascular access sites hourly  3.  Every 4-6 hours minimum:  Change oxygen saturation probe site  4.  Every 4-6 hours:  If on nasal continuous positive airway pressure, respiratory therapy assess nares and determine need for appliance change or resting period.  Outcome: Progressing     Problem: Safety - Medical Restraint  Goal: Remains free of injury from restraints (Restraint for Interference with Medical Device)  Description: INTERVENTIONS:  1. Determine that other, less restrictive measures have been tried or would not be effective before applying the restraint  2. Evaluate the patient's condition at the time of restraint application  3. Inform patient/family regarding the reason for restraint  4. Q2H: Monitor safety, psychosocial status, comfort, nutrition and hydration  Outcome: Progressing     Problem: Respiratory - Adult  Goal: Achieves optimal ventilation and oxygenation  Outcome: Progressing     Problem: Cardiovascular - Adult  Goal: Maintains optimal cardiac output and hemodynamic stability  Outcome: Progressing  Goal: Absence of cardiac dysrhythmias or at baseline  Outcome: Progressing     Problem: Musculoskeletal - Adult  Goal: Return mobility to safest level of function  Outcome: Progressing  Goal: Maintain proper alignment of affected body part  Outcome: Progressing  Goal: Return ADL status to a safe level of 
  Problem: Pain  Goal: Verbalizes/displays adequate comfort level or baseline comfort level  Outcome: Progressing     Problem: Safety - Adult  Goal: Free from fall injury  Outcome: Progressing     Problem: Skin/Tissue Integrity  Goal: Absence of new skin breakdown  Description: 1.  Monitor for areas of redness and/or skin breakdown  2.  Assess vascular access sites hourly  3.  Every 4-6 hours minimum:  Change oxygen saturation probe site  4.  Every 4-6 hours:  If on nasal continuous positive airway pressure, respiratory therapy assess nares and determine need for appliance change or resting period.  Outcome: Progressing     Problem: Discharge Planning  Goal: Discharge to home or other facility with appropriate resources  Outcome: Progressing     Problem: Safety - Medical Restraint  Goal: Remains free of injury from restraints (Restraint for Interference with Medical Device)  Description: INTERVENTIONS:  1. Determine that other, less restrictive measures have been tried or would not be effective before applying the restraint  2. Evaluate the patient's condition at the time of restraint application  3. Inform patient/family regarding the reason for restraint  4. Q2H: Monitor safety, psychosocial status, comfort, nutrition and hydration  Outcome: Progressing     Problem: Respiratory - Adult  Goal: Achieves optimal ventilation and oxygenation  Outcome: Progressing     Problem: Cardiovascular - Adult  Goal: Maintains optimal cardiac output and hemodynamic stability  Outcome: Progressing  Goal: Absence of cardiac dysrhythmias or at baseline  Outcome: Progressing     Problem: Musculoskeletal - Adult  Goal: Return mobility to safest level of function  Outcome: Progressing  Goal: Maintain proper alignment of affected body part  Outcome: Progressing  Goal: Return ADL status to a safe level of function  Outcome: Progressing     Problem: Genitourinary - Adult  Goal: Urinary catheter remains patent  Outcome: Progressing   
Problem: Discharge Planning  Goal: Discharge to home or other facility with appropriate resources  Outcome: Progressing     Problem: Pain  Goal: Verbalizes/displays adequate comfort level or baseline comfort level  Outcome: Progressing     Problem: Safety - Adult  Goal: Free from fall injury  Outcome: Progressing  Flowsheets (Taken 7/13/2024 0701)  Free From Fall Injury: Instruct family/caregiver on patient safety     Problem: Skin/Tissue Integrity  Goal: Absence of new skin breakdown  Description: 1.  Monitor for areas of redness and/or skin breakdown  2.  Assess vascular access sites hourly  3.  Every 4-6 hours minimum:  Change oxygen saturation probe site  4.  Every 4-6 hours:  If on nasal continuous positive airway pressure, respiratory therapy assess nares and determine need for appliance change or resting period.  Outcome: Progressing     Problem: Safety - Medical Restraint  Goal: Remains free of injury from restraints (Restraint for Interference with Medical Device)  Description: INTERVENTIONS:  1. Determine that other, less restrictive measures have been tried or would not be effective before applying the restraint  2. Evaluate the patient's condition at the time of restraint application  3. Inform patient/family regarding the reason for restraint  4. Q2H: Monitor safety, psychosocial status, comfort, nutrition and hydration  Outcome: Progressing     Problem: Respiratory - Adult  Goal: Achieves optimal ventilation and oxygenation  Outcome: Progressing     Problem: Cardiovascular - Adult  Goal: Maintains optimal cardiac output and hemodynamic stability  Outcome: Progressing  Flowsheets (Taken 7/13/2024 0701)  Maintains optimal cardiac output and hemodynamic stability:   Monitor blood pressure and heart rate   Monitor urine output and notify Licensed Independent Practitioner for values outside of normal range   Assess for signs of decreased cardiac output   Administer fluid and/or volume expanders as ordered   
1606 by Angelia Stern RN  Outcome: Not Progressing  Goal: Hemodynamic stability and optimal renal function maintained  7/4/2024 2236 by Martha Morales RN  Outcome: Progressing  7/4/2024 1606 by Angelia Stern RN  Outcome: Progressing  Goal: Glucose maintained within prescribed range  7/4/2024 2236 by Martha Morales RN  Outcome: Progressing  7/4/2024 1606 by Angelia Stern RN  Outcome: Progressing     Problem: Neurosensory - Adult  Goal: Achieves stable or improved neurological status  7/4/2024 2236 by Martha Morales RN  Outcome: Progressing  7/4/2024 1606 by Angelia Stern RN  Outcome: Progressing     Problem: Skin/Tissue Integrity - Adult  Goal: Skin integrity remains intact  7/4/2024 2236 by Martha Morales RN  Outcome: Progressing  7/4/2024 1606 by Angelia Stern RN  Outcome: Progressing  Flowsheets (Taken 7/4/2024 0835)  Skin Integrity Remains Intact: Monitor for areas of redness and/or skin breakdown     Problem: Gastrointestinal - Adult  Goal: Minimal or absence of nausea and vomiting  7/4/2024 2236 by Martha Morales RN  Outcome: Progressing  7/4/2024 1606 by Angelia Stern RN  Outcome: Progressing  Goal: Maintains or returns to baseline bowel function  7/4/2024 2236 by Martha Morales RN  Outcome: Progressing  7/4/2024 1606 by Angelia Stern RN  Outcome: Progressing     Problem: Behavior  Goal: Pt/Family maintain appropriate behavior and adhere to behavioral management agreement, if implemented  Description: INTERVENTIONS:  1. Assess patient/family's coping skills and  non-compliant behavior (including use of illegal substances)  2. Notify security of behavior or suspected illegal substances which indicate the need for search of the family and/or belongings  3. Encourage verbalization of thoughts and concerns in a socially appropriate manner  4. Utilize positive, consistent limit setting strategies supporting safety of patient, staff and others  5. Encourage 
making process about the behavioral management agreement  6. If a visitor's behavior poses a threat to safety call refer to organization policy.  7. Initiate consult with , Psychosocial CNS, Spiritual Care as appropriate  7/7/2024 0135 by Sapphire Henry RN  Outcome: Progressing  7/6/2024 1847 by Jeanine Marrero RN  Outcome: Progressing     Problem: ABCDS Injury Assessment  Goal: Absence of physical injury  7/7/2024 0135 by Sapphire Henry RN  Outcome: Progressing  7/6/2024 1847 by Jeanine Marrero RN  Outcome: Progressing     
  Problem: ABCDS Injury Assessment  Goal: Absence of physical injury  7/7/2024 1119 by Felisa Urbina, RN  Outcome: Progressing  7/7/2024 0135 by Sapphire Henry, RN  Outcome: Progressing     
IV fluids as ordered to ensure adequate hydration  Goal: Maintains or returns to baseline bowel function  7/19/2024 1015 by Rhiannon Bain RN  Outcome: Progressing  7/19/2024 0247 by Cande Riddle, RN  Outcome: Progressing  Flowsheets (Taken 7/18/2024 1915)  Maintains or returns to baseline bowel function: Assess bowel function     Problem: Behavior  Goal: Pt/Family maintain appropriate behavior and adhere to behavioral management agreement, if implemented  Description: INTERVENTIONS:  1. Assess patient/family's coping skills and  non-compliant behavior (including use of illegal substances)  2. Notify security of behavior or suspected illegal substances which indicate the need for search of the family and/or belongings  3. Encourage verbalization of thoughts and concerns in a socially appropriate manner  4. Utilize positive, consistent limit setting strategies supporting safety of patient, staff and others  5. Encourage participation in the decision making process about the behavioral management agreement  6. If a visitor's behavior poses a threat to safety call refer to organization policy.  7. Initiate consult with , Psychosocial CNS, Spiritual Care as appropriate  7/19/2024 1015 by Rhiannon Bain, RN  Outcome: Progressing  7/19/2024 0247 by Cande Riddle, RN  Outcome: Progressing  Flowsheets (Taken 7/18/2024 1915)  Patient/family maintains appropriate behavior and adheres to behavioral management agreement, if implemented: Assess patient/family’s coping skills and  non-compliant behavior (including use of illegal substances)     Problem: ABCDS Injury Assessment  Goal: Absence of physical injury  7/19/2024 1015 by Rhiannon Bain, RN  Outcome: Progressing  7/19/2024 0247 by Cande Riddle, RN  Outcome: Progressing  Flowsheets (Taken 7/18/2024 2156)  Absence of Physical Injury: Implement safety measures based on patient assessment     
Progressing  Flowsheets (Taken 7/20/2024 1912)  Skin Integrity Remains Intact: Monitor for areas of redness and/or skin breakdown  7/20/2024 1041 by Rhiannon Bain RN  Outcome: Progressing  Flowsheets (Taken 7/20/2024 0703)  Skin Integrity Remains Intact: Monitor for areas of redness and/or skin breakdown     Problem: Gastrointestinal - Adult  Goal: Minimal or absence of nausea and vomiting  7/20/2024 2132 by Mariya Andrews RN  Outcome: Progressing  Flowsheets (Taken 7/20/2024 1912)  Minimal or absence of nausea and vomiting: Administer ordered antiemetic medications as needed  7/20/2024 1041 by Rhiannon Bain RN  Outcome: Progressing  Goal: Maintains or returns to baseline bowel function  7/20/2024 2132 by Mariya Andrews RN  Outcome: Progressing  Flowsheets (Taken 7/20/2024 1912)  Maintains or returns to baseline bowel function: Assess bowel function  7/20/2024 1041 by Rhiannon Bain RN  Outcome: Progressing     Problem: Behavior  Goal: Pt/Family maintain appropriate behavior and adhere to behavioral management agreement, if implemented  Description: INTERVENTIONS:  1. Assess patient/family's coping skills and  non-compliant behavior (including use of illegal substances)  2. Notify security of behavior or suspected illegal substances which indicate the need for search of the family and/or belongings  3. Encourage verbalization of thoughts and concerns in a socially appropriate manner  4. Utilize positive, consistent limit setting strategies supporting safety of patient, staff and others  5. Encourage participation in the decision making process about the behavioral management agreement  6. If a visitor's behavior poses a threat to safety call refer to organization policy.  7. Initiate consult with , Psychosocial CNS, Spiritual Care as appropriate  7/20/2024 2132 by Mariya Andrews, RN  Outcome: Progressing  Flowsheets (Taken 7/20/2024 1912)  Patient/family maintains appropriate behavior and 
RN  Outcome: Progressing  7/21/2024 1045 by Rhiannon Bain RN  Outcome: Progressing  7/20/2024 2132 by Mariya Andrews RN  Outcome: Progressing  Flowsheets (Taken 7/20/2024 1912)  Minimal or absence of nausea and vomiting: Administer ordered antiemetic medications as needed  Goal: Maintains or returns to baseline bowel function  7/21/2024 1046 by Rhiannon Bain RN  Outcome: Progressing  7/21/2024 1045 by Rhiannon Bain RN  Outcome: Progressing  7/20/2024 2132 by Mariya Andrews RN  Outcome: Progressing  Flowsheets (Taken 7/20/2024 1912)  Maintains or returns to baseline bowel function: Assess bowel function     Problem: Behavior  Goal: Pt/Family maintain appropriate behavior and adhere to behavioral management agreement, if implemented  Description: INTERVENTIONS:  1. Assess patient/family's coping skills and  non-compliant behavior (including use of illegal substances)  2. Notify security of behavior or suspected illegal substances which indicate the need for search of the family and/or belongings  3. Encourage verbalization of thoughts and concerns in a socially appropriate manner  4. Utilize positive, consistent limit setting strategies supporting safety of patient, staff and others  5. Encourage participation in the decision making process about the behavioral management agreement  6. If a visitor's behavior poses a threat to safety call refer to organization policy.  7. Initiate consult with , Psychosocial CNS, Spiritual Care as appropriate  7/21/2024 1046 by Rhiannon Bain RN  Outcome: Progressing  7/21/2024 1045 by Rhiannon Bain RN  Outcome: Progressing  7/20/2024 2132 by Mariya Andrews RN  Outcome: Progressing  Flowsheets (Taken 7/20/2024 1912)  Patient/family maintains appropriate behavior and adheres to behavioral management agreement, if implemented: Encourage verbalization of thoughts and concerns in a socially appropriate manner     Problem: ABCDS Injury Assessment  Goal: Absence of 
management agreement  6. If a visitor's behavior poses a threat to safety call refer to organization policy.  7. Initiate consult with , Psychosocial CNS, Spiritual Care as appropriate  7/21/2024 1045 by Rhiannon Bain RN  Outcome: Progressing  7/20/2024 2132 by Mariya Andrews, RN  Outcome: Progressing  Flowsheets (Taken 7/20/2024 1912)  Patient/family maintains appropriate behavior and adheres to behavioral management agreement, if implemented: Encourage verbalization of thoughts and concerns in a socially appropriate manner     Problem: ABCDS Injury Assessment  Goal: Absence of physical injury  7/21/2024 1045 by Rhiannon Bain, RN  Outcome: Progressing  7/20/2024 2132 by Mariya Andrews, RN  Outcome: Progressing

## 2024-07-23 VITALS
HEIGHT: 64 IN | WEIGHT: 148.59 LBS | OXYGEN SATURATION: 100 % | SYSTOLIC BLOOD PRESSURE: 83 MMHG | TEMPERATURE: 97.9 F | RESPIRATION RATE: 17 BRPM | DIASTOLIC BLOOD PRESSURE: 58 MMHG | HEART RATE: 80 BPM | BODY MASS INDEX: 25.37 KG/M2

## 2024-07-23 LAB
GLUCOSE BLD STRIP.AUTO-MCNC: 110 MG/DL (ref 65–100)
GLUCOSE BLD STRIP.AUTO-MCNC: 140 MG/DL (ref 65–100)
SERVICE CMNT-IMP: ABNORMAL
SERVICE CMNT-IMP: ABNORMAL

## 2024-07-23 PROCEDURE — 97530 THERAPEUTIC ACTIVITIES: CPT

## 2024-07-23 PROCEDURE — 6370000000 HC RX 637 (ALT 250 FOR IP): Performed by: PHYSICIAN ASSISTANT

## 2024-07-23 PROCEDURE — 2580000003 HC RX 258: Performed by: INTERNAL MEDICINE

## 2024-07-23 PROCEDURE — 82962 GLUCOSE BLOOD TEST: CPT

## 2024-07-23 PROCEDURE — 99024 POSTOP FOLLOW-UP VISIT: CPT | Performed by: PHYSICIAN ASSISTANT

## 2024-07-23 RX ORDER — VALSARTAN 40 MG/1
40 TABLET ORAL DAILY
Qty: 30 TABLET | Refills: 3
Start: 2024-07-23

## 2024-07-23 RX ORDER — ACETAMINOPHEN 325 MG/1
650 TABLET ORAL EVERY 6 HOURS PRN
Qty: 120 TABLET | Refills: 3 | COMMUNITY
Start: 2024-07-23

## 2024-07-23 RX ORDER — LEVOTHYROXINE SODIUM 0.15 MG/1
75 TABLET ORAL
Qty: 30 TABLET | Refills: 3
Start: 2024-07-23

## 2024-07-23 RX ORDER — TRAMADOL HYDROCHLORIDE 50 MG/1
50 TABLET ORAL EVERY 6 HOURS PRN
Qty: 20 TABLET | Refills: 0
Start: 2024-07-23 | End: 2024-07-28

## 2024-07-23 RX ORDER — CARVEDILOL 6.25 MG/1
6.25 TABLET ORAL 2 TIMES DAILY
Qty: 60 TABLET | Refills: 3
Start: 2024-07-23

## 2024-07-23 RX ORDER — ASPIRIN 81 MG/1
81 TABLET ORAL DAILY
Qty: 30 TABLET | Refills: 3 | COMMUNITY
Start: 2024-07-24

## 2024-07-23 RX ORDER — GABAPENTIN 100 MG/1
100 CAPSULE ORAL DAILY
Status: DISCONTINUED | OUTPATIENT
Start: 2024-07-23 | End: 2024-07-23 | Stop reason: HOSPADM

## 2024-07-23 RX ADMIN — GUAIFENESIN 600 MG: 600 TABLET ORAL at 08:38

## 2024-07-23 RX ADMIN — LEVOTHYROXINE SODIUM 75 MCG: 0.07 TABLET ORAL at 06:05

## 2024-07-23 RX ADMIN — FAMOTIDINE 20 MG: 20 TABLET, FILM COATED ORAL at 08:38

## 2024-07-23 RX ADMIN — CARVEDILOL 6.25 MG: 6.25 TABLET, FILM COATED ORAL at 08:38

## 2024-07-23 RX ADMIN — Medication 1 AMPULE: at 08:37

## 2024-07-23 RX ADMIN — OXYCODONE HYDROCHLORIDE AND ACETAMINOPHEN 1 TABLET: 5; 325 TABLET ORAL at 08:39

## 2024-07-23 RX ADMIN — SODIUM CHLORIDE, PRESERVATIVE FREE 10 ML: 5 INJECTION INTRAVENOUS at 08:40

## 2024-07-23 RX ADMIN — GABAPENTIN 100 MG: 100 CAPSULE ORAL at 10:58

## 2024-07-23 RX ADMIN — ASPIRIN 81 MG: 81 TABLET, COATED ORAL at 08:38

## 2024-07-23 RX ADMIN — LOSARTAN POTASSIUM 25 MG: 25 TABLET, FILM COATED ORAL at 08:38

## 2024-07-23 NOTE — PROGRESS NOTES
ICU Dailyl Progress Note  Tanika Medina  2024   Date of Admission:  2024    Hospital course:  Patient is a 78 y.o. female presents to [facility] via EMS.  Upon arrival, she was tripoding with severe shortness of breath and respiratory distress.  CPAP was trialed with mild improvement but became fatigued and less responsive.  She was intubated in the field and brought to the hospital.  Past medical history includes anxiety, arthritis, chronic pain, CAD post stenting in , GERD, hypothyroidism, suspected sleep apnea.   workup in the ED included ABG showing pH 7.35/CO2 45.7/pO2 231/bicarb 25.4, normal white count, unremarkable CMP, troponin mildly elevated, BNP 2100, Pro-Eitan negative.  Lactic acid elevated 3.3.  Chest x-ray shows increased haziness on the right, mild pulmonary vascular congestion, ET tube just above the ceasar.     at bedside and provides history.  States over the last month she has had episodic episodes of shortness of breath.  She has noticed that one of her ankles has been more swollen than the other, but both legs have been swollen.  She reported fatigue, and felt like she had an upset stomach.  's denies any known vomiting, fevers, illness, or reports of chest pain.  She does have a history of chronic pain and is on Norco and gabapentin at home.  She traveled to [place] yesterday and back for a .  Patient reports went to bed and seemed okay and woke up this morning with significant shortness of breath and severe anxiety.    The patient's chart is reviewed and the patient is discussed with the staff.    Subjective:     Extubated.  JAY yesterday showed severe mitral regurgitation.  This coincides with the left and right heart cath yesterday which showed an ejection fraction less than 35% and 4+ MR.    Fluid balance 865ml net negative yesterday.      Review of Systems: Limited as per above since 
                                                                    ICU Dailyl Progress Note  Tanika Medina  2024   Date of Admission:  2024    Hospital course:  Patient is a 78 y.o. female presents to [facility] via EMS.  Upon arrival, she was tripoding with severe shortness of breath and respiratory distress.  CPAP was trialed with mild improvement but became fatigued and less responsive.  She was intubated in the field and brought to the hospital.  Past medical history includes anxiety, arthritis, chronic pain, CAD post stenting in , GERD, hypothyroidism, suspected sleep apnea.   workup in the ED included ABG showing pH 7.35/CO2 45.7/pO2 231/bicarb 25.4, normal white count, unremarkable CMP, troponin mildly elevated, BNP 2100, Pro-Eitan negative.  Lactic acid elevated 3.3.  Chest x-ray shows increased haziness on the right, mild pulmonary vascular congestion, ET tube just above the ceasar.     at bedside and provides history.  States over the last month she has had episodic episodes of shortness of breath.  She has noticed that one of her ankles has been more swollen than the other, but both legs have been swollen.  She reported fatigue, and felt like she had an upset stomach.  's denies any known vomiting, fevers, illness, or reports of chest pain.  She does have a history of chronic pain and is on Norco and gabapentin at home.  She traveled to [place] yesterday and back for a .  Patient reports went to bed and seemed okay and woke up this morning with significant shortness of breath and severe anxiety.    The patient's chart is reviewed and the patient is discussed with the staff.    Subjective:     Patient patient remains on the ventilator at this time.  Reported will be going downstairs for a cardiac catheterization and likely JAY.  Patient currently indicates not in distress.  No pain.  Limited interaction since on the ventilator     Review of Systems: Limited as per 
                                                                    ICU Dailyl Progress Note  Tanika Medina  7/2/2024   Date of Admission:  6/29/2024    Hospital course:  Patient is a 78 y.o. female presents to [facility] via EMS.  Upon arrival, she was tripoding with severe shortness of breath and respiratory distress.  CPAP was trialed with mild improvement but became fatigued and less responsive.  She was intubated in the field and brought to the hospital.  Past medical history includes anxiety, arthritis, chronic pain, CAD post stenting in 2008, GERD, hypothyroidism, suspected sleep apnea.   workup in the ED included ABG showing pH 7.35/CO2 45.7/pO2 231/bicarb 25.4, normal white count, unremarkable CMP, troponin mildly elevated, BNP 2100, Pro-Eitan negative.  Lactic acid elevated 3.3.  Chest x-ray shows increased haziness on the right, mild pulmonary vascular congestion, ET tube just above the ceasar.    Patient was found to have 4+ MR and reduced ejection fraction.  MVR/AVR is recommended    The patient's chart is reviewed and the patient is discussed with the staff.    Subjective:     Was delirious yesterday and required Precedex overnight.  Fluid balance was 2 L net negative.  Oxygen saturation is 94% on room air.  Blood pressure is normal.    Review of Systems: Limited as per above since on ventilator    Current Outpatient Medications   Medication Instructions    aluminum hydroxide-magnesium carbonate (GENATON)  MG/15ML suspension 15 mLs, Oral, EVERY 6 HOURS PRN    amoxicillin (AMOXIL) 500 MG capsule Dental procedures    BIOTIN PO Oral    CALCIUM PO 1,200 mg, Oral    Cholecalciferol 50 MCG (2000 UT) TABS Oral, 2 TIMES DAILY    cyanocobalamin 1,000 mcg, Oral, DAILY    cyclobenzaprine (FLEXERIL) 10 mg, Oral, 3 TIMES DAILY PRN    diclofenac sodium (VOLTAREN) 1 % GEL Topical, 4 TIMES DAILY    DULoxetine (CYMBALTA) 60 mg, Oral, DAILY    gabapentin (NEURONTIN) 100 mg, Oral, DAILY    
                         [facility] CARDIOLOGY PROGRESS NOTE           7/13/2024 12:00 PM    Admit Date: 6/29/2024      Subjective:   Patient reports she was able to work with physical therapy today, denies dizziness or lightheadedness while working with physical therapy.  No chest pain, shortness of breath, abdominal pain, nausea, vomiting, leg swelling.  No other complaints at this time.  Reports she is tired after working with PT.    ROS:  Cardiovascular:  As noted above    Objective:      Vitals:    07/13/24 0907 07/13/24 1000 07/13/24 1050 07/13/24 1101   BP: (!) 174/72 (!) 166/71 132/60 (!) 152/70   Pulse: 62 58 77 67   Resp: 18 19 20 21   Temp:       TempSrc:    Oral   SpO2: 96% 95% 98% 93%   Weight:       Height:           Physical Exam:  General-No Acute Distress alert, interactive  Neck- supple, no JVD  CV- regular rate and rhythm, 2/6 systolic murmur left lower sternal border  Lung-lower field rales bilaterally, nonlabored respirations, no wheezes, chest tubes in place.  Abd- soft, nontender, nondistended  Ext-trace leg edema bilaterally.  Compression stockings in place.  Skin- warm and dry    Data Review:   Recent Labs     07/11/24  1318 07/11/24  1532 07/12/24  0305 07/13/24  0156     --  142 137   K 3.5   < > 4.3 3.6   MG  --    < > 2.1 1.9   BUN 15  --  10 9   WBC 22.7*   < > 11.4* 17.8*   HGB 13.0   < > 9.4* 9.2*   HCT 39.5   < > 29.4* 28.4*   PLT 59*   < > 76* 71*   INR 1.5  --   --   --     < > = values in this interval not displayed.       Assessment/Plan:     Active Hospital Problems     Aortic valve stenosis, non rheumatic S/P AVR     Severe mitral regurgitation status post MVR  - Continue ASA 81  - Postop care as per surgery, seems to be progressing well.  -Continue Coreg for heart rate and blood pressure control, titrate as needed.  -Currently with sinus rhythm, tolerating low-dose carvedilol.  Continue to monitor.  Pacing wires in place but not being used at this time.      Acute on 
                         [facility] CARDIOLOGY PROGRESS NOTE           7/14/2024 9:16 AM    Admit Date: 6/29/2024      Subjective:   Patient with arrhythmia overnight, initially bradycardia and subsequent RRT PVCs leading to torsades, which was symptomatic.  Treated with IV magnesium and rhythm recovered.  She is currently overdrive paced and hemodynamically stable.  At this time patient denies shortness of breath, chest pain, dizziness or lightheadedness.  No other complaints at this time.    ROS:  Cardiovascular:  As noted above    Objective:      Vitals:    07/14/24 0815 07/14/24 0830 07/14/24 0845 07/14/24 0900   BP: (!) 143/70 (!) 154/68 (!) 150/65 (!) 130/58   Pulse: 99 100 100 100   Resp: 21 22 20 18   Temp:       TempSrc:       SpO2: 98% 98% 98% 97%   Weight:       Height:         Physical Exam:  General-No Acute Distress, awake, interactive   Neck- supple, no JVD  CV-tachycardic rate occasional ectopy on monitor and rhythm, systolic murmur left lower sternal border  Lung- clear bilaterally  Abd- soft, nontender, nondistended  Ext-trace leg edema bilaterally.  Skin- warm and dry    Data Review:   Recent Labs     07/11/24  1318 07/11/24  1532 07/13/24  0156 07/13/24  1357 07/13/24  2042 07/14/24  0311      < > 137  --   --  136   K 3.5   < > 3.6   < > 3.3* 4.4   MG  --    < > 1.9   < > 2.0 2.2   BUN 15   < > 9  --   --  8   WBC 22.7*   < > 17.8*  --   --  19.6*   HGB 13.0   < > 9.2*  --   --  9.8*   HCT 39.5   < > 28.4*  --   --  30.6*   PLT 59*   < > 71*  --   --  92*   INR 1.5  --   --   --   --   --     < > = values in this interval not displayed.       Assessment/Plan:       Aortic valve stenosis, non rheumatic S/P AVR     Severe mitral regurgitation status post MVR  - Continue ASA 81  - Postop care as per surgery, seems to be progressing well.  -Continue Coreg for heart rate and blood pressure control, titrate as needed.  -Currently with sinus rhythm, tolerating low-dose carvedilol.  Continue to 
               Tanika Medina  Admission Date: 6/29/2024         Daily Progress Note: 7/16/2024    The patient's chart is reviewed and the patient is discussed with the staff.    Background: 78 y.o female with PMH of CAD, HLD, GERD, hypothyroid, aortic valve insufficiency s/p AVR/MVR on 7/11 with Dr. Ellis. Post-op course complicated by torsades on 7/14 but was self limiting.     Subjective:     Transferred to floor yesterday   Remains on RA. Good output with lasix but remains slightly positive in 24 hours. States she has had some pain overnight and didn't sleep well. Is coughing up some congestion.     Current Facility-Administered Medications   Medication Dose Route Frequency    guaiFENesin (MUCINEX) extended release tablet 600 mg  600 mg Oral BID    oxyCODONE-acetaminophen (PERCOCET) 5-325 MG per tablet 1 tablet  1 tablet Oral Q4H PRN    traMADol (ULTRAM) tablet 50 mg  50 mg Oral Q6H PRN    naloxegol (MOVANTIK) tablet 25 mg  25 mg Oral Daily    sodium chloride flush 0.9 % injection 5-40 mL  5-40 mL IntraVENous 2 times per day    sodium chloride flush 0.9 % injection 5-40 mL  5-40 mL IntraVENous PRN    acetaminophen (TYLENOL) tablet 650 mg  650 mg Oral Q4H PRN    furosemide (LASIX) tablet 40 mg  40 mg Oral Daily    potassium chloride (KLOR-CON M) extended release tablet 10 mEq  10 mEq Oral Daily    sennosides-docusate sodium (SENOKOT-S) 8.6-50 MG tablet 1 tablet  1 tablet Oral BID    polyethylene glycol (GLYCOLAX) packet 17 g  17 g Oral Daily PRN    magnesium hydroxide (MILK OF MAGNESIA) 400 MG/5ML suspension 30 mL  30 mL Oral Daily PRN    famotidine (PEPCID) tablet 20 mg  20 mg Oral BID    Or    famotidine (PEPCID) 20 mg in sodium chloride (PF) 0.9 % 10 mL injection  20 mg IntraVENous BID    insulin lispro (HUMALOG,ADMELOG) injection vial 0-12 Units  0-12 Units SubCUTAneous TID WC    insulin lispro (HUMALOG,ADMELOG) injection vial 0-6 Units  0-6 Units SubCUTAneous Nightly    glucose chewable tablet 16 g  4 
               Tanika Medina  Admission Date: 6/29/2024         Daily Progress Note: 7/17/2024    The patient's chart is reviewed and the patient is discussed with the staff.    Background: 78 y.o female with PMH of CAD, HLD, GERD, hypothyroid, aortic valve insufficiency s/p AVR/MVR on 7/11 with Dr. Ellis. Post-op course complicated by torsades on 7/14 but was self limiting.     Subjective:     Family at bedside. States she feels better than yesterday. On room air. Cardiology considering placing ICD before discharge    Current Facility-Administered Medications   Medication Dose Route Frequency    losartan (COZAAR) tablet 25 mg  25 mg Oral Daily    guaiFENesin (MUCINEX) extended release tablet 600 mg  600 mg Oral BID    potassium chloride (KLOR-CON M) extended release tablet 40 mEq  40 mEq Oral Once    oxyCODONE-acetaminophen (PERCOCET) 5-325 MG per tablet 1 tablet  1 tablet Oral Q4H PRN    traMADol (ULTRAM) tablet 50 mg  50 mg Oral Q6H PRN    [Held by provider] naloxegol (MOVANTIK) tablet 25 mg  25 mg Oral Daily    sodium chloride flush 0.9 % injection 5-40 mL  5-40 mL IntraVENous 2 times per day    sodium chloride flush 0.9 % injection 5-40 mL  5-40 mL IntraVENous PRN    acetaminophen (TYLENOL) tablet 650 mg  650 mg Oral Q4H PRN    furosemide (LASIX) tablet 40 mg  40 mg Oral Daily    polyethylene glycol (GLYCOLAX) packet 17 g  17 g Oral Daily PRN    magnesium hydroxide (MILK OF MAGNESIA) 400 MG/5ML suspension 30 mL  30 mL Oral Daily PRN    famotidine (PEPCID) tablet 20 mg  20 mg Oral BID    Or    famotidine (PEPCID) 20 mg in sodium chloride (PF) 0.9 % 10 mL injection  20 mg IntraVENous BID    insulin lispro (HUMALOG,ADMELOG) injection vial 0-12 Units  0-12 Units SubCUTAneous TID WC    insulin lispro (HUMALOG,ADMELOG) injection vial 0-6 Units  0-6 Units SubCUTAneous Nightly    glucose chewable tablet 16 g  4 tablet Oral PRN    dextrose bolus 10% 125 mL  125 mL IntraVENous PRN    Or    dextrose bolus 10% 250 mL  
               Tanika Medina  Admission Date: 6/29/2024         Daily Progress Note: 7/18/2024    The patient's chart is reviewed and the patient is discussed with the staff.    Background: 78 y.o female with PMH of CAD, HLD, GERD, hypothyroid, aortic valve insufficiency s/p AVR/MVR on 7/11 with Dr. Ellis. Post-op course complicated by torsades on 7/14 but was self limiting.     Subjective:     Had ICD placed yesterday. States she feels good. Reports she feels like her swelling is significantly better. On room air     Current Facility-Administered Medications   Medication Dose Route Frequency    losartan (COZAAR) tablet 25 mg  25 mg Oral Daily    ceFAZolin (ANCEF) 2,000 mg in sod chloride IRR soln 0.9 % 1,000 mL  2,000 mg Irrigation Once    sodium chloride flush 0.9 % injection 5-40 mL  5-40 mL IntraVENous 2 times per day    sodium chloride flush 0.9 % injection 5-40 mL  5-40 mL IntraVENous PRN    0.9 % sodium chloride infusion   IntraVENous PRN    acetaminophen (TYLENOL) tablet 650 mg  650 mg Oral Q4H PRN    polyethylene glycol (GLYCOLAX) packet 17 g  17 g Oral Daily PRN    ondansetron (ZOFRAN) injection 4 mg  4 mg IntraVENous Q6H PRN    guaiFENesin (MUCINEX) extended release tablet 600 mg  600 mg Oral BID    potassium chloride (KLOR-CON M) extended release tablet 40 mEq  40 mEq Oral Once    oxyCODONE-acetaminophen (PERCOCET) 5-325 MG per tablet 1 tablet  1 tablet Oral Q4H PRN    traMADol (ULTRAM) tablet 50 mg  50 mg Oral Q6H PRN    [Held by provider] naloxegol (MOVANTIK) tablet 25 mg  25 mg Oral Daily    sodium chloride flush 0.9 % injection 5-40 mL  5-40 mL IntraVENous 2 times per day    sodium chloride flush 0.9 % injection 5-40 mL  5-40 mL IntraVENous PRN    furosemide (LASIX) tablet 40 mg  40 mg Oral Daily    magnesium hydroxide (MILK OF MAGNESIA) 400 MG/5ML suspension 30 mL  30 mL Oral Daily PRN    famotidine (PEPCID) tablet 20 mg  20 mg Oral BID    Or    famotidine (PEPCID) 20 mg in sodium chloride (PF) 
               Tanika Medina  Admission Date: 6/29/2024         Daily Progress Note: 7/19/2024    The patient's chart is reviewed and the patient is discussed with the staff.    Background: 78 y.o female with PMH of CAD, HLD, GERD, hypothyroid, aortic valve insufficiency s/p AVR/MVR on 7/11 with Dr. Ellis. Post-op course complicated by torsades on 7/14 but was self limiting.     Subjective:     Net negative 1.4 L in 24 hours. Doing well on room air. Needs more aggressive PT/OT.      Current Facility-Administered Medications   Medication Dose Route Frequency    losartan (COZAAR) tablet 25 mg  25 mg Oral Daily    ceFAZolin (ANCEF) 2,000 mg in sod chloride IRR soln 0.9 % 1,000 mL  2,000 mg Irrigation Once    sodium chloride flush 0.9 % injection 5-40 mL  5-40 mL IntraVENous 2 times per day    sodium chloride flush 0.9 % injection 5-40 mL  5-40 mL IntraVENous PRN    0.9 % sodium chloride infusion   IntraVENous PRN    acetaminophen (TYLENOL) tablet 650 mg  650 mg Oral Q4H PRN    polyethylene glycol (GLYCOLAX) packet 17 g  17 g Oral Daily PRN    ondansetron (ZOFRAN) injection 4 mg  4 mg IntraVENous Q6H PRN    guaiFENesin (MUCINEX) extended release tablet 600 mg  600 mg Oral BID    potassium chloride (KLOR-CON M) extended release tablet 40 mEq  40 mEq Oral Once    oxyCODONE-acetaminophen (PERCOCET) 5-325 MG per tablet 1 tablet  1 tablet Oral Q4H PRN    traMADol (ULTRAM) tablet 50 mg  50 mg Oral Q6H PRN    [Held by provider] naloxegol (MOVANTIK) tablet 25 mg  25 mg Oral Daily    sodium chloride flush 0.9 % injection 5-40 mL  5-40 mL IntraVENous 2 times per day    sodium chloride flush 0.9 % injection 5-40 mL  5-40 mL IntraVENous PRN    magnesium hydroxide (MILK OF MAGNESIA) 400 MG/5ML suspension 30 mL  30 mL Oral Daily PRN    famotidine (PEPCID) tablet 20 mg  20 mg Oral BID    Or    famotidine (PEPCID) 20 mg in sodium chloride (PF) 0.9 % 10 mL injection  20 mg IntraVENous BID    insulin lispro (HUMALOG,ADMELOG) injection 
              [facility] CARDIOLOGY PROGRESS NOTE           7/12/2024 8:25 AM    Admit Date: 6/29/2024      Subjective:   Pod1  No inc cp or sob    Objective:      Vitals:    07/12/24 0730 07/12/24 0745 07/12/24 0800 07/12/24 0815   BP:       Pulse: 79 79 79 79   Resp: 17 18 25 24   Temp:       TempSrc:       SpO2: 97% 97% 97% 96%   Weight:       Height:           Physical Exam:  General-resting comfortably, awake, alert, Underlying rhythm sinus lary, BP better w/ paced rhythm      Data Review:   Recent Labs     07/10/24  0441 07/11/24  0352 07/11/24  1318 07/11/24  1532 07/11/24  1923 07/12/24  0305      < > 145  --   --  142   K 4.0   < > 3.5   < > 3.3* 4.3   MG  --   --   --    < > 2.2 2.1   BUN 20   < > 15  --   --  10   WBC 8.5   < > 22.7*   < > 12.5* 11.4*   HGB 14.8   < > 13.0   < > 9.8* 9.4*   HCT 44.2   < > 39.5   < > 30.0* 29.4*      < > 59*   < > 84* 76*   INR 1.0  --  1.5  --   --   --     < > = values in this interval not displayed.       Assessment/Plan:     S/p respiratory failure due to severe MR and AI  Ascvd, patent arteries  S/pbMVR and bAVR 7/11  ///  Hopefully a permanent pacer can be avoided    JERMAINE HERRERA MD  7/12/2024 8:25 AM   
              [facility] CARDIOLOGY PROGRESS NOTE           7/2/2024 8:48 AM    Admit Date: 6/29/2024      Subjective:   No chest pain or inc sob      Objective:      Vitals:    07/02/24 0630 07/02/24 0645 07/02/24 0700 07/02/24 0715   BP: (!) 97/49 (!) 116/58 (!) 115/58 (!) 112/56   Pulse: 73 64 62 63   Resp: 14 16 18 25   Temp:    98.2 °F (36.8 °C)   TempSrc:    Axillary   SpO2: 95% 97% 96% 94%   Weight:       Height:           Physical Exam:  General-No Acute Distress, awake, confused  Neck- supple, no JVD  CV- regular rate and rhythm + MRG  Lung- clear bilaterally  Abd- soft, nontender, nondistended  Ext- no edema bilaterally.  Skin- warm and dry    Data Review:   Recent Labs     07/01/24  0426 07/01/24  1552 07/02/24  0619     --  141   K 3.0* 3.8 4.1   MG 1.9 1.7* 2.5*   BUN 13  --  16   WBC 11.7*  --  10.7   HGB 12.6  --  13.3   HCT 37.9  --  40.5     --  195       Assessment/Plan:     S/p respiratory failure due to severe MR and AI  Ascvd, patent arteries  ///  Continue current rx  Will need MVR and AVR   Transfer to floor       JERMAINE HERRERA MD  7/2/2024 8:48 AM   
              [facility] CARDIOLOGY PROGRESS NOTE           7/8/2024 10:05 AM    Admit Date: 6/29/2024      Subjective:     No overnight events.  On room air.  Frustrated regarding not knowing details for plans for surgery.  ~9.6L net neg    ROS:  Cardiovascular:  As noted above    Objective:      Vitals:    07/07/24 2253 07/08/24 0334 07/08/24 0435 07/08/24 0823   BP: 128/60 (!) 124/53  135/65   Pulse: 68 71  68   Resp: 17 17 18    Temp: 98 °F (36.7 °C) 97.5 °F (36.4 °C)  97.9 °F (36.6 °C)   TempSrc: Oral Oral  Axillary   SpO2: 96% 97%  99%   Weight:       Height:           Physical Exam:  General-No Acute Distress  Neck- supple, no JVD  CV- regular rate and rhythm; 2/6 AKILAH at RUSB  Lung- clear bilaterally  Abd- soft, nontender, nondistended  Ext- no edema bilaterally.  Skin- warm and dry    Data Review:   Recent Labs     07/06/24  0621 07/08/24  0427    136   K 3.7 3.0*   MG 2.0 2.1   BUN 18 19   WBC 7.8 9.9   HGB 14.2 14.5   HCT 42.2 44.5    320       Assessment/Plan:     Principal Problem:    Acute on chronic respiratory failure with hypoxia (HCC)  -Improved.  Currently on room air.     Cardiomyopathy  -New onset moderate left ventricular dysfunction; not on therapy for left ventricular dysfunction as tolerated.  -Coronary angiogram from 6/30/2024 with patent coronary anatomy.  -Likely contributed by underlying valvular heart disease with stated severe MR angiographically/also per JAY report; also stated moderate aortic stenosis/aortic regurgitation with JAY from 7/5/2024.  Has been evaluated by CT surgery  -Currently on low-dose Toprol-XL/Aldactone/Lasix 40 mg twice daily p.o.  Assess add on for SGLT2 inhibitor/ACEI/ARB/ARNI during hospital course; replete electrolytes    Active Problems:    Volume overload  -Improved      Coronary atherosclerosis of native coronary vessel  -Stable anatomy      Acute pulmonary edema (HCC)    Pleural effusion    Acute respiratory failure with hypoxia (HCC)    Altered 
              [facility] CARDIOLOGY PROGRESS NOTE           7/15/2024 12:29 PM    Admit Date: 6/29/2024      Subjective:     No inc cp or sob    Objective:      Vitals:    07/15/24 0800 07/15/24 0815 07/15/24 0830 07/15/24 0838   BP:    135/74   Pulse: 100 100 100 100   Resp: 16 18 22 27   Temp:       TempSrc:       SpO2: 96% 97% 96% 97%   Weight:       Height:           Physical Exam:  General-resting comfortably, awake, alert, a fib, rate OK  Data Review:   Recent Labs     07/14/24  0311 07/14/24  1340 07/14/24  1752 07/15/24  0410    140  --  137   K 4.4 3.2* 4.6 3.7   MG 2.2 2.1  --  2.0   BUN 8 7*  --  8   WBC 19.6*  --   --  14.3*   HGB 9.8*  --   --  10.2*   HCT 30.6*  --   --  30.8*   PLT 92*  --   --  126*       Assessment/Plan:     S/p respiratory failure due to severe MR and AI  Ascvd, patent arteries  S/pb MVR and bAVR 7/11  Torsades 7/14, self limited, no recurrence  ///  For floor transfer.  Continue current rx  Will need OAC    JERMAINE HERRERA MD  7/15/2024 12:29 PM   
              [facility] CARDIOLOGY PROGRESS NOTE           7/9/2024 10:05 AM    Admit Date: 6/29/2024      Subjective:     No overnight events.  On room air.  Seen by CT surgery with plans for surgery on Thursday.  ~11L net neg. Improved dyspnea    ROS:  Cardiovascular:  As noted above    Objective:      Vitals:    07/09/24 0037 07/09/24 0438 07/09/24 0732 07/09/24 0822   BP: 130/89 (!) 137/90 121/70 121/70   Pulse: 72 72 79 73   Resp: 16 16 16    Temp: 97.3 °F (36.3 °C) 97.9 °F (36.6 °C) 97.7 °F (36.5 °C)    TempSrc: Oral Oral Oral    SpO2: 98% 99% 98%    Weight:  68.5 kg (151 lb 1.6 oz)     Height:           Physical Exam:  General-No Acute Distress  Neck- supple, no JVD  CV- regular rate and rhythm; 2/6 AKILAH at RUSB  Lung- clear bilaterally  Abd- soft, nontender, nondistended  Ext- no edema bilaterally.  Skin- warm and dry    Data Review:   Recent Labs     07/08/24  0427 07/08/24  1417 07/09/24  0407     --  136   K 3.0* 4.8 4.0   MG 2.1  --   --    BUN 19  --  22   WBC 9.9  --  15.4*   HGB 14.5  --  15.0   HCT 44.5  --  45.4     --  321         Assessment/Plan:     Principal Problem:    Acute on chronic respiratory failure with hypoxia (HCC)  -Improved.  Currently on room air.     Cardiomyopathy  -New onset moderate left ventricular dysfunction; add on therapy for left ventricular dysfunction as tolerated.  -Coronary angiogram from 6/30/2024 with patent coronary anatomy.  -Likely contributed by underlying valvular heart disease with stated severe MR angiographically/also per JAY report; also stated moderate aortic stenosis/aortic regurgitation with JAY from 7/5/2024.  Has been evaluated by CT surgery with plans for surgery 7/11/2024.  -Currently on low-dose Toprol-XL/Aldactone/Lasix 40 mg twice daily p.o.  Assess add on for SGLT2 inhibitor/ACEI/ARB/ARNI during hospital course postop; replete electrolytes as needed.    Active Problems:    Volume overload  -Improved      Coronary atherosclerosis of 
              [facility] CARDIOLOGY PROGRESS NOTE           7/6/2024 9:18 AM    Admit Date: 6/29/2024      Subjective:   No chest pain or inc sob      Objective:      Vitals:    07/06/24 0106 07/06/24 0332 07/06/24 0518 07/06/24 0834   BP:  121/68  134/61   Pulse:  71  57   Resp: 12 13  16   Temp:  98.1 °F (36.7 °C)  97.7 °F (36.5 °C)   TempSrc:  Oral  Axillary   SpO2:  96%  94%   Weight:   69.5 kg (153 lb 3.2 oz)    Height:           Physical Exam:  General-resting comfortably, daughter -in-law at the bedside.      Data Review:   Recent Labs     07/05/24  0654 07/06/24  0621    137   K 4.0 3.7   MG 2.2 2.0   BUN 21 18   WBC 7.9 7.8   HGB 14.8 14.2   HCT 44.8 42.2    273       Assessment/Plan:     S/p respiratory failure due to severe MR and AI  Ascvd, patent arteries  ///  Continue current rx  MVR/AVRr pending    JERMAINE HERRERA MD  7/6/2024 9:18 AM   
              [facility] CARDIOLOGY PROGRESS NOTE           7/10/2024 10:05 AM    Admit Date: 6/29/2024      Subjective:     No overnight events.  On room air.  CT surgery tomorrow    ROS:  Cardiovascular:  As noted above    Objective:      Vitals:    07/10/24 0428 07/10/24 0737 07/10/24 0830 07/10/24 1149   BP: (!) 121/45 91/68 (!) 118/90 (!) 105/57   Pulse: 76 71  70   Resp: 20 20     Temp: 98.1 °F (36.7 °C) 98.1 °F (36.7 °C)  97.9 °F (36.6 °C)   TempSrc:  Oral  Oral   SpO2: 96% 97%  98%   Weight: 69.7 kg (153 lb 9.6 oz)      Height:           Physical Exam:  General-No Acute Distress  Neck- supple, no JVD  CV- regular rate and rhythm; 2/6 AKILAH at RUSB  Lung- clear bilaterally  Abd- soft, nontender, nondistended  Ext- no edema bilaterally.  Skin- warm and dry    Data Review:   Recent Labs     07/08/24  0427 07/08/24  1417 07/09/24  0407 07/10/24  0441     --  136 137   K 3.0*   < > 4.0 4.0   MG 2.1  --   --   --    BUN 19  --  22 20   WBC 9.9  --  15.4* 8.5   HGB 14.5  --  15.0 14.8   HCT 44.5  --  45.4 44.2     --  321 297   INR  --   --   --  1.0    < > = values in this interval not displayed.       Assessment/Plan:     Principal Problem:    Acute on chronic respiratory failure with hypoxia (HCC)   Cardiomyopathy  Mitral regurgitation  Aortic stenosis/insufficiency  Coronary artery disease    Patient stable this morning.  She is off oxygen and lying supine without breathlessness.  Scheduled for AVR/MVR tomorrow morning with Dr. Ellis.              Aaron Gillespie III, MD  7/10/2024 10:05 AM   
              [facility] CARDIOLOGY PROGRESS NOTE           7/5/2024 9:22 AM    Admit Date: 6/29/2024      Subjective:   No chest pain or inc sob      Objective:      Vitals:    07/04/24 1923 07/04/24 1956 07/05/24 0400 07/05/24 0836   BP: (!) 124/47 113/75 (!) 131/50 125/61   Pulse: 78 69 67 65   Resp: 16  15    Temp: 98.2 °F (36.8 °C)  98.2 °F (36.8 °C)    TempSrc: Oral      SpO2: 97%  98%    Weight:   68.9 kg (152 lb)    Height:           Physical Exam:  General-No Acute Distress, awake, less confused  Neck- supple, no JVD  CV- regular rate and rhythm + MRG  Lung- clear bilaterally  Abd- soft, nontender, nondistended  Ext- no edema bilaterally.  Skin- warm and dry    Data Review:   Recent Labs     07/04/24  0358 07/05/24  0654    137   K 3.0* 4.0   MG 2.1 2.2   BUN 23 21   WBC 7.7 7.9   HGB 13.7 14.8   HCT 41.1 44.8    265       Assessment/Plan:     S/p respiratory failure due to severe MR and AI  Ascvd, patent arteries  ///  Continue current rx  CV wants a repeat JAY  Will arrange    JERMAINE HERRERA MD  7/5/2024 9:22 AM   
              [facility] CARDIOLOGY PROGRESS NOTE           7/7/2024 9:10 AM    Admit Date: 6/29/2024      Subjective:   No chest pain or inc sob      Objective:      Vitals:    07/07/24 0002 07/07/24 0405 07/07/24 0709 07/07/24 0815   BP: 125/65 113/63 (!) 118/57 (!) 118/57   Pulse: 73 69 68 68   Resp: 18 18 16    Temp: 98.1 °F (36.7 °C) 97.3 °F (36.3 °C) 98.2 °F (36.8 °C)    TempSrc:   Oral    SpO2: 95% 96% 95%    Weight:       Height:           Physical Exam:  General-resting comfortably, awake, alert, + systolic murmur, clear lungs, no edema.      Data Review:   Recent Labs     07/05/24  0654 07/06/24  0621    137   K 4.0 3.7   MG 2.2 2.0   BUN 21 18   WBC 7.9 7.8   HGB 14.8 14.2   HCT 44.8 42.2    273       Assessment/Plan:     S/p respiratory failure due to severe MR and AI  Ascvd, patent arteries  ///  Change lasix to po  MVR/AVRr pending    JERMAINE HERRERA MD  7/7/2024 9:10 AM   
              [facility] CARDIOLOGY PROGRESS NOTE           7/19/2024 11:13 AM    Admit Date: 6/29/2024      Subjective:   No cp or inc sob      Objective:      Vitals:    07/19/24 0600 07/19/24 0656 07/19/24 0902 07/19/24 1106   BP:  (!) 148/85  122/71   Pulse:  80  77   Resp:  15 20 19   Temp:  98.6 °F (37 °C)  97.5 °F (36.4 °C)   TempSrc:  Temporal     SpO2:  95%  97%   Weight: 68.8 kg (151 lb 10.8 oz)      Height:           Physical Exam:  General-No Acute Distress, up in a chair  Neck- supple, no JVD  CV- regular rate and rhythm no MRG, ICD site ok  Lung- clear bilaterally  Abd- soft, nontender, nondistended  Ext- no edema bilaterally.  Skin- warm and dry    Data Review:   Recent Labs     07/17/24  0355 07/18/24  0450 07/19/24  0505     --   --    K 4.0 3.7 3.5   MG 2.0 1.8  --    BUN 6*  --   --        Assessment/Plan:     Principal Problem:    S/P AVR  Plan:   Active Problems:    S/P MVR (mitral valve replacement)  Plan:     Coronary atherosclerosis of native coronary vessel  Plan:     Acute on chronic respiratory failure with hypoxia (HCC)  Plan:     Acute pulmonary edema (HCC)  Plan:     Pleural effusion  Plan:     Acute respiratory failure with hypoxia (HCC)  Plan:     Frailty  Plan:     Encounter for other specified aftercare  Plan:     Aortic valve stenosis, nonrheumatic  Plan:     Atelectasis  Plan:     Hypoxia  Plan:     Prolonged QT interval  Plan:     NSVT (nonsustained ventricular tachycardia) ([facility])  Plan:     Atrial flutter (HCC)  Plan:     Gait abnormality  Plan:     Decreased activities of daily living (ADL)  ////  CV stable  Agree w/ current rx  Will see at FU.             JERMAINE HERRERA MD  7/19/2024 11:13 AM   
             [facility] CARDIOLOGY PROGRESS NOTE           6/30/2024 10:50 AM    Admit Date: 6/29/2024      Subjective:         Review of Systems        Objective:      Vitals:    06/30/24 0912 06/30/24 0915 06/30/24 0930 06/30/24 0945   BP:  (!) 150/67 (!) 141/63 (!) 148/66   Pulse:  75 71 67   Resp: 18 19 18 19   Temp:       TempSrc:       SpO2:  99% 98% 97%   Weight:       Height:             Physical Exam  Vitals reviewed.   Constitutional:       Comments: Intubated sedated    HENT:      Head: Normocephalic.      Right Ear: External ear normal.      Left Ear: External ear normal.      Nose: Nose normal.   Eyes:      General: No scleral icterus.  Cardiovascular:      Heart sounds: Murmur heard.   Pulmonary:      Effort: Pulmonary effort is normal.   Abdominal:      General: There is no distension.   Musculoskeletal:      Cervical back: Neck supple.   Skin:     General: Skin is warm.   Neurological:      Mental Status: She is alert.      Comments: She is able to follow instructions         Data Review:   Recent Labs     06/29/24  1052 06/29/24  2214 06/30/24  0404    140 138   K 3.9 3.5 3.6   MG 1.7*  --  2.2   BUN 10 9 11   WBC 8.8  --  11.6*   HGB 12.6  --  13.0   HCT 39.2  --  39.8     --  176       [unfilled]  Current Facility-Administered Medications   Medication Dose Route Frequency    sodium chloride flush 0.9 % injection 5-40 mL  5-40 mL IntraVENous 2 times per day    sodium chloride flush 0.9 % injection 5-40 mL  5-40 mL IntraVENous PRN    0.9 % sodium chloride infusion   IntraVENous PRN    potassium chloride 20 mEq/50 mL IVPB (Central Line)  20 mEq IntraVENous PRN    Or    potassium chloride 10 mEq/100 mL IVPB (Peripheral Line)  10 mEq IntraVENous PRN    magnesium sulfate 2000 mg in 50 mL IVPB premix  2,000 mg IntraVENous PRN    ondansetron (ZOFRAN-ODT) disintegrating tablet 4 mg  4 mg Oral Q8H PRN    Or    ondansetron (ZOFRAN) injection 4 mg  4 mg IntraVENous Q6H PRN    polyethylene 
           Pulmonary CV progress Note: 7/12/2024        Tanika Medina                                                     Admission Date: 6/29/2024     The patient's chart is reviewed and the patient is discussed with the staff.    Background: 78 y.o. y/o female  has a past medical history of Abnormal EKG, Aortic valve insufficiency, Arrhythmia, Arthritis, CAD (coronary artery disease), Chest pain, Coronary atherosclerosis of native coronary vessel, Dyslipidemia, and S/P PTCA (percutaneous transluminal coronary angioplasty). Acute on chronic respiratory failure with hypoxia (HCC); 1 Day Post-Op. Mrs. Medina was admitted on 6/29 with severe dyspnea and has a PMH of GERD, hypothyroidism, CAD.     Subjective:     Still on dobutamine, but otherwise looks good. Extubated at midnight to AirVo and weaned to 2L NC this morning. Sitting up in bed.     Review of Systems: Comprehensive ROS negative except in HPI  Objective:   Blood pressure (!) 119/42, pulse 79, temperature 99.3 °F (37.4 °C), temperature source Bladder, resp. rate 21, height 1.626 m (5' 4\"), weight 76.8 kg (169 lb 5 oz), SpO2 98 %.   Intake/Output Summary (Last 24 hours) at 7/12/2024 0932  Last data filed at 7/12/2024 0746  Gross per 24 hour   Intake 6946.31 ml   Output 4515 ml   Net 2431.31 ml     Physical Exam:          Constitutional:  Awake, calm  EENMT:  Sclera clear, pupils equal  Respiratory: clear, diminished, 2L  Cardiovascular:  RRR with no M,G,R;  Gastrointestinal:  soft; + bowel sounds present  Musculoskeletal:  warm with no cyanosis, no lower extremity edema.   SKIN:  no jaundice or ecchymosis   Neurologic:  moving all extremities, fluent speech  Psychiatric:  calm    CXR:  increased bilateral infiltrates/edema, cardiomegaly      LINES:   Chest Tube Right;Other (Comment) Mediastinal 1 (Active)       Chest Tube Left Mediastinal 2 (Active)       Negative Pressure Wound Therapy Sternum Anterior (Active)       Urinary Catheter 07/11/24 2 
          Today's Date: 7/16/2024  Date of Admission: 6/29/2024    POD 5 Days Post-Op    Chart Reviewed.  Subjective:     Patient feels tired. She had diarrhea last night and didn't get much sleep. Appetite fair.     Medications Reviewed.    Objective:       Vitals:    07/15/24 2314 07/16/24 0315 07/16/24 0611 07/16/24 0741   BP: 133/68 132/62  (!) 146/73   Pulse:    79   Resp: 13 14 24   Temp: 98.2 °F (36.8 °C) 98.2 °F (36.8 °C)  99.1 °F (37.3 °C)   TempSrc: Oral Axillary  Oral   SpO2: 93% 93%  94%   Weight:   77.2 kg (170 lb 3.1 oz)    Height:           Intake and Output  Current Shift: No intake/output data recorded.   Last 3 Shifts: 07/14 1901 - 07/16 0700  In: 2690.9 [I.V.:2544.2]  Out: 2750 [Urine:2750]    Physical Exam:  General: Well Developed, Well Nourished, No Acute Distress, Alert & Oriented x 3, Appropriate mood  Neck: supple, no JVD  Heart: S1S2 with RRR without murmurs or gallops  Lungs: mostly clear throughout auscultation bilaterally without adventitious sounds  Abd: soft, nontender, nondistended, with good bowel sounds  Ext: no edema bilaterally  Sternal incision: wound vac in place   Skin: warm and dry    LABS  Data Review:   Recent Labs     07/15/24  0410 07/16/24  0518 07/16/24  0609    139  --    K 3.7 3.2*  --    MG 2.0 1.9  --    BUN 8 9  --    WBC 14.3*  --  12.5*   HGB 10.2*  --  11.4*   HCT 30.8*  --  34.9*   *  --  143*       Estimated Creatinine Clearance: 67 mL/min (based on SCr of 0.68 mg/dL).      Assessment/Plan:      *S/P AVR  On ASA, BB, start ARB, continue statin, stable on room air, pacing wires in place, QT improved and less NSVT, no amiodarone due to prolonged QT, continue PT     Active Hospital Problems    S/P MVR (mitral valve replacement)  As above       Prolonged QT interval  Holding meds, EKG improved today       NSVT (nonsustained ventricular tachycardia) (HCC)  Less NSVT, continue BB      Aortic valve stenosis, nonrheumatic  S/p AVR       Atelectasis  IS 
          Today's Date: 7/17/2024  Date of Admission: 6/29/2024      Chart Reviewed.  Subjective:     Patient is frustrated about waiting for procedure and is eager to leave the hospital.     Medications Reviewed.    Objective:       Vitals:    07/17/24 0428 07/17/24 0705 07/17/24 0925 07/17/24 0929   BP:  109/69  128/85   Pulse:  64  77   Resp: 17 16 16    Temp:  98.1 °F (36.7 °C)     TempSrc:  Oral     SpO2:  94%     Weight:       Height:           Intake and Output  Current Shift: No intake/output data recorded.   Last 3 Shifts: 07/15 1901 - 07/17 0700  In: 844.3 [P.O.:800]  Out: 1750 [Urine:1750]    Physical Exam:  General: Well Developed, Well Nourished, No Acute Distress, Alert & Oriented x 3, Appropriate mood  Neck: supple, no JVD  Heart: S1S2 with RRR without murmurs or gallops  Lungs: mostly clear throughout auscultation bilaterally  Abd: soft, nontender, nondistended, with good bowel sounds  Ext: no edema bilaterally  Sternal incision: clean, dry, and intact  Skin: warm and dry    LABS  Data Review:   Recent Labs     07/15/24  0410 07/16/24  0518 07/16/24  0609 07/17/24  0355    139  --  138   K 3.7 3.2*  --  4.0   MG 2.0 1.9  --  2.0   BUN 8 9  --  6*   WBC 14.3*  --  12.5*  --    HGB 10.2*  --  11.4*  --    HCT 30.8*  --  34.9*  --    *  --  143*  --        Estimated Creatinine Clearance: 70 mL/min (based on SCr of 0.63 mg/dL).      Assessment/Plan:      *S/P AVR  On ASA, BB, ARB, statin, stable on room air, pacing wires in place, QT improved and less NSVT, now in atrial flutter with slow VR, no amiodarone due to prolonged QT, continue PT      Active Hospital Problems    S/P MVR (mitral valve replacement)  As above        Prolonged QT interval  Holding meds, EKG improved today        NSVT (nonsustained ventricular tachycardia) (HCC)  Less NSVT, continue BB, ICD implant planned for today       Aortic valve stenosis, nonrheumatic  S/p AVR        Atelectasis  IS        Hypoxia  Resolved, stable 
          Today's Date: 7/19/2024  Date of Admission: 6/29/2024      Chart Reviewed.  Subjective:     Patient feels tired. She was up all night. She felt poorly yesterday and did not walk with PT but is currently ambulating with PT. She feels weak and easily fatigued.     Medications Reviewed.    Objective:       Vitals:    07/19/24 0315 07/19/24 0600 07/19/24 0656 07/19/24 0902   BP: (!) 148/79  (!) 148/85    Pulse: 76  80    Resp: 16  15 20   Temp: 97.5 °F (36.4 °C)  98.6 °F (37 °C)    TempSrc: Oral  Temporal    SpO2: 94%  95%    Weight:  68.8 kg (151 lb 10.8 oz)     Height:           Intake and Output  Current Shift: No intake/output data recorded.   Last 3 Shifts: 07/17 1901 - 07/19 0700  In: 697 [P.O.:697]  Out: 2500 [Urine:2500]    Physical Exam:  General: Well Developed, Well Nourished, No Acute Distress, Alert & Oriented x 3, Appropriate mood  Neck: supple, no JVD  Heart: S1S2 with RRR without murmurs or gallops  Lungs: mostly clear throughout auscultation bilaterally   Abd: soft, nontender, nondistended, with good bowel sounds  Ext: mild edema bilaterally  Sternal incision: clean, dry, and intact  Skin: warm and dry    LABS  Data Review:   Recent Labs     07/17/24  0355 07/18/24  0450 07/19/24  0505     --   --    K 4.0 3.7 3.5   MG 2.0 1.8  --    BUN 6*  --   --        Estimated Creatinine Clearance: 69 mL/min (based on SCr of 0.63 mg/dL).      Assessment/Plan:      *S/P AVR  On ASA, BB, ARB, statin, stable on room air, had ICD implant 7/17, pacing wires removed, QT improved, no further NSVT noted, no amiodarone due to prolonged QT, continue PT      Active Hospital Problems    S/P MVR (mitral valve replacement)  As above        Prolonged QT interval  Holding meds, improved        NSVT (nonsustained ventricular tachycardia) (HCC)  No further NSVT noted, continue BB, s/p ICD implant        Aortic valve stenosis, nonrheumatic  S/p AVR        Atelectasis  IS        Hypoxia  Resolved, stable on room air 
          Today's Date: 7/22/2024  Date of Admission: 6/29/2024      Chart Reviewed.  Subjective:     Patient feels tired but denies any other complaints.     Medications Reviewed.    Objective:       Vitals:    07/22/24 0711 07/22/24 1112 07/22/24 1545 07/22/24 1614   BP: 131/80 100/63 (!) 96/53    Pulse: 80 80 80    Resp: 16 20 14 20   Temp: 97.9 °F (36.6 °C) 98.2 °F (36.8 °C) 98.2 °F (36.8 °C)    TempSrc: Oral Oral Oral    SpO2: 100% 100% 99%    Weight:       Height:           Intake and Output  Current Shift: 07/22 0701 - 07/22 1900  In: 850 [P.O.:850]  Out: 150 [Urine:150]   Last 3 Shifts: 07/20 1901 - 07/22 0700  In: 1100 [P.O.:1100]  Out: 3050 [Urine:3050]    Physical Exam:  General: Well Developed, Well Nourished, No Acute Distress, Alert & Oriented x 3, Appropriate mood  Neck: supple, no JVD  Heart: S1S2 with RRR without murmurs or gallops  Lungs: Clear throughout auscultation bilaterally  Abd: soft, nontender, nondistended, with good bowel sounds  Ext: no edema bilaterally  Sternal incision: clean, dry, and intact  Skin: warm and dry    LABS  Data Review:   Estimated Creatinine Clearance: 68 mL/min (based on SCr of 0.63 mg/dL).      Assessment/Plan:      *S/P AVR  On ASA, BB, ARB, statin, stable on room air, had ICD implant 7/17, pacing wires removed, QT improved, no further NSVT noted, no amiodarone due to prolonged QT, continue PT      Active Hospital Problems    S/P MVR (mitral valve replacement)  As above        Prolonged QT interval  Holding meds, improved        NSVT (nonsustained ventricular tachycardia) (HCC)  No further NSVT noted, continue BB, s/p ICD implant        Aortic valve stenosis, nonrheumatic  S/p AVR        Atelectasis  IS        Hypoxia  Resolved, stable on room air        Frailty  Continue PT/OT        Encounter for palliative care       Acute on chronic respiratory failure with hypoxia (HCC)  Resolved        Acute pulmonary edema (HCC)       Pleural effusion  Small bilateral pleural 
       Hospitalist Progress Note   Admit Date:  2024 10:42 AM   Name:  Tanika Medina   Age:  78 y.o.  Sex:  female  :  1945   MRN:  [id number]   Room:  [facility]/    Presenting/Chief Complaint: Respiratory Distress     Reason(s) for Admission: Heart murmur [R01.1]  Acute respiratory failure with hypoxia (HCC) [J96.01]  Acute on chronic respiratory failure with hypoxia (HCC) [J96.21]     Hospital Course:   Ms. Medina is a 77 y/o WF with a h/o anxiety, chronic pain, CAD, GERD, hypothyroidism and DIANE admitted to the ICU service on  for acute hypoxic respiratory failure secondary to volume overload. She was intubated.  On EMS arrival she was tripoding with severe shortness of breath and respiratory distress.  CPAP was trialed with mild improvement but became fatigued and less responsive and she was intubated in the field and brought to the hospital.     Cardiology was consulted.  Patient started onIV diuresis.  She underwent left and right heart catheterization 2024 with minimal CAD, EF <35% and severe mitral regurg. Started on GDMT. JAY  showed severely reduced ejection fraction with moderate to severe aortic regurg and severe mitral regurgitation. Patient extubated . We were consulted to assume care. She was transferred to the floor. CT surgery consulted.    Subjective & 24hr Events:     Patient resting without complaint this morning.  We discussed that plan is for surgery on this upcoming Thursday, 2024.    Review of Systems - Negative except as noted above.      Assessment & Plan:   # Acute hypoxemic respiratory failure 2/2 acute volume overload due to aortic and mitral valve disease   - Resolved. Extubated.  Oral Lasix twice daily per Cardiology. She is on RA. Cardiology and CT surgery following.  Entresto on hold, con't Toprol.  JAY showed severely reduced EF with severe.  Mitral regurgitation and moderate AI.  Plan is for surgery 2024.    # Leukocytosis   - Noted on a.m. labs 
       Hospitalist Progress Note   Admit Date:  2024 10:42 AM   Name:  Tanika Medina   Age:  78 y.o.  Sex:  female  :  1945   MRN:  [id number]   Room:  [facility]/    Presenting/Chief Complaint: Respiratory Distress     Reason(s) for Admission: Heart murmur [R01.1]  Acute respiratory failure with hypoxia (HCC) [J96.01]  Acute on chronic respiratory failure with hypoxia (HCC) [J96.21]     Hospital Course:   Ms. Medina is a 77 y/o WF with a h/o anxiety, chronic pain, CAD, GERD, hypothyroidism and DIANE admitted to the ICU service on  for acute hypoxic respiratory failure secondary to volume overload. She was intubated.  On EMS arrival she was tripoding with severe shortness of breath and respiratory distress.  CPAP was trialed with mild improvement but became fatigued and less responsive and she was intubated in the field and brought to the hospital.     Cardiology was consulted.  Patient started onIV diuresis.  She underwent left and right heart catheterization 2024 with minimal CAD, EF <35% and severe mitral regurg. Started on GDMT. JAY  showed severely reduced ejection fraction with moderate to severe aortic regurg and severe mitral regurgitation. Patient extubated . We were consulted to assume care. She was transferred to the floor. CT surgery consulted.    Subjective & 24hr Events:     Patient with multiple family members at bedside today.  She has no acute complaints.  Sitting up in bed, conversant.  Family is curious about surgery scheduling.    Review of Systems - Negative except as noted above.      Assessment & Plan:   # Acute hypoxemic respiratory failure 2/2 acute volume overload due to aortic and mitral valve disease   - Resolved. Extubated. Con't diuresis. She is on RA. Cardiology and CT surgery following.  Entresto on hold, con't Toprol.  JAY showed severely reduced EF with severe.  Mitral regurgitation and moderate AI.  Plan is for surgery early this week.    # Delirium   - She has 
       Hospitalist Progress Note   Admit Date:  2024 10:42 AM   Name:  Tanika Medina   Age:  78 y.o.  Sex:  female  :  1945   MRN:  [id number]   Room:  [facility]/    Presenting/Chief Complaint: Respiratory Distress     Reason(s) for Admission: Heart murmur [R01.1]  Acute respiratory failure with hypoxia (HCC) [J96.01]  Acute on chronic respiratory failure with hypoxia (HCC) [J96.21]     Hospital Course:   Ms. Medina is a 79 y/o WF with a h/o anxiety, chronic pain, CAD, GERD, hypothyroidism and DIANE admitted to the ICU service on  for acute hypoxic respiratory failure secondary to volume overload. She was intubated.  On EMS arrival she was tripoding with severe shortness of breath and respiratory distress.  CPAP was trialed with mild improvement but became fatigued and less responsive and she was intubated in the field and brought to the hospital.     Cardiology was consulted.  Patient started onIV diuresis.  She underwent left and right heart catheterization 2024 with minimal CAD, EF <35% and severe mitral regurg. Started on GDMT. JAY  showed severely reduced ejection fraction with moderate to severe aortic regurg and severe mitral regurgitation. Patient extubated . We were consulted to assume care. She was transferred to the floor. CT surgery consulted.    Subjective & 24hr Events:     Met with patient and family at bedside.  She is sitting up in bed, conversant.  Discussed JAY, plan for ongoing care.  She has no acute complaints today.    Review of Systems - Negative except as noted above.      Assessment & Plan:   # Acute hypoxemic respiratory failure 2/2 acute volume overload due to aortic and mitral valve disease   - Resolved. Extubated. Con't diuresis. She is on RA. Cardiology and CT surgery following.  Entresto on hold, con't Toprol.  JAY showed severely reduced EF with severe.  Mitral regurgitation and moderate AI.      # Delirium   - She has had a slow decline in cognition for about 1 
       Hospitalist Progress Note   Admit Date:  2024 10:42 AM   Name:  Tanika Medina   Age:  78 y.o.  Sex:  female  :  1945   MRN:  [id number]   Room:  [facility]/    Presenting/Chief Complaint: Respiratory Distress     Reason(s) for Admission: Heart murmur [R01.1]  Acute respiratory failure with hypoxia (HCC) [J96.01]  Acute on chronic respiratory failure with hypoxia (HCC) [J96.21]     Hospital Course:   Ms. Medina is a 79 y/o WF with a h/o anxiety, chronic pain, CAD, GERD, hypothyroidism and DIANE admitted to the ICU service on  for acute hypoxic respiratory failure secondary to volume overload. She was intubated.  On EMS arrival she was tripoding with severe shortness of breath and respiratory distress.  CPAP was trialed with mild improvement but became fatigued and less responsive and she was intubated in the field and brought to the hospital.     Cardiology was consulted.  Patient started onIV diuresis.  She underwent left and right heart catheterization 2024 with minimal CAD, EF <35% and severe mitral regurg. Started on GDMT. JAY  showed severely reduced ejection fraction with moderate to severe aortic regurg and severe mitral regurgitation. Patient extubated . We were consulted to assume care. She was transferred to the floor. CT surgery consulted.    Subjective & 24hr Events:     First meeting with patient.  She is sitting up in bed without any localizing complaint.   is at bedside.  Discussed plan for likely repeat JAY tomorrow and ongoing management.    Of note, she apparently has fluctuant mental status.  She was fully oriented during my interview but has had some episodes of confusion and is requiring a sitter at this time.    Review of Systems - Negative except as noted above.      Assessment & Plan:   # Acute hypoxemic respiratory failure 2/2 acute volume overload due to aortic and mitral valve disease   - Resolved. Extubated. Con't diuresis. She is on RA. Cardiology and CT 
       Hospitalist Progress Note   Admit Date:  2024 10:42 AM   Name:  Tanika Medina   Age:  78 y.o.  Sex:  female  :  1945   MRN:  [id number]   Room:  [facility]/    Presenting/Chief Complaint: Respiratory Distress     Reason(s) for Admission: Heart murmur [R01.1]  Acute respiratory failure with hypoxia (HCC) [J96.01]  Acute on chronic respiratory failure with hypoxia (HCC) [J96.21]     Hospital Course:   Ms. Medina is a 79 y/o WF with a h/o anxiety, chronic pain, CAD, GERD, hypothyroidism and DIANE admitted to the ICU service on  for acute hypoxic respiratory failure secondary to volume overload. She was intubated.  On EMS arrival she was tripoding with severe shortness of breath and respiratory distress.  CPAP was trialed with mild improvement but became fatigued and less responsive and she was intubated in the field and brought to the hospital.     Cardiology was consulted.  Patient started onIV diuresis.  She underwent left and right heart catheterization 2024 with minimal CAD, EF <35% and severe mitral regurg. Started on GDMT. JAY  showed severely reduced ejection fraction with moderate to severe aortic regurg and severe mitral regurgitation. Patient extubated . We were consulted to assume care. She was transferred to the floor. CT surgery consulted.    Subjective & 24hr Events:     Patient resting in bed without complaint today.  She is feeling well, family coming to visit later.    Review of Systems - Negative except as noted above.      Assessment & Plan:   # Acute hypoxemic respiratory failure 2/2 acute volume overload due to aortic and mitral valve disease   - Resolved. Extubated.  Oral Lasix twice daily per Cardiology. She is on RA. Cardiology and CT surgery following.  Entresto on hold, con't Toprol.  JAY showed severely reduced EF with severe.  Mitral regurgitation and moderate AI.  Plan is for surgery early this week.    # Delirium   - She has had a slow decline in cognition for 
       Hospitalist Progress Note   Admit Date:  2024 10:42 AM   Name:  Tanika Medina   Age:  78 y.o.  Sex:  female  :  1945   MRN:  [id number]   Room:  [facility]/    Presenting/Chief Complaint: Respiratory Distress     Reason(s) for Admission: Heart murmur [R01.1]  Acute respiratory failure with hypoxia (HCC) [J96.01]  Acute on chronic respiratory failure with hypoxia (HCC) [J96.21]     Hospital Course:     Tanika Medina is a 78 y.o. female with history of anxiety, chronic pain, CAD, GERD, hypothyroidism and sleep apnea admitted to ICU by intensivist on  for acute hypoxic respiratory failure secondary to volume overload, requiring intubation.  On EMS arrival she was tripoding with severe shortness of breath and respiratory distress.  CPAP was trialed with mild improvement but became fatigued and less responsive and she was intubated in the field and brought to the hospital.     Cardiology consulted.  Patient started on diuresis.  Patient status post left and right heart catheterization 2024 with minimal coronary artery disease and severely reduced ejection fraction <35% and severe mitral regurg.  JAY  showed severely reduced ejection fraction with moderate to severe aortic regurg and severe mitral regurgitation.  Cardiology started patient on goal-directed medical therapy including Entresto, Aldactone, Jardiance and Toprol-XL.  Lisinopril discontinued.  CT surgery consulted.     Patient extubated to room air on .  Hospitalist consulted to asume care as primary.     Patient is seen and examined at the bedside.  Reports feeling better and would like to go home.  Denies chest pain, palpitation, nausea, vomiting.  Denies shortness of breath.    Subjective & 24hr Events:   No acute events overnight. Tanika states she is feeling much better today.  Actually now feels essentially her usual self.  No additional complaints at this time.     Slept: Well    Eating: Well    Drinking: Well    Stooling: 
   [facility] CARDIOLOGY PROGRESS NOTE    7/3/2024 7:20 AM    Admit Date: 6/29/2024        Subjective:   Stable overnight without angina, CHF, or palpitations. Vitals stable and controlled. No other complaints overnight. Tolerating meds well.       Objective:      Vitals:    07/02/24 2230 07/02/24 2245 07/02/24 2339 07/03/24 0459   BP:   127/61 135/75   Pulse: 65 69 67 66   Resp: 28  18 18   Temp:   97.5 °F (36.4 °C) 98.2 °F (36.8 °C)   TempSrc:   Oral    SpO2:   96% 100%   Weight:   69.1 kg (152 lb 5.4 oz) 69.8 kg (153 lb 14.1 oz)   Height:   1.626 m (5' 4\")        Physical Exam:  Neck- supple, 6 to 7 cm JVD at 60 degrees, HJR to 10 cm  CV- regular rate and rhythm,2-3/6 MR murmur, soft AI murmur, no S3  Lung- clear bilaterally, mildly decreased bibasilar but no crackles or wheezing  Abd- soft, nontender, nondistended  Ext- no distal edema  Skin- warm and dry    Data Review:   Pertinent lab and noninvasive imaging over the past 24 hours reviewed and evaluated.    Recent Labs     07/01/24  0426 07/01/24  1552 07/02/24  0619 07/03/24  0338     --  141 139   K 3.0*   < > 4.1 3.8   MG 1.9   < > 2.5* 2.3   BUN 13  --  16 17   WBC 11.7*  --  10.7  --    HGB 12.6  --  13.3  --    HCT 37.9  --  40.5  --      --  195  --     < > = values in this interval not displayed.     No results found for: \"LDL\", \"LDLDIRECT\"  Left and right heart catheterization 6/30/2024:  1.  Minimal coronary artery disease with previously placed LAD diagonal stenting being widely patent with minimal in-stent restenosis  2.  Severely reduced ejection fraction less than 35% with what appears to be 4+ MR  3.  Normal right heart parameters with normal wedge pressure and normal LVEDP.  Wedge pressure was 12 LVEDP was 9.  Cardiac output was 2.8 L/min     Transesophageal echo 6/30/2024:  Left Ventricle Reduced left ventricular systolic function with a visually estimated EF of 30 - 35%. Left ventricle is moderately dilated. Normal wall thickness. 
   [facility] CARDIOLOGY PROGRESS NOTE    7/1/2024 7:04 AM    Admit Date: 6/29/2024        Subjective:   Stable and awake on vent.  Nods head appropriately.  Obeys verbal commands.  Comfortable on vent.  Sinus tach with PACs and PVCs noted.  BP elevated.      Objective:      Vitals:    07/01/24 0600 07/01/24 0606 07/01/24 0621 07/01/24 0652   BP:  (!) 154/75 (!) 154/72 (!) 156/67   Pulse:  (!) 117 (!) 108 96   Resp:  28 14 11   Temp:       TempSrc:       SpO2:  98% 98% 97%   Weight: 75.9 kg (167 lb 4.8 oz)      Height:           Physical Exam:  Neck- supple, difficult to assess JVD  CV- regular rate and rhythm, 2-3/6 LSB murmur, no S3  Lung- clear bilaterally anterolaterally, no crackles or wheezing  Abd- soft, nontender, nondistended  Ext- 1+ anterior distal edema  Skin- warm and dry    Data Review:   Pertinent lab and noninvasive imaging over the past 24 hours reviewed and evaluated.    Recent Labs     06/30/24  0404 07/01/24  0426    138   K 3.6 3.0*   MG 2.2 1.9   BUN 11 13   WBC 11.6* 11.7*   HGB 13.0 12.6   HCT 39.8 37.9    155     No results found for: \"LDL\", \"LDLDIRECT\"    Left and right heart catheterization 6/30/2024:  1.  Minimal coronary artery disease with previously placed LAD diagonal stenting being widely patent with minimal in-stent restenosis  2.  Severely reduced ejection fraction less than 35% with what appears to be 4+ MR  3.  Normal right heart parameters with normal wedge pressure and normal LVEDP.  Wedge pressure was 12 LVEDP was 9.  Cardiac output was 2.8 L/min    Transesophageal echo 6/30/2024:  Left Ventricle Reduced left ventricular systolic function with a visually estimated EF of 30 - 35%. Left ventricle is moderately dilated. Normal wall thickness. Global hypokinesis present.   Left Atrium Left atrium is dilated.   Right Ventricle Right ventricle size is normal. Normal systolic function.   Right Atrium Right atrium is dilated.   Aortic Valve Trileaflet valve. Moderate to 
   [facility] CARDIOLOGY PROGRESS NOTE    7/4/2024 1:33 PM    Admit Date: 6/29/2024        Subjective:   Breathing better, spouse at the bedside.  No CP, pressure.  SUMNER better now.        Objective:      Vitals:    07/04/24 0452 07/04/24 0756 07/04/24 0842 07/04/24 1143   BP: (!) 130/51 (!) 126/52 118/69 132/71   Pulse: 65 65 62 58   Resp: 18 19  17   Temp: 98.6 °F (37 °C) 97.8 °F (36.6 °C)  98.1 °F (36.7 °C)   TempSrc:  Oral  Oral   SpO2: 97% 93%  93%   Weight: 69.8 kg (153 lb 14.1 oz)      Height:           Physical Exam:  Gen: A and O x 3  Neck- supple, no JVD  CV- regular rate and rhythm,3/6 SM  Lung- dec BS in the bases  Abd- soft, nontender, nondistended  Ext- no distal edema  Skin- warm and dry    Data Review:   Pertinent lab and noninvasive imaging over the past 24 hours reviewed and evaluated.    Recent Labs     07/02/24  0619 07/03/24  0338 07/04/24  0358    139 136   K 4.1 3.8 3.0*   MG 2.5* 2.3 2.1   BUN 16 17 23   WBC 10.7  --  7.7   HGB 13.3  --  13.7   HCT 40.5  --  41.1     --  261       No results found for: \"LDL\", \"LDLDIRECT\"  Left and right heart catheterization 6/30/2024:  1.  Minimal coronary artery disease with previously placed LAD diagonal stenting being widely patent with minimal in-stent restenosis  2.  Severely reduced ejection fraction less than 35% with what appears to be 4+ MR  3.  Normal right heart parameters with normal wedge pressure and normal LVEDP.  Wedge pressure was 12 LVEDP was 9.  Cardiac output was 2.8 L/min     Transesophageal echo 6/30/2024:  Left Ventricle Reduced left ventricular systolic function with a visually estimated EF of 30 - 35%. Left ventricle is moderately dilated. Normal wall thickness. Global hypokinesis present.   Left Atrium Left atrium is dilated.   Right Ventricle Right ventricle size is normal. Normal systolic function.   Right Atrium Right atrium is dilated.   Aortic Valve Trileaflet valve. Moderate to severe regurgitation.   Mitral Valve 
  7/16/2024 9:51 AM    Admit Date: 6/29/2024    Admit Diagnosis: Heart murmur [R01.1]  Acute respiratory failure with hypoxia (HCC) [J96.01]  Acute on chronic respiratory failure with hypoxia (HCC) [J96.21]  Aortic valve stenosis, nonrheumatic [I35.0]      Subjective:   Denies chest pain or shortness of breath.  More fatigued today      Objective:    BP (!) 146/73   Pulse 79   Temp 99.1 °F (37.3 °C) (Oral)   Resp 24   Ht 1.626 m (5' 4\")   Wt 77.2 kg (170 lb 3.1 oz)   SpO2 94%   BMI 29.21 kg/m²     Physical Exam:  General-Well Developed, Well Nourished, No Acute Distress, Alert & Oriented x 3, appropriate mood.  Neck- supple, no JVD  CV- regular rate and rhythm no MRG  Lung- clear bilaterally  Abd- soft, nontender, nondistended  Ext- no edema bilaterally.  Skin- warm and dry        Data Review:   Recent Labs     07/16/24  0518 07/16/24  0609     --    K 3.2*  --    BUN 9  --    WBC  --  12.5*   HGB  --  11.4*   HCT  --  34.9*   PLT  --  143*       Assessment/Plan:     Active Hospital Problems    S/P MVR (mitral valve replacement) doing well.  Maintaining normal sinus rhythm.  Will increase ambulation      Prolonged QT interval    PM VT-will replace potassium and magnesium.  Will ask EP to see with ejection fraction of 20 to see if they feel she needs defibrillator before discharge      S/P AVR      Aortic valve stenosis, nonrheumatic      Atelectasis      Hypoxia      Frailty      Encounter for palliative care      Acute on chronic respiratory failure with hypoxia (HCC)      Acute pulmonary edema (HCC)      Pleural effusion      Acute respiratory failure with hypoxia (HCC)      Coronary atherosclerosis of native coronary vessel        
  7/18/2024 12:52 PM    Admit Date: 6/29/2024    Admit Diagnosis: Heart murmur [R01.1]  Acute respiratory failure with hypoxia (HCC) [J96.01]  Acute on chronic respiratory failure with hypoxia (HCC) [J96.21]  Aortic valve stenosis, nonrheumatic [I35.0]      Subjective:   No cp or sob      Objective:    BP (!) 111/59   Pulse 66   Temp 97.2 °F (36.2 °C) (Temporal)   Resp 18   Ht 1.626 m (5' 4\")   Wt 70.6 kg (155 lb 10.3 oz)   SpO2 95%   BMI 26.72 kg/m²     Physical Exam:  General-Well Developed, Well Nourished, No Acute Distress, Alert & Oriented x 3, appropriate mood.  Neck- supple, no JVD  CV- regular rate and rhythm no MRG  Lung- clear bilaterally  Abd- soft, nontender, nondistended  Ext- no edema bilaterally.  Skin- warm and dry        Data Review:   Recent Labs     07/16/24  0609 07/17/24  0355 07/18/24  0450   NA  --  138  --    K  --  4.0 3.7   BUN  --  6*  --    WBC 12.5*  --   --    HGB 11.4*  --   --    HCT 34.9*  --   --    *  --   --        Assessment/Plan:     Active Hospital Problems    S/P MVR (mitral valve replacement)Stable. Continue current medical therapy.        Atrial flutter (HCC)      Prolonged QT interval      NSVT (nonsustained ventricular tachycardia) (HCC)Stable. Continue current medical therapy.  S/p ICD      S/P AVR      Aortic valve stenosis, nonrheumatic      Atelectasis      Hypoxia      Frailty      Encounter for palliative care      Acute on chronic respiratory failure with hypoxia (HCC)      Acute pulmonary edema (HCC)      Pleural effusion      Acute respiratory failure with hypoxia (HCC)      Coronary atherosclerosis of native coronary vessel        
  Physician Progress Note      PATIENT:               JONAS WESTON  CSN #:                  [id number]  :                       1945  ADMIT DATE:       2024 10:42 AM  DISCH DATE:  RESPONDING  PROVIDER #:        Sapphire JACINTO          QUERY TEXT:    Pt is status post AVR and MVR. Pt noted to have decrease in HGB and platelets   requiring transfusions. If possible, please document in the progress notes and   discharge summary if you are evaluating and/or treating any of the following:    The medical record reflects the following:  Risk Factors: 78 yr, s/p AVR and MVR, platelet 76  Clinical Indicators: hgb on  at 0352 14.6 hgb decreased to 9.7 on    1532, per  PN \"  Patient also had a fair amount of bleeding   postoperatively and required 2 units of packed cells 4 of FFP 1 of platelets   and 2 cryo.  Bleeding appears.  Patient's chest tube drainage now 10 to 20   cc/h.\"  Treatment: 2 units PRBC, FFP ,1 unit of platelets, 2 units cryoprecipitate,   daily CBC,        [email or web address]  Options provided:  -- Acute blood loss anemia  -- Postoperative acute blood loss anemia  -- Other - I will add my own diagnosis  -- Disagree - Not applicable / Not valid  -- Disagree - Clinically unable to determine / Unknown  -- Refer to Clinical Documentation Reviewer    PROVIDER RESPONSE TEXT:    This patient has postoperative acute blood loss anemia.    Query created by: SWAPNA VILLALOBOS on 2024 12:36 PM      Electronically signed by:  Sapphire JACINTO 2024 2:20 PM          
  Physician Progress Note      PATIENT:               JONAS WESTON  CSN #:                  [id number]  :                       1945  ADMIT DATE:       2024 10:42 AM  DISCH DATE:  RESPONDING  PROVIDER #:        Nicolas Qiuspe DO          QUERY TEXT:    Pt admitted with acute hypoxemic respiratory failure and has HFrEF New   diagnosis documented on  PN. If possible, please document in progress   notes and discharge summary further specificity regarding the type and acuity   of CHF:    The medical record reflects the following:  Risk Factors: 78 yr, Mitral?Valve: Severe regurgitation, Aortic?Valve:   Trileaflet valve. Calcified cusps. Moderate to severe regurgitation, DIANE, HTN    Clinical Indicators: ECHO shows Left?Ventricle: Reduced left ventricular   systolic function with a visually estimated EF of 35 - 40%. Left ventricle   size is normal. Increased wall thickness. Global hypokinesis present.  There   is regional variation noted in the inferior and inferolateral wall segments.   Abnormal diastolic function, ProBNP 2,154, cxr Mild pulmonary congestion.    Treatment: JAY, LHC and RHC, IV Lasix BID, Entresto, Aldactone, CT surgery   consult          [email or web address]  Options provided:  -- Acute Systolic CHF/HFrEF  -- Acute Diastolic CHF/HFpEF  -- Acute Systolic and Diastolic CHF  -- Other - I will add my own diagnosis  -- Disagree - Not applicable / Not valid  -- Disagree - Clinically unable to determine / Unknown  -- Refer to Clinical Documentation Reviewer    PROVIDER RESPONSE TEXT:    Mitral regurgitation    Query created by: SWAPNA VILLALOBOS on 2024 1:15 PM      Electronically signed by:  Nicolas Quispe DO 2024 1:35 AM          
  TIMEOUT performed prior to extubation on Tanika Medina with RT, primary RN, and charge RN present on 7/12/2024 at 00:05.     Per anesthesia team on admission, patient's intubation was normal.    ABG results as follows:     Latest Reference Range & Units 07/11/24 23:48   FIO2 % 30   POC HCO3 22 - 26 MMOL/L 25.2   POC O2 SAT 95 - 98 % 96.4   POC pCO2 35 - 45 MMHG 42.8   POC pH 7.35 - 7.45   7.38   POC PO2 75 - 100 MMHG 87         The patient is hemodynamically stable and can demonstrate the following on a consistent basis:  follow commands , elevate head off the pillow, nods appropriately to questions.      Dr. Jimenes notified of weaning parameters and order to extubate obtained.     Patient extubated by (RT name) Lalita westbrook (Type of O2 Device) Noa at (Amount of of O2) 40L/40% without complication.   
  TRANSFER - IN REPORT:    Verbal report received from CCU RN on Tanika Medina  being received from 3112 for ordered procedure      Report consisted of patient’s Situation, Background, Assessment and   Recommendations(SBAR).     Information from the following report(s) Event Log was reviewed with the receiving nurse.    Opportunity for questions and clarification was provided.      Assessment completed upon patient’s arrival to unit and care assume        
  TRANSFER - IN REPORT:    Verbal report received from RN(name) on Tanika Medina  being received from 4th(unit) for ordered procedure      Report consisted of patient’s Situation, Background, Assessment and   Recommendations(SBAR).     Information from the following report(s) Nurse Handoff Report was reviewed with the receiving nurse.    Opportunity for questions and clarification was provided.      Assessment completed upon patient’s arrival to unit and care assume        
0400- This RN found patient had pulled IV out and placed in sink.  Patient assisted back to bed. Guaze and tape applied. Bed alarm applied.  This RN explained to patient the need for an IV.  Patient stated \"If you put one back in me now I will rip it out\".    0600- Attempt x1 IV from this RN.  Unsuccessful.  Patient Aox4, calm and cooperative.  Attempt x2 IV from DARLENE Garcia. Unsuccessful  
1252: Pt and family very upset and states they have not spoken to anybody about the timeline for valve replacement surgery and pt has been here 10 days, stating they may leave and take the pt somewhere else to have valve replacement. LINDEN Cunha notified of pt and family's frustrations and LINDEN Cunha states they alerted MD Vasquez and LINDEN Hanson of pt and family's frustrations.         
4 Eyes Skin Assessment     NAME:  Tanika Medina  YOB: 1945  MEDICAL RECORD NUMBER:  [id number]    The patient is being assessed for  Transfer to New Unit    I agree that at least one RN has performed a thorough Head to Toe Skin Assessment on the patient. ALL assessment sites listed below have been assessed.      Areas assessed by both nurses:    Head, Face, Ears, Shoulders, Back, Chest, Arms, Elbows, Hands, Sacrum. Buttock, Coccyx, Ischium, and Legs. Feet and Heels    Patient's skin is clean, dry, and intact. Heels and sacrum without any redness or injury. Allevyn on sacrum for prevention. R groin incision s/p LHC. Scattered scars and bruising. Redness under breasts.         Does the Patient have a Wound? No noted wound(s)       Teernce Prevention initiated by RN: Yes  Wound Care Orders initiated by RN: No    Pressure Injury (Stage 3,4, Unstageable, DTI, NWPT, and Complex wounds) if present, place Wound referral order by RN under : No    New Ostomies, if present place, Ostomy referral order under : No     Nurse 1 eSignature: Electronically signed by Martha Morales RN on 7/2/24 at 11:45 PM EDT    **SHARE this note so that the co-signing nurse can place an eSignature**    Nurse 2 eSignature: Electronically signed by Sallie Ramos RN on 7/2/24 at 11:46 PM EDT    
4 Eyes Skin Assessment     NAME:  Tanika Medina  YOB: 1945  MEDICAL RECORD NUMBER:  [id number]    The patient is being assessed for  Admission    I agree that at least one RN has performed a thorough Head to Toe Skin Assessment on the patient. ALL assessment sites listed below have been assessed.      Areas assessed by both nurses:    Sacrum. Buttock, Coccyx, Ischium        Does the Patient have a Wound? No noted wound(s)       Terence Prevention initiated by RN: No  Wound Care Orders initiated by RN: No    Pressure Injury (Stage 3,4, Unstageable, DTI, NWPT, and Complex wounds) if present, place Wound referral order by RN under : No    New Ostomies, if present place, Ostomy referral order under : No     Nurse 1 eSignature: Electronically signed by Patti Watson RN on 7/15/24 at 6:41 PM EDT    **SHARE this note so that the co-signing nurse can place an eSignature**    Nurse 2 eSignature: Electronically signed by Tj Franklin RN on 7/15/24 at 6:42 PM EDT    
ACUTE OCCUPATIONAL THERAPY GOALS:   (Developed with and agreed upon by patient and/or caregiver.)    New goals 7/15  1. Pt will toilet with SBA   2. Pt will complete functional mobility for ADLs with SBA using AD as needed  3. Pt will complete lower body dressing with SBA using AE as needed  4. Pt will complete grooming and hygiene at sink with SBA  5. Pt will tolerate 23 minutes functional activity with min or fewer rest breaks to promote increased endurance for ADLs  6. Pt will maintain sternal precautions without cues    Timeframe: 7 Visits  OCCUPATIONAL THERAPY Daily Note and Re-evaluation       OT Visit Days: 1  Acknowledge Orders  Time  OT Charge Capture  Rehab Caseload Tracker      Tanika Medina is a 78 y.o. female   PRIMARY DIAGNOSIS: S/P AVR  Heart murmur [R01.1]  Acute respiratory failure with hypoxia (HCC) [J96.01]  Acute on chronic respiratory failure with hypoxia (HCC) [J96.21]  Aortic valve stenosis, nonrheumatic [I35.0]  Procedure(s) (LRB):  AORTIC VALVE REPLACEMENT; MITRAL VALVE REPLACEMENT (N/A)  TRANSESOPHAGEAL ECHOCARDIOGRAM (N/A)  4 Days Post-Op  Reason for Referral: Generalized Muscle Weakness (M62.81)  Inpatient: Payor: [employer] / [place] / Product Type: *No Product type* /     ASSESSMENT:     REHAB RECOMMENDATIONS:   Recommendation to date pending progress:  Setting:  Short-term Rehab    Equipment:    To Be Determined     ASSESSMENT:  Ms. Medina was seen for re-evaluation following AVR/ MVR. Pt presented with deficits in activity tolerance, mobility, balance, and limitations of post op restrictions impacting ADLs. Pt educated on sternal precautions and on adaptive techniques to maintain, required frequent cues to avoid pushing through arms during mobility. Pt required min A for functional mobility using rolling walker, CGA for grooming at sink and requires assistance for bathing, dressing, and toileting d/t deficits. Pt generally deconditioned and fatigued 
ACUTE OCCUPATIONAL THERAPY GOALS:   (Developed with and agreed upon by patient and/or caregiver.)  1. Pt will toilet with SBA   2. Pt will complete functional mobility for ADLs with SBA using AD as needed  3. Pt will complete lower body dressing with SBA using AE as needed  4. Pt will complete grooming and hygiene at sink with SBA  5. Pt will tolerate 23 minutes functional activity with min or fewer rest breaks to promote increased endurance for ADLs  6. Pt will maintain sternal precautions without cues     Timeframe: 7 Visits    OCCUPATIONAL THERAPY: Daily Note AM   OT Visit Days: 2   Time In/Out  OT Charge Capture  Rehab Caseload Tracker  OT Orders    Tanika Medina is a 78 y.o. female   PRIMARY DIAGNOSIS: S/P AVR  Heart murmur [R01.1]  Acute respiratory failure with hypoxia (HCC) [J96.01]  Acute on chronic respiratory failure with hypoxia (HCC) [J96.21]  Aortic valve stenosis, nonrheumatic [I35.0]  Procedure(s) (LRB):  Insert ICD dual (N/A)  4 Days Post-Op  Inpatient: Payor: [employer] [employer] / [place] / Product Type: *No Product type* /     ASSESSMENT:     REHAB RECOMMENDATIONS:   Recommendation to date pending progress:  Setting:  Home Health Therapy    Equipment:    To Be Determined     ASSESSMENT:  Ms. Medina is doing well today. Pt presents supine upon arrival. No assistance needed for bed mobility. Pt able to enter bathroom with SBA. Completed toileting with supervision. Pt performed functional mobility in hallway with rolling walker. Pt returned to room and was instructed in UE exercises. Tolerated exercises well. Making some progress with goals. Will continue to benefit from skilled OT during stay.        SUBJECTIVE:     Ms. Medina states, \"I will do all that later\"- referring to grooming at the sink     Social/Functional Lives With: Spouse  Type of Home: House  Home Equipment: None  ADL Assistance: Independent  Homemaking Assistance: Independent  Homemaking Responsibilities: 
ACUTE OCCUPATIONAL THERAPY GOALS:   (Developed with and agreed upon by patient and/or caregiver.)  Pt will complete grooming task standing EOS with MOD I and adaptive equipment as needed.  Pt will lower body dress and bathe with MOD I and adaptive equipment as needed.  Pt will complete OT activity for 25 mintues with 1-2 rest breaks to increase activity tolerance to perform ADLs and functional transfers.  Pt will complete functional transfer with MOD I and adaptive equipment as needed.   Pt will complete toilet task with MOD I and adaptive equipment as needed.  Pt will complete functional mobility for household distances with MOD I and adaptive equipment as needed.      Timeframe: 7 Visits    OCCUPATIONAL THERAPY: Daily Note AM   OT Visit Days: 3   Time In/Out  OT Charge Capture  Rehab Caseload Tracker  OT Orders    Tanika Medina is a 78 y.o. female   PRIMARY DIAGNOSIS: Acute on chronic respiratory failure with hypoxia (HCC)  Heart murmur [R01.1]  Acute respiratory failure with hypoxia (HCC) [J96.01]  Acute on chronic respiratory failure with hypoxia (HCC) [J96.21]  Procedure(s) (LRB):  Left and right heart cath / coronary angiography (N/A)  5 Days Post-Op  Inpatient: Payor: [employer] [employer] / [place] / Product Type: *No Product type* /     ASSESSMENT:     REHAB RECOMMENDATIONS:   Recommendation to date pending progress:  Setting:  Home Health Therapy    Equipment:    To Be Determined     ASSESSMENT:  Ms. Medina presents below baseline in overall strength, functional mobility, ADLs, and activity tolerance. Reports already completed ADLs prior to treatment, agreeable to go for a walk. Pt overall supervision for bed mobility and SBA-CGA rolling walker for functional transfers and mobility of household/community distances in hallway, standing rest breaks required, fair dynamic balance. Pt with no signs of SOB or dizziness throuhgout. Pt is doing very well, progressing toward goals, has good 
ACUTE OCCUPATIONAL THERAPY GOALS:   (Developed with and agreed upon by patient and/or caregiver.)  Pt will complete grooming task standing EOS with MOD I and adaptive equipment as needed.  Pt will lower body dress and bathe with MOD I and adaptive equipment as needed.  Pt will complete OT activity for 25 mintues with 1-2 rest breaks to increase activity tolerance to perform ADLs and functional transfers.  Pt will complete functional transfer with MOD I and adaptive equipment as needed.   Pt will complete toilet task with MOD I and adaptive equipment as needed.  Pt will complete functional mobility for household distances with MOD I and adaptive equipment as needed.     Timeframe: 7 Visits  OCCUPATIONAL THERAPY Initial Assessment, Daily Note, and PM       OT Visit Days: 1  Acknowledge Orders  Time  OT Charge Capture  Rehab Caseload Tracker      Tanika Medina is a 78 y.o. female   PRIMARY DIAGNOSIS: Acute on chronic respiratory failure with hypoxia (HCC)  Heart murmur [R01.1]  Acute respiratory failure with hypoxia (HCC) [J96.01]  Acute on chronic respiratory failure with hypoxia (HCC) [J96.21]  Procedure(s) (LRB):  Left and right heart cath / coronary angiography (N/A)  1 Day Post-Op  Reason for Referral: Generalized Muscle Weakness (M62.81)  Inpatient: Payor: [employer] / [place] / Product Type: *No Product type* /     ASSESSMENT:     REHAB RECOMMENDATIONS:   Recommendation to date pending progress:  Setting:  Home Health Therapy    Equipment:    To Be Determined     ASSESSMENT:  Ms. Medina has a history of CAD w 12/2021 [facility] with patent stent in LAD, 12/2021 echo w EF 55-60%, IVCD, mild AS, mod AR, mild LAE, mod MR, and possible DIANE. She presented tripoding with severe SOB and respiratory distress. Of note, lives with  in 1SH, 1STE, with walk-in shower. IND at baseline. Received supine in bed, alert, and resting on RA.  Today, not oriented and pleasantly confused. Sup > sit 
ACUTE PHYSICAL THERAPY GOALS:   (Developed with and agreed upon by patient and/or caregiver.)  ALL GOALS ONGOING 7/13/2024 and modified gait distance.    LTG:  (1.)Ms. Medina will move from supine to sit and sit to supine , scoot up and down, and roll side to side in bed with INDEPENDENCE within 7 treatment day(s).    (2.)Ms. Medina will transfer from bed to chair and chair to bed with MODIFIED INDEPENDENCE using the least restrictive device within 7 treatment day(s).    (3.)Ms. Medina will ambulate with MODIFIED INDEPENDENCE for 1000 feet with the least restrictive device within 7 treatment day(s).  (4.)Ms. Medina will participate in therapeutic activity/exercises x 38 minutes for increased activity tolerance with SpO2 above 90% within 7 treatment days.    PHYSICAL THERAPY: Daily Note PM  (Link to Caseload Tracking: PT Visit Days : 1  Time In/Out PT Charge Capture  Rehab Caseload Tracker  Orders    Tanika Medina is a 78 y.o. female   PRIMARY DIAGNOSIS: S/P AVR  Heart murmur [R01.1]  Acute respiratory failure with hypoxia (HCC) [J96.01]  Acute on chronic respiratory failure with hypoxia (HCC) [J96.21]  Aortic valve stenosis, nonrheumatic [I35.0]  Procedure(s) (LRB):  AORTIC VALVE REPLACEMENT; MITRAL VALVE REPLACEMENT (N/A)  TRANSESOPHAGEAL ECHOCARDIOGRAM (N/A)  2 Days Post-Op  Inpatient: Payor: [facility] MEDICARE / [place] / Product Type: *No Product type* /     ASSESSMENT:     REHAB RECOMMENDATIONS:   Recommendation to date pending progress:  Setting:  Home Health Therapy cardiac     Equipment:    To Be Determined     ASSESSMENT:  Ms. Medina for AM reevaluation after hold for pacing and complication last 3 days.   In cardiac chair on RA upon entering the room and agreeable to therapy. Reevaluation then, pt performed all activity at CGA x 1 to MIN A x 1. level(s) including ambulation of ~220 ft  in-room and CVICU hallway. Stable ambulation and fair standing balance.   Pt performed all 
ACUTE PHYSICAL THERAPY GOALS:   (Developed with and agreed upon by patient and/or caregiver.)  ALL GOALS ONGOING 7/13/2024 and modified gait distance.   7/22/2024 Continue Goals with added goal #5  LTG:  (1.)Ms. Medina will move from supine to sit and sit to supine , scoot up and down, and roll side to side in bed with INDEPENDENCE within 7 treatment day(s).    (2.)Ms. Medina will transfer from bed to chair and chair to bed with MODIFIED INDEPENDENCE using the least restrictive device within 7 treatment day(s).    (3.)Ms. Medina will ambulate with MODIFIED INDEPENDENCE for 500 feet with the least restrictive device within 7 treatment day(s).  (4.)Ms. Medina will participate in therapeutic activity/exercises x 25 minutes for increased activity tolerance with SpO2 above 90% within 7 treatment days.  5.  Patient will participate in standing balance ex's with CGA and demonstration for 10' to increase balance within 7 treatment days.    PHYSICAL THERAPY: Daily Note AM   (Link to Caseload Tracking: PT Visit Days : 2  Time In/Out PT Charge Capture  Rehab Caseload Tracker  Orders    Tanika Medina is a 78 y.o. female   PRIMARY DIAGNOSIS: S/P AVR  Heart murmur [R01.1]  Acute respiratory failure with hypoxia (HCC) [J96.01]  Acute on chronic respiratory failure with hypoxia (HCC) [J96.21]  Aortic valve stenosis, nonrheumatic [I35.0]  Procedure(s) (LRB):  Insert ICD dual (N/A)  6 Days Post-Op  Inpatient: Payor: [employer]IC[employer] / [place] / Product Type: *No Product type* /     ASSESSMENT:     REHAB RECOMMENDATIONS:   Recommendation to date pending progress:  Setting:  Inpatient Rehab Facility    Equipment:    None     ASSESSMENT:  Ms. Medina was supine in bed on arrival and agreeable to therapy. Supine to sit on edge of bed with CGA. She ambulated into restroom and performed all self care without assist. She requested to return to bed but encouraged her to ambulate and sit up in the chair. She was able 
ACUTE PHYSICAL THERAPY GOALS:   (Developed with and agreed upon by patient and/or caregiver.)  ALL GOALS ONGOING 7/13/2024 and modified gait distance.   7/22/2024 Continue Goals with added goal #5  LTG:  (1.)Ms. Medina will move from supine to sit and sit to supine , scoot up and down, and roll side to side in bed with INDEPENDENCE within 7 treatment day(s).    (2.)Ms. Medina will transfer from bed to chair and chair to bed with MODIFIED INDEPENDENCE using the least restrictive device within 7 treatment day(s).    (3.)Ms. Medina will ambulate with MODIFIED INDEPENDENCE for 500 feet with the least restrictive device within 7 treatment day(s).  (4.)Ms. Medina will participate in therapeutic activity/exercises x 25 minutes for increased activity tolerance with SpO2 above 90% within 7 treatment days.  5.  Patient will participate in standing balance ex's with CGA and demonstration for 10' to increase balance within 7 treatment days.  ________________________________________________________________________________________________        PHYSICAL THERAPY Initial Assessment, Daily Note, and AM  (Link to Caseload Tracking: PT Visit Days : 1  Acknowledge Orders  Time In/Out  PT Charge Capture  Rehab Caseload Tracker  Sternal Precautions  PM Precautions  Tanika Medina is a 78 y.o. female   PRIMARY DIAGNOSIS: S/P AVR  Heart murmur [R01.1]  Acute respiratory failure with hypoxia (HCC) [J96.01]  Acute on chronic respiratory failure with hypoxia (HCC) [J96.21]  Aortic valve stenosis, nonrheumatic [I35.0]  Procedure(s) (LRB):  Insert ICD dual (N/A)  5 Days Post-Op  Reason for Referral: Generalized Muscle Weakness (M62.81)  Difficulty in walking, Not elsewhere classified (R26.2)  Inpatient: Payor: [employer]IC[employer] / [place] / Product Type: *No Product type* /     ASSESSMENT:     REHAB RECOMMENDATIONS:   Recommendation to date pending progress:  Setting:  Inpatient Rehab Facility    Equipment:    To Be 
ACUTE PHYSICAL THERAPY GOALS:   (Developed with and agreed upon by patient and/or caregiver.)  ALL GOALS ONGOING 7/16/2024 and modified gait distance.    LTG:  (1.)Ms. Medina will move from supine to sit and sit to supine , scoot up and down, and roll side to side in bed with INDEPENDENCE within 7 treatment day(s).    (2.)Ms. Medina will transfer from bed to chair and chair to bed with MODIFIED INDEPENDENCE using the least restrictive device within 7 treatment day(s).    (3.)Ms. Medina will ambulate with MODIFIED INDEPENDENCE for 1000 feet with the least restrictive device within 7 treatment day(s).  (4.)Ms. Medina will participate in therapeutic activity/exercises x 38 minutes for increased activity tolerance with SpO2 above 90% within 7 treatment days.    PHYSICAL THERAPY: Daily Note PM  (Link to Caseload Tracking: PT Visit Days : 2  Time In/Out PT Charge Capture  Rehab Caseload Tracker  Orders    Tanika Medina is a 78 y.o. female   PRIMARY DIAGNOSIS: S/P AVR  Heart murmur [R01.1]  Acute respiratory failure with hypoxia (HCC) [J96.01]  Acute on chronic respiratory failure with hypoxia (HCC) [J96.21]  Aortic valve stenosis, nonrheumatic [I35.0]  Procedure(s) (LRB):  AORTIC VALVE REPLACEMENT; MITRAL VALVE REPLACEMENT (N/A)  TRANSESOPHAGEAL ECHOCARDIOGRAM (N/A)  5 Days Post-Op  Inpatient: Payor: [facility] [employer] / [place] / Product Type: *No Product type* /     ASSESSMENT:     REHAB RECOMMENDATIONS:   Recommendation to date pending progress:  Setting:  Home Health Therapy     Equipment:    To Be Determined     ASSESSMENT:  Ms. Medina is sitting in the recliner and agreeable to therapy.  Has external pacer and wound vac.  Sit to stand with min assist using the momentum method to stand at the  walker, gait training with min assist x 60 feet total with one sitting rest break as the patient states that she feels dizzy.  Patient is returned to the room and request to lay down in the bed,  RN 
ACUTE PHYSICAL THERAPY GOALS:   (Developed with and agreed upon by patient and/or caregiver.)  ALL GOALS ONGOING 7/19/2024 and modified gait distance.    LTG:  (1.)Ms. Medina will move from supine to sit and sit to supine , scoot up and down, and roll side to side in bed with INDEPENDENCE within 7 treatment day(s).    (2.)Ms. Medina will transfer from bed to chair and chair to bed with MODIFIED INDEPENDENCE using the least restrictive device within 7 treatment day(s).    (3.)Ms. Medina will ambulate with MODIFIED INDEPENDENCE for 1000 feet with the least restrictive device within 7 treatment day(s).  (4.)Ms. Medina will participate in therapeutic activity/exercises x 38 minutes for increased activity tolerance with SpO2 above 90% within 7 treatment days.    PHYSICAL THERAPY: Daily Note AM  (Link to Caseload Tracking: PT Visit Days : 4  Time In/Out PT Charge Capture  Rehab Caseload Tracker  Orders    Tanika Medina is a 78 y.o. female   PRIMARY DIAGNOSIS: S/P AVR  Heart murmur [R01.1]  Acute respiratory failure with hypoxia (HCC) [J96.01]  Acute on chronic respiratory failure with hypoxia (HCC) [J96.21]  Aortic valve stenosis, nonrheumatic [I35.0]  Procedure(s) (LRB):  Insert ICD dual (N/A)  2 Days Post-Op  Inpatient: Payor: [employer] MEDICARE / [place] / Product Type: *No Product type* /     ASSESSMENT:     REHAB RECOMMENDATIONS:   Recommendation to date pending progress:  Setting:  Home Health Therapy     Equipment:    To Be Determined     ASSESSMENT:  Ms. Medina is making slow progress toward goals with increased gait  distances and mobility.  She performed bed mobility and scooting with CGA and minimal verbal cueing.  Assisted in adjusting the patient's LUE sling before gait.  She then performed ambulation with HHA for balance and min A.  She took several seated rest breaks during ambulation.         SUBJECTIVE:   Ms. Medina states, \"I'll do what I can\"     Social/Functional Lives With: 
ACUTE PHYSICAL THERAPY GOALS:   (Developed with and agreed upon by patient and/or caregiver.)  ALL GOALS ONGOING 7/20/2024 and modified gait distance.    LTG:  (1.)Ms. Medina will move from supine to sit and sit to supine , scoot up and down, and roll side to side in bed with INDEPENDENCE within 7 treatment day(s).    (2.)Ms. Medina will transfer from bed to chair and chair to bed with MODIFIED INDEPENDENCE using the least restrictive device within 7 treatment day(s).    (3.)Ms. Medina will ambulate with MODIFIED INDEPENDENCE for 1000 feet with the least restrictive device within 7 treatment day(s).  (4.)Ms. Medina will participate in therapeutic activity/exercises x 38 minutes for increased activity tolerance with SpO2 above 90% within 7 treatment days.    PHYSICAL THERAPY: Daily Note PM  (Link to Caseload Tracking: PT Visit Days : 5  Time In/Out PT Charge Capture  Rehab Caseload Tracker  Orders    Tanika Medina is a 78 y.o. female   PRIMARY DIAGNOSIS: S/P AVR  Heart murmur [R01.1]  Acute respiratory failure with hypoxia (HCC) [J96.01]  Acute on chronic respiratory failure with hypoxia (HCC) [J96.21]  Aortic valve stenosis, nonrheumatic [I35.0]  Procedure(s) (LRB):  Insert ICD dual (N/A)  3 Days Post-Op  Inpatient: Payor: [employer] MEDICARE / [place] / Product Type: *No Product type* /     ASSESSMENT:     REHAB RECOMMENDATIONS:   Recommendation to date pending progress:  Setting:  Home Health Therapy     Equipment:    To Be Determined     ASSESSMENT:  Ms. Medina is making slow progress toward goals with increased gait distances and mobility.  She performed bed mobility and scooting with CGA and minimal verbal cueing. She then performed ambulation 40 feet x2 with CGA to min A for balance. She stopped to rest after feeling dizzy and take BP [facility] through ambulation. BP was WNL in sitting and standing. Patient left in presence and care of PCA. Continued skilled cardiac PT   PM :  Tanika WILLETT 
ACUTE PHYSICAL THERAPY GOALS:   (Developed with and agreed upon by patient and/or caregiver.)  ALL GOALS ONGOING 7/20/2024 and modified gait distance.    LTG:  (1.)Ms. Medina will move from supine to sit and sit to supine , scoot up and down, and roll side to side in bed with INDEPENDENCE within 7 treatment day(s).    (2.)Ms. Medina will transfer from bed to chair and chair to bed with MODIFIED INDEPENDENCE using the least restrictive device within 7 treatment day(s).    (3.)Ms. Medina will ambulate with MODIFIED INDEPENDENCE for 1000 feet with the least restrictive device within 7 treatment day(s).  (4.)Ms. Medina will participate in therapeutic activity/exercises x 38 minutes for increased activity tolerance with SpO2 above 90% within 7 treatment days.    PHYSICAL THERAPY: Daily Note PM  (Link to Caseload Tracking: PT Visit Days : 5  Time In/Out PT Charge Capture  Rehab Caseload Tracker  Orders    Tanika Medina is a 78 y.o. female   PRIMARY DIAGNOSIS: S/P AVR  Heart murmur [R01.1]  Acute respiratory failure with hypoxia (HCC) [J96.01]  Acute on chronic respiratory failure with hypoxia (HCC) [J96.21]  Aortic valve stenosis, nonrheumatic [I35.0]  Procedure(s) (LRB):  Insert ICD dual (N/A)  3 Days Post-Op  Inpatient: Payor: [employer] MEDICARE / [place] / Product Type: *No Product type* /     ASSESSMENT:     REHAB RECOMMENDATIONS:   Recommendation to date pending progress:  Setting:  Home Health Therapy     Equipment:    To Be Determined     ASSESSMENT:  Ms. Medina is making slow progress toward goals with increased gait distances and mobility.  She performed bed mobility and scooting with CGA and minimal verbal cueing. She then performed ambulation 40 feet x2 with CGA to min A for balance. She stopped to rest after feeling dizzy and take BP group home through ambulation. BP was WNL in sitting and standing. Patient left in presence and care of PCA. Continued skilled cardiac PT.       SUBJECTIVE: 
ACUTE PHYSICAL THERAPY GOALS:   (Developed with and agreed upon by patient and/or caregiver.)  ALL GOALS ONGOING 7/21/2024 and modified gait distance.    LTG:  (1.)Ms. Medina will move from supine to sit and sit to supine , scoot up and down, and roll side to side in bed with INDEPENDENCE within 7 treatment day(s).    (2.)Ms. Medina will transfer from bed to chair and chair to bed with MODIFIED INDEPENDENCE using the least restrictive device within 7 treatment day(s).    (3.)Ms. Medina will ambulate with MODIFIED INDEPENDENCE for 1000 feet with the least restrictive device within 7 treatment day(s).  (4.)Ms. Medina will participate in therapeutic activity/exercises x 38 minutes for increased activity tolerance with SpO2 above 90% within 7 treatment days.    PHYSICAL THERAPY: Daily Note AM  (Link to Caseload Tracking: PT Visit Days : 6  Time In/Out PT Charge Capture  Rehab Caseload Tracker  Orders    Tanika Medina is a 78 y.o. female   PRIMARY DIAGNOSIS: S/P AVR  Heart murmur [R01.1]  Acute respiratory failure with hypoxia (HCC) [J96.01]  Acute on chronic respiratory failure with hypoxia (HCC) [J96.21]  Aortic valve stenosis, nonrheumatic [I35.0]  Procedure(s) (LRB):  Insert ICD dual (N/A)  4 Days Post-Op  Inpatient: Payor: [facility] MEDICARE / [place] / Product Type: *No Product type* /     ASSESSMENT:     REHAB RECOMMENDATIONS:   Recommendation to date pending progress:  Setting:  Home Health Therapy     Equipment:    To Be Determined     ASSESSMENT:  Ms. Medina was supine in bed on arrival and agreeable to therapy. Supine to sit on edge of bed with SBA. Toilet transfer with SBA. She ambulated 225' with RW and CGA. Pt left sitting up in chair with needs in reach. Good progress today.      SUBJECTIVE:   Ms. Medina states, \"I'm ready\"     Social/Functional Lives With: Spouse  Type of Home: House  Home Equipment: None  ADL Assistance: Independent  Homemaking Assistance: Independent  Homemaking 
ACUTE PHYSICAL THERAPY GOALS:   (Developed with and agreed upon by patient and/or caregiver.)  LTG:  (1.)Ms. Medina will move from supine to sit and sit to supine , scoot up and down, and roll side to side in bed with INDEPENDENCE within 7 treatment day(s).    (2.)Ms. Medina will transfer from bed to chair and chair to bed with MODIFIED INDEPENDENCE using the least restrictive device within 7 treatment day(s).    (3.)Ms. Medina will ambulate with MODIFIED INDEPENDENCE for 500 feet with the least restrictive device within 7 treatment day(s).  (4.)Ms. Medina will participate in therapeutic activity/exercises x 25 minutes for increased activity tolerance with SpO2 above 90% within 7 treatment days.    PHYSICAL THERAPY: Daily Note AM   (Link to Caseload Tracking: PT Visit Days : 2  Time In/Out PT Charge Capture  Rehab Caseload Tracker  Orders    Tanika Medina is a 78 y.o. female   PRIMARY DIAGNOSIS: Acute on chronic respiratory failure with hypoxia (HCC)  Heart murmur [R01.1]  Acute respiratory failure with hypoxia (HCC) [J96.01]  Acute on chronic respiratory failure with hypoxia (HCC) [J96.21]  Procedure(s) (LRB):  Left and right heart cath / coronary angiography (N/A)  3 Days Post-Op  Inpatient: Payor: [facility] [employer]IC[employer] / [place] / Product Type: *No Product type* /     ASSESSMENT:     REHAB RECOMMENDATIONS:   Recommendation to date pending progress:  Setting:  Home Health Therapy    Equipment:    To Be Determined     ASSESSMENT:  Ms. Medina was supine in bed with sitter present upon arrival.  She came to sitting on EOB with SBA and good sitting balance.  Pt was able to stand today with CGA and RW demonstrating fair standing balance.  She ambulated in falcon with CGA-SBA/RW and cuing for safety.  Pt returned to room and was given supervision for standing activities.  She requested to return to bed and got back in bed with supervision.  Pt progressed in ambulation today.  Co-treatment performed 
ACUTE PHYSICAL THERAPY GOALS:   (Developed with and agreed upon by patient and/or caregiver.)  LTG:  (1.)Ms. Medina will move from supine to sit and sit to supine , scoot up and down, and roll side to side in bed with INDEPENDENCE within 7 treatment day(s).    (2.)Ms. Medina will transfer from bed to chair and chair to bed with MODIFIED INDEPENDENCE using the least restrictive device within 7 treatment day(s).    (3.)Ms. Medina will ambulate with MODIFIED INDEPENDENCE for 500 feet with the least restrictive device within 7 treatment day(s).  (4.)Ms. Medina will participate in therapeutic activity/exercises x 25 minutes for increased activity tolerance with SpO2 above 90% within 7 treatment days.    PHYSICAL THERAPY: Daily Note AM   (Link to Caseload Tracking: PT Visit Days : 4  Time In/Out PT Charge Capture  Rehab Caseload Tracker  Orders    Tanika Medina is a 78 y.o. female   PRIMARY DIAGNOSIS: Acute on chronic respiratory failure with hypoxia (HCC)  Heart murmur [R01.1]  Acute respiratory failure with hypoxia (HCC) [J96.01]  Acute on chronic respiratory failure with hypoxia (HCC) [J96.21]  Procedure(s) (LRB):  Left and right heart cath / coronary angiography (N/A)  10 Days Post-Op  Inpatient: Payor: [employer] / Plan: [place] / Product Type: *No Product type* /     ASSESSMENT:     REHAB RECOMMENDATIONS:   Recommendation to date pending progress:  Setting:  Home Health Therapy    Equipment:    To Be Determined     ASSESSMENT:  Ms. Medina was supine in bed on RA upon entering the room and agreeable to therapy. Today, pt demonstrates relatively consistent performance with previous session(s). This session, pt performed all activity at Mod (I)/SB(A) level(s) including ambulation of ~50 in-room. While moving on their feet, pt cont to ambulate c decreased pa and minimally exaggerated sway although she ultimately did well to avoid any LOB or miss-steps. Pt performed all activity on RA and 
ACUTE PHYSICAL THERAPY GOALS:   (Developed with and agreed upon by patient and/or caregiver.)  LTG:  (1.)Ms. Medina will move from supine to sit and sit to supine , scoot up and down, and roll side to side in bed with INDEPENDENCE within 7 treatment day(s).    (2.)Ms. Medina will transfer from bed to chair and chair to bed with MODIFIED INDEPENDENCE using the least restrictive device within 7 treatment day(s).    (3.)Ms. Medina will ambulate with MODIFIED INDEPENDENCE for 500 feet with the least restrictive device within 7 treatment day(s).  (4.)Ms. Medina will participate in therapeutic activity/exercises x 25 minutes for increased activity tolerance with SpO2 above 90% within 7 treatment days.    ________________________________________________________________________________________________        PHYSICAL THERAPY Initial Assessment and PM  (Link to Caseload Tracking: PT Visit Days : 1  Acknowledge Orders  Time In/Out  PT Charge Capture  Rehab Caseload Tracker    Tanika Medina is a 78 y.o. female   PRIMARY DIAGNOSIS: Acute on chronic respiratory failure with hypoxia (HCC)  Heart murmur [R01.1]  Acute respiratory failure with hypoxia (HCC) [J96.01]  Acute on chronic respiratory failure with hypoxia (HCC) [J96.21]  Procedure(s) (LRB):  Left and right heart cath / coronary angiography (N/A)  1 Day Post-Op  Reason for Referral: Generalized Muscle Weakness (M62.81)  Difficulty in walking, Not elsewhere classified (R26.2)  Inpatient: Payor: [employer] / Plan: [place] / Product Type: *No Product type* /     ASSESSMENT:     REHAB RECOMMENDATIONS:   Recommendation to date pending progress:  Setting:  Home Health Therapy    Equipment:    To Be Determined     ASSESSMENT:  Ms. Medina was admitted to the hospital with c/o increased SOB and intubate en route to the hospital.  She presents to PT with WFL AROM and decreased strength in B LEs.  Pt self-extubated this AM and presents today alert but 
Attempted to see patient this PM for occupational therapy treatment  session. Patient declined. Will follow and re-attempt as schedule permits/patient available. Thank you,    Shelby Recinos, OT    Rehab Caseload Tracker       
Attempted to see patient this PM for occupational therapy treatment  session. Patient off floor for procedure. Will follow and re-attempt as schedule permits/patient available. Thank you,    Shelby Recinos, OT    Rehab Caseload Tracker       
CTS- I had a long talk with Mrs. Medina, her  and daughter, AVR/MVR were discussed at length, risks/benefits were discussed, all questions answered, she wishes to proceed, plan Thursday am  
CTS- await euvolemic JAY, Palliative Care reports significant dementia  
CTS- no complaint, all questions aswered, AVR/MVR in am  
CTS-pt resting, denies any current complaints. AVR/MVR planned for Thursday.     Sapphire Hanson PA-C  
Cardiac Rehab: Spoke with patient regarding referral to cardiac rehab. Patient meets admission criteria based on MVR, AVR (7/11/24).  Written information about Cardiac Rehab given and reviewed with patient. Discussed lifestyle modifications to promote cardiac wellness. Patient indicated that she may want to participate in the cardiac rehab program after some time of healing and settled in her home. I will follow up with her when appropriate regarding participation in the Cardiac Rehab program. Her Cardiologist is Dr. Zuniga.      Thank you,  Leatha Mcdonald, RN, BSN  Cardiopulmonary Rehabilitation Nurse Liaison  [facility]    
Chest tube sutures removed x2. Sites are clean, dry, intact.  
Comprehensive Nutrition Assessment    Type and Reason for Visit: Initial, Positive Nutrition Screen  Malnutrition Screening Tool: Malnutrition Screen  Have you recently lost weight without trying?: Unsure of amount of weight loss (2 points)  Have you been eating poorly because of a decreased appetite?: Yes (1 point)  Malnutrition Screening Tool Score: 3    Nutrition Recommendations/Plan:   Meals and Snacks:  Diet: Continue current order  Nutrition Supplement Therapy:  Medical food supplement therapy:  None  Pt declines     Malnutrition Assessment:  Malnutrition Status: At risk for malnutrition (Comment) (report of wt loss pta)    no aron wasting  Nutrition Assessment:  Nutrition History:  reports pts intake was at baseline pta. He stated they usually sleep in and have a small meal. He reports their large meal is at supper time. Pt denies any ONS use.      Do You Have Any Cultural, Presybeterian, or Ethnic Food Preferences?: No   Weight History: Pt denies any recent wt changes.  stated he thinks pt has lost wt. Per EMR wt hx review of IM office visits 7/10 160lb, 10/20 164lb, 1/31 166lb, 3/18 165lb, 5/28 159lb.   Nutrition Background:       PMH significant for CAD, GERD, and hypothyroidism. Pt admitted with acute on chronic respiratory failure.  Nutrition Interval:  Intubated in the field 6/29. Extubated this am. Diet today advanced per SLP.   Pt seen reclined in bed with  and daughter present. provides majority of history as pt only nods her head when asked questions. They report she has not yet had a meals. Discussed with Patricia COLON who reports okay to give pt lunch tray. RD provided pt with lunch tray. She declines all ONS.     Current Nutrition Therapies:  ADULT DIET; Easy to Chew    Current Intake:   Average Meal Intake: Unable to assess (diet just advanced) Average Supplements Intake: None Ordered      Anthropometric Measures:  Height: 162.6 cm (5' 4\")  Current Body Wt: 75.9 kg (167 lb 
Comprehensive Nutrition Assessment    Type and Reason for Visit: Reassess  Malnutrition Screening Tool: Malnutrition Screen  Have you recently lost weight without trying?: Unsure of amount of weight loss (2 points)  Have you been eating poorly because of a decreased appetite?: Yes (1 point)  Malnutrition Screening Tool Score: 3    Nutrition Recommendations/Plan:   Meals and Snacks:  Diet: Continue current order  Nutrition Supplement Therapy:  Medical food supplement therapy:  Initiate Magic Cup twice per day (this provides 290 kcal and 9 grams protein per serving)     Malnutrition Assessment:  Malnutrition Status: At risk for malnutrition (Comment) (Wt loss PTA, prolonged admission with declning appetite)    no aron wasting  Nutrition Assessment:  Nutrition History:  reports pts intake was at baseline pta. He stated they usually sleep in and have a small meal. He reports their large meal is at supper time. Pt denies any ONS use.      Do You Have Any Cultural, Religion, or Ethnic Food Preferences?: No   Weight History: Pt denies any recent wt changes.  stated he thinks pt has lost wt. Per EMR wt hx review of IM office visits 7/10 160lb, 10/20 164lb, 1/31 166lb, 3/18 165lb, 5/28 159lb.   Nutrition Background:       PMH significant for CAD, GERD, and hypothyroidism. Pt admitted with acute on chronic respiratory failure due to volume overload.  Intubated in the field 6/29. Extubated this 7/1.   7/5: JAY with severe regurgitation  7/11: intubated and underwent aortic valve replacement, mitral vavle replacement, transesophageal echocardiogram.  7/17: cardio defibrillator placement  Nutrition Interval:  Patient up to recliner. Admits to declining appetite. We again discussed nutrition supplements and she states she has tried the shakes in the past and does not care for them. Discussed Magic Cup and she agreed to try it.       Current Nutrition Therapies:  ADULT DIET; Regular; Low Fat/Low Chol/High Fiber/2 
Comprehensive Nutrition Assessment    Type and Reason for Visit: Reassess  Malnutrition Screening Tool: Malnutrition Screen  Have you recently lost weight without trying?: Unsure of amount of weight loss (2 points)  Have you been eating poorly because of a decreased appetite?: Yes (1 point)  Malnutrition Screening Tool Score: 3    Nutrition Recommendations/Plan:   Meals and Snacks:  Diet: Resume Continue NPO and advance as medically appropriate       Malnutrition Assessment:  Malnutrition Status: At risk for malnutrition (Comment) (report of wt loss pta)    no aron wasting  Nutrition Assessment:  Nutrition History:  reports pts intake was at baseline pta. He stated they usually sleep in and have a small meal. He reports their large meal is at supper time. Pt denies any ONS use.      Do You Have Any Cultural, Restoration, or Ethnic Food Preferences?: No   Weight History: Pt denies any recent wt changes.  stated he thinks pt has lost wt. Per EMR wt hx review of IM office visits 7/10 160lb, 10/20 164lb, 1/31 166lb, 3/18 165lb, 5/28 159lb.   Nutrition Background:       PMH significant for CAD, GERD, and hypothyroidism. Pt admitted with acute on chronic respiratory failure.  Nutrition Interval:  Intubated in the field 6/29. Extubated this 7/1. Diet today advanced per SLP.   7/5: JAY  7/11: intubated and underwent aortic valve replacement, mitral vavle replacement, transesophageal echocardiogram.    Patient currently intubated but weaning. Discussed with RN.  Per RN documentation patient intake over last few days was 1x 1-25%; 1x 25-50%; 2x 50-75% and 2x %. No family present at this time.        Current Nutrition Therapies:  Diet NPO Exceptions are: Sips of Water with Meds  ADULT DIET; Clear Liquid    Current Intake:   Average Meal Intake: NPO Average Supplements Intake: None Ordered      Anthropometric Measures:  Height: 162.6 cm (5' 4\")  Current Body Wt: 67 kg (147 lb 11.3 oz) (7/11), Weight source: 
Comprehensive Nutrition Assessment    Type and Reason for Visit: Reassess  Malnutrition Screening Tool: Malnutrition Screen  Have you recently lost weight without trying?: Unsure of amount of weight loss (2 points)  Have you been eating poorly because of a decreased appetite?: Yes (1 point)  Malnutrition Screening Tool Score: 3    Nutrition Recommendations/Plan:   Meals and Snacks:  Diet: Resume Continue current order per Cards  Nutrition Supplement Therapy:  Medical food supplement therapy:  None  Pt declines     Malnutrition Assessment:  Malnutrition Status: At risk for malnutrition (Comment) (report of wt loss pta)    no aron wasting  Nutrition Assessment:  Nutrition History:  reports pts intake was at baseline pta. He stated they usually sleep in and have a small meal. He reports their large meal is at supper time. Pt denies any ONS use.      Do You Have Any Cultural, Sikhism, or Ethnic Food Preferences?: No   Weight History: Pt denies any recent wt changes.  stated he thinks pt has lost wt. Per EMR wt hx review of IM office visits 7/10 160lb, 10/20 164lb, 1/31 166lb, 3/18 165lb, 5/28 159lb.   Nutrition Background:       PMH significant for CAD, GERD, and hypothyroidism. Pt admitted with acute on chronic respiratory failure.  Nutrition Interval:  Intubated in the field 6/29. Extubated this am. Diet today advanced per SLP.   Pt went for JAY this morning. Per cardiology note could resume diet at 1:39 pm. RD discussed with RN and resumed previous diet order. Discussed with family and patient. Patient reports today first day she is really hungry. She has been eating okay otherwise. Still refuses ONS.     Current Nutrition Therapies:  ADULT DIET; Easy to Chew    Current Intake:   Average Meal Intake: 51-75% Average Supplements Intake: None Ordered      Anthropometric Measures:  Height: 162.6 cm (5' 4\")  Current Body Wt: 68.9 kg (151 lb 14.4 oz) (7/5), Weight source: Bed Scale  BMI: 26.1, Overweight 
Discharge instructions, follow up appointments and prescriptions reviewed with patient and family. Both verbalize understanding. All personal belongings taken with patient. Family member will drive patient home. Patient escorted to discharge area via wheelchair. Patient is stable at discharge.       
Dr. Abreu updated on pts hemodynamics. See orders.  
GOALS:  LTG: Patient will maintain adequate hydration/nutrition with optimum safety and efficiency of swallowing function with PO intake without overt signs or symptoms of aspiration for the highest appropriate diet level.  STG:  Patient will consume easy to chew textures and thin liquids without overt signs or symptoms of airway compromise.    SPEECH LANGUAGE PATHOLOGY: DYSPHAGIA Initial Assessment    Acknowledge Order  I  Therapy Time  I   Charges     I  Rehab Caseload Tracker      NAME: Tanika Medina  : 1945  MRN: [id number]    ADMISSION DATE: 2024  PRIMARY DIAGNOSIS: Acute on chronic respiratory failure with hypoxia (HCC)    ICD-10: Treatment Diagnosis: R13.11 Dysphagia, Oral Phase    RECOMMENDATIONS   Diet:    Easy to Chew  Thin Liquids    Medication: as tolerated   Compensatory Swallowing Strategies:   Remain upright for 30-45 minutes after meals  Small bites/sips   Therapeutic Intervention:   Patient/family education   Patient continues to require skilled intervention:  Yes. Recommend ongoing speech therapy services during this hospitalization.     Anticipated Discharge Needs: Do not anticipate ongoing speech therapy needs upon discharge.      ASSESSMENT    Patient presents with mild oral dysphagia due to missing dentition that was left at home. Able to adequately breakdown solids with mildly extended oral prep time. No overt s/sx of airway compromise observed. Voice clear after all intake.     Discussed diet options with patient and family. Family request easy to chew diet given missing dentition. Ok for thin liquids. Medications as tolerated.     Patients voice is mildly hoarse following self-extubation this morning. Anticipate this will resolve. Plan to follow up x1 to check on vocal quality and diet tolerance.     GENERAL    Subjective: Patient alert. Mildly confused, but able to participate in conversation. Family at bedside.    Recent Information/Background: Patient admitted with respiratory 
GOALS:  LTG: Patient will maintain adequate hydration/nutrition with optimum safety and efficiency of swallowing function with PO intake without overt signs or symptoms of aspiration for the highest appropriate diet level. MET 2024    STG:  Patient will consume easy to chew textures and thin liquids without overt signs or symptoms of airway compromise. MET 2024      SPEECH LANGUAGE PATHOLOGY: DYSPHAGIA Daily Note and DISCHARGE    Acknowledge Order  I  Therapy Time  I   Charges     I  Rehab Caseload Tracker      NAME: Tanika Medina  : 1945  MRN: [id number]    ADMISSION DATE: 2024  PRIMARY DIAGNOSIS: Acute on chronic respiratory failure with hypoxia (HCC)    ICD-10: Treatment Diagnosis: R13.11 Dysphagia, Oral Phase    RECOMMENDATIONS   Diet:    Easy to Chew  Thin Liquids  OK for diet advancement to regular textures as requested by patient/family    Medication: as tolerated   Compensatory Swallowing Strategies:   Remain upright for 30-45 minutes after meals  Small bites/sips   Therapeutic Intervention:   Patient/family education   Patient continues to require skilled intervention:  Yes. Recommend ongoing speech therapy services during this hospitalization.     Anticipated Discharge Needs: Do not anticipate ongoing speech therapy needs upon discharge.      ASSESSMENT    Patient tolerating easy to chew diet and thin liquids without observed difficulty. Essentially unremarkable swallow function. No s/sx of airway compromise observed.     Recommend regular diet and thin liquids. Medications as tolerated. Will continue easy to chew diet and thin liquids for now per family request as dentures are not currently present. OK for diet advancement to regular per their request.  No additional speech therapy indicated at this time. Please consider re-consult if new concerns arise.     GENERAL    Subjective: Patient alert and conversant. Reports voice is back at baseline.  and son in law arrived at end of 
JAY complete with   Sedation start 1119  Sedation end 1139  4mg of Versed  50mcg of Fentanyl used for sedation  Numbed with viscous lidocaine at 1033  
Occupational Therapy Note:  Therapist is discharging patient from OT at this time due to decline in medical status and transfer to ICU. Please reconsult OT when MD deems patient appropriate for continued services.   Thank you.  Jd Cuello, OTR/L    
PM note:  Patient is currently off the floor at a procedure.  Will return as time/schedule permit.  Modesta Sánchez-Gabriela, PTA  
POD 4 Days Post-Op    Procedure:  Procedure(s):  AORTIC VALVE REPLACEMENT; MITRAL VALVE REPLACEMENT  TRANSESOPHAGEAL ECHOCARDIOGRAM      Subjective:     Patient has No significant medical complaints      Objective:     Patient Vitals for the past 8 hrs:   BP Temp Temp src Pulse Resp SpO2   07/15/24 0838 135/74 -- -- 100 27 97 %   07/15/24 0830 -- -- -- 100 22 96 %   07/15/24 0815 -- -- -- 100 18 97 %   07/15/24 0800 -- -- -- 100 16 96 %   07/15/24 0745 -- -- -- 100 16 96 %   07/15/24 0730 -- 98.4 °F (36.9 °C) Oral 100 17 96 %   07/15/24 0715 -- -- -- 100 15 96 %   07/15/24 0700 -- -- -- 100 17 96 %   07/15/24 0630 -- -- -- 100 22 97 %   07/15/24 0600 -- -- -- (!) 103 30 --   07/15/24 0530 (!) 150/84 -- -- 100 (!) 40 99 %   07/15/24 0500 134/74 -- -- 100 22 98 %   07/15/24 0445 -- -- -- 100 (!) 37 98 %   07/15/24 0430 (!) 143/87 -- -- 100 16 98 %   07/15/24 0415 -- -- -- 100 25 98 %   07/15/24 0400 133/74 -- -- 100 25 98 %   07/15/24 0345 -- -- -- 100 14 97 %   07/15/24 0330 -- -- -- 100 (!) 39 97 %   07/15/24 0315 -- -- -- 100 21 97 %   07/15/24 0300 (!) 144/81 -- -- 100 25 97 %   07/15/24 0245 -- -- -- 100 18 96 %   07/15/24 0230 100/66 -- -- 100 18 95 %   07/15/24 0215 -- -- -- 100 18 95 %   07/15/24 0200 (!) 85/51 -- -- 100 18 95 %   07/15/24 0145 -- -- -- 100 18 95 %   07/15/24 0130 121/61 -- -- 100 21 95 %   07/15/24 0115 -- -- -- 100 22 96 %   07/15/24 0100 (!) 146/73 -- -- 100 22 93 %     Temp (24hrs), Av.4 °F (36.9 °C), Min:98.2 °F (36.8 °C), Max:98.6 °F (37 °C)        Hemodynamics    PAP    CO    CI    07/15 07 - 07/15 1900  In: 2690.9 [I.V.:2544.2]  Out: 75 [Urine:75]  1901 - 07/15 0700  In: 1485.7 [P.O.:975; I.V.:335.5]  Out: 5225 [Urine:5225]    CT Drainage              total of all CT's    Heart:  regular rate and rhythm, S1, S2 normal, no murmur, click, rub or gallop and irregularly irregular rhythm  Lung:  clear to auscultation bilaterally  Neuro: Grossly non focal  Incisions: Clean, dry, 
Patient has been confused and agitated. Patient is now finally resting and asleep. Per family request, daughter, to defer midnight vitals signs.   
Patient out from operating room and placed on the ventilator on documented settings. Patient is orally intubated with a # 7.0 ETT secured at the 23 cm caitlin at the lip. Breath sounds are diminished. Trachea is midline. Chest excursion is symmetric.   Patient is also Negative for cyanosis.  Patient has a Left Radial arterial line. Patient has 2 Chest tubes.  All alarms are set and audible. Resuscitation bag is  at the head of the bed.      Ventilator Settings  Mode FIO2 Rate Tidal Volume Pressure PEEP I:E Ratio    SIMV  60 20 450 8 cm H2O  8 1:2.3     Peak airway pressure:   21  Minute ventilation:   9.46        Lance Hess RCP   
Patient refusing to move to recliner before breakfast.   
Patient refusing to sit in recliner before breakfast is brought around. States she will move to the recliner \"in a little while\".   
Patient to have valve replacement surgery today. Will need re-eval at next OT session.   Jd Cuello, OT    
Patient's family request that midnight vital signs be deferred again tonight due to increase confusion and agitation when awake at night.  
Physical Therapy Note:    Attempted to see patient this AM and PM for physical therapy evaluation session. RN requesting PT hold at time of both attempt(s). Will follow and re-attempt as schedule permits/patient available. Thank you,    Emeka Urias, PT     Rehab Caseload Tracker    
Physical Therapy Note:    Attempted to see patient this AM for physical therapy treatment  session. Hold this AM per RN.    PM Update: RN requesting to continue to hold therapy due to pt's medical status.  Will follow and re-attempt as schedule permits/patient available. Thank you,    MIKE ESPINOZA, PT     Rehab Caseload Tracker    
Physical Therapy Note:    Attempted to see patient this AM for physical therapy treatment  session. Patient currently off the floor for JAY. Will follow and re-attempt as schedule permits/patient available. Thank you,    Maryjane Gandhi, PT     Rehab Caseload Tracker    
Physical Therapy Note:    Attempted to see patient this AM for physical therapy treatment  session. Patient refused this AM due to fatigue. Will follow and re-attempt as schedule permits/patient available. Thank you,    Maryjane Gandhi, PT     Rehab Caseload Tracker    
Physical Therapy Note:    Attempted to see patient this AM for physical therapy treatment  session. Patient refused x 2 attempts to provide therapy this am. Will follow and re-attempt as schedule permits/patient available. Thank you,    Modesta Sánchez-Gabriela, [facility]     Rehab Caseload Tracker    
Physical Therapy Note:    Attempted to see patient this AM for physical therapy treatment  session. Treatment deferred at this time as the patient states that she has not had anything to eat or drink and would like something( was NPO for a procedure that they are no longer going to do) Patient declined to get out of bed to drink her coffee. Will follow and re-attempt as schedule permits/patient available. Thank you,    Modesta Sánchez-Gabriela, [facility]     Rehab Caseload Tracker    
Physical Therapy Note:    Attempted to see patient this PM for physical therapy treatment  session. Patient refused due to fatigue. Will follow and re-attempt as schedule permits/patient available. Thank you,    Maryjane Gandhi, PT     Rehab Caseload Tracker    
Physical Therapy Note:    Attempted to see patient this PM for physical therapy treatment  session. Patient refused therapy this pm stating that she did not feel well,  at bedside. Will follow and re-attempt as schedule permits/patient available. Thank you,    Modesta Sánchez-Gabriela, [facility]     Rehab Caseload Tracker    
Physical Therapy Note:    Attempted to see patient this PM for physical therapy treatment  session. Treatment deferred at this time as the patient is already back in bed. Will follow and re-attempt as schedule permits/patient available. Thank you,    Modesta Belcher, [facility]     Rehab Caseload Tracker    
Progress Note    2024 7:37 AM          POD#   3.  This 78-year-old woman is postop day #3 from aortic valve replacement with a 21 mm tissue valve and a mitral valve replacement with a 25 mm tissue valve patient had significant aortic and mitral regurgitation.  Patient developed 22nd episode of torsades at 2:52 AM this morning was treated with 2 g of magnesium and ventricular pacing at 100 and now appears to have a relatively stable rhythm.  Patient also required a significant amount of blood and blood products postoperatively but appears to be stable.  Has an elevated white count of 19.6 and a low-grade fever of 99 no obvious site of infection    Subjective:  Ms. Medina is lying in bed and doing reasonably well without significant complaints    OBJECTIVE:    PULSE OXIMETRY RANGE: SpO2  Av.9 %  Min: 90 %  Max: 99 %  SUPPLEMENTAL O2: O2 Flow Rate (L/min): 0 L/min   Vital Signs: BP (!) 150/73   Pulse 100   Temp 99 °F (37.2 °C) (Oral)   Resp 21   Ht 1.626 m (5' 4\")   Wt 78.4 kg (172 lb 13.5 oz)   SpO2 98%   BMI 29.67 kg/m²    Temperature Range:   Temp: 99 °F (37.2 °C)   Temp  Av.7 °F (37.1 °C)  Min: 97.6 °F (36.4 °C)  Max: 99.6 °F (37.6 °C)      Exam:   General appearance:   Neck:   Lungs: Lungs there was some fine crackles on the left side diminished breath sounds on the right side  Sternum: Sternal incision is dry and intact  Heart: Blood pressure is 148/75 patient is currently paced because of the torsades at 100 bpm.  Abdomen: Abdomen is soft and nontender  Extremities:   Leg Wounds:                  Rhythm: Paced at 100 bpm    Labs:   ABG:  No results found for: \"PHART\", \"PO2ART\", \"RKJ5KRW\", \"Z6LEOJVI\", \"PQZ2UPG\", \"THGBART\", \"BSI5LJI\", \"BEART\"  CBC:   Recent Labs     24  0305 24  0156 24  0311   WBC 11.4* 17.8* 19.6*   HGB 9.4* 9.2* 9.8*   HCT 29.4* 28.4* 30.6*   MCV 91.0 91.6 92.2   PLT 76* 71* 92*     BMP:   Recent Labs     24  0305 24  0156 24  1353 
Progress Note    2024 8:33 AM          POD#   2.  This is a 78-year-old woman is postop day #2 from an aortic valve replacement with a 21 mm tissue valve and a mitral valve replacement with a 25 mm tissue valve both valves were regurgitant.  Postoperatively she required a significant amount of blood products and also required intermittent pacing.  Overnight she has not been paced is in normal sinus rhythm but bradycardic with heart rate of 61.    Subjective:  Ms. Medina says she is a little better today.    OBJECTIVE:    PULSE OXIMETRY RANGE: SpO2  Av.9 %  Min: 78 %  Max: 100 %  SUPPLEMENTAL O2: O2 Flow Rate (L/min): 2 L/min   Vital Signs: /63   Pulse 54   Temp 98.5 °F (36.9 °C) (Oral)   Resp 20   Ht 1.626 m (5' 4\")   Wt 76.8 kg (169 lb 5 oz)   SpO2 98%   BMI 29.06 kg/m²    Temperature Range:   Temp: 98.5 °F (36.9 °C)   Temp  Av.3 °F (37.4 °C)  Min: 98.5 °F (36.9 °C)  Max: 100 °F (37.8 °C)      Exam:   General appearance: Fragile 78-year-old woman in no acute distress  Neck:   Lungs poor inspiratory effort there is crackles bilaterally perhaps right worse than left.  Sternum: Sternal incision is dry and intact  Heart: Blood pressure has been running 1 14-1 70 systolic diastolic 78 heart rate 61 normal sinus rhythm  Abdomen: Abdomen is soft and nontender  Extremities: Patient is still a little edematous but less so than yesterday  Leg Wounds:   Chest tubes minimal drainage.               Rhythm: Currently off pacemaker bradycardic 61 beats normal sinus rhythm    Labs:   ABG:  No results found for: \"PHART\", \"PO2ART\", \"KML9XMY\", \"G8GWATQJ\", \"PIV4IBF\", \"THGBART\", \"QSB2JRG\", \"BEART\"  CBC:   Recent Labs     24  1923 24  0305 24  0156   WBC 12.5* 11.4* 17.8*   HGB 9.8* 9.4* 9.2*   HCT 30.0* 29.4* 28.4*   MCV 89.6 91.0 91.6   PLT 84* 76* 71*     BMP:   Recent Labs     24  1318 24  1532 24  1923 24  0305 24  0156     --   --  142 137   K 3.5   
Pt in what appears to be ~20 seconds of torsades. Md notified and updated on hemodynamics, labs, and vitals. Cardiology notified and updated. Pacing pads placed on pt and strips placed in chart. 2g mg given per order. Care ongoing.   
Pt refused moving to the recliner this AM before breakfast. States that she is hurting too bad to sit in the chair. Medicated per MAR. Care ongoing.   
Pt was stable most of the night with minimal chest tube output and good urine out. However she developed SVT lasting 5 secs at about 1:10am and an hour later had a some PVCs. Did a lab draw and mag and potassium were replaced this am.  
Respiratory Mechanics completed and are as follows:     Patient extubated self extubated to room air. Patient is  able to communicate and is  negative for stridor. Breath sounds are diminished. No complications with extubation.     Alberto Gregorio RCP  
Respiratory Mechanics completed and are as follows:     Patient extubated to a 40L/40% HHFNC. Patient is able to communicate and is negative for stridor. Breath sounds are clear and diminished. No complications with extubation.     DHAVAL REED, KENNETH  
S/p AVR/MVR today. CT surgery now primary. I will sign off. Re-consult as needed. Thanks.    Laurent Hawkins MD  
Spiritual Care Visit, initial visit.    Visited with patient and spouse at bedside.    Affirmed patient's and spouse's mickey in God.    Prayed for patient's healing and health.    Visit by Thony Solorio, Staff . Ketty, Shelly.FABIANA., B.A.  
Spiritual Care Visit, initial visit.    Visited with patient and spouse at bedside.    Prayed for patient's healing and health.    Visit by Thony Solorio, Staff . Ketty, Shelly.FABIANA., B.A.  
Subjective:   No complaint    Objective:     Patient Vitals for the past 8 hrs:   BP Temp Temp src Pulse Resp SpO2 Weight   24 0703 (!) 141/84 98.2 °F (36.8 °C) Oral 76 16 97 % --   24 0235 (!) 141/79 97.6 °F (36.4 °C) Oral 75 16 94 % 68.2 kg (150 lb 5.7 oz)     Temp (24hrs), Av.7 °F (36.5 °C), Min:97.2 °F (36.2 °C), Max:98.2 °F (36.8 °C)        Heart:  regular rate and rhythm, S1, S2 normal, no murmur, click, rub or gallop  Lung:  clear to auscultation bilaterally   Incisions: Clean, dry, and intact    Labs:  Recent Results (from the past 24 hour(s))   POCT Glucose    Collection Time: 24 11:03 AM   Result Value Ref Range    POC Glucose 150 (H) 65 - 100 mg/dL    Performed by: [employer]    POCT Glucose    Collection Time: 24  2:56 PM   Result Value Ref Range    POC Glucose 129 (H) 65 - 100 mg/dL    Performed by: [employer]    POCT Glucose    Collection Time: 24  7:52 PM   Result Value Ref Range    POC Glucose 116 (H) 65 - 100 mg/dL    Performed by: Real    POCT Glucose    Collection Time: 24  6:31 AM   Result Value Ref Range    POC Glucose 111 (H) 65 - 100 mg/dL    Performed by: Real        Assessment:     Principal Problem:    S/P AVR  Active Problems:    S/P MVR (mitral valve replacement)    Coronary atherosclerosis of native coronary vessel    Acute on chronic respiratory failure with hypoxia (HCC)    Acute pulmonary edema (HCC)    Pleural effusion    Acute respiratory failure with hypoxia (HCC)    Frailty    Encounter for other specified aftercare    Aortic valve stenosis, nonrheumatic    Atelectasis    Hypoxia    Prolonged QT interval    NSVT (nonsustained ventricular tachycardia) (HCC)    Atrial flutter (HCC)    Gait abnormality    Decreased activities of daily living (ADL)  Resolved Problems:    Volume overload    Altered mental status    Shortness of breath      Plan/Recommendations/Medical Decision Making:   Stable, will need 
Subjective:   \"Tired\"    Objective:     Patient Vitals for the past 8 hrs:   BP Temp Temp src Pulse Resp SpO2 Weight   24 0407 -- -- -- -- -- -- 67.9 kg (149 lb 11.1 oz)   24 0251 (!) 144/71 98.1 °F (36.7 °C) Oral 77 18 98 % --     Temp (24hrs), Av.9 °F (36.6 °C), Min:97.3 °F (36.3 °C), Max:98.4 °F (36.9 °C)        Heart:  regular rate and rhythm, S1, S2 normal, no murmur, click, rub or gallop  Lung:  clear to auscultation bilaterally   Incisions: Clean, dry, and intact    Labs:  Recent Results (from the past 24 hour(s))   POCT Glucose    Collection Time: 24 11:03 AM   Result Value Ref Range    POC Glucose 146 (H) 65 - 100 mg/dL    Performed by: Petrona    POCT Glucose    Collection Time: 24  3:55 PM   Result Value Ref Range    POC Glucose 110 (H) 65 - 100 mg/dL    Performed by: BayaCCIRILO    POCT Glucose    Collection Time: 24  7:13 PM   Result Value Ref Range    POC Glucose 126 (H) 65 - 100 mg/dL    Performed by: Real    POCT Glucose    Collection Time: 24  6:00 AM   Result Value Ref Range    POC Glucose 104 (H) 65 - 100 mg/dL    Performed by: Real        Assessment:     Principal Problem:    S/P AVR  Active Problems:    S/P MVR (mitral valve replacement)    Coronary atherosclerosis of native coronary vessel    Acute on chronic respiratory failure with hypoxia (HCC)    Acute pulmonary edema (HCC)    Pleural effusion    Acute respiratory failure with hypoxia (HCC)    Frailty    Encounter for other specified aftercare    Aortic valve stenosis, nonrheumatic    Atelectasis    Hypoxia    Prolonged QT interval    NSVT (nonsustained ventricular tachycardia) (HCC)    Atrial flutter (HCC)    Gait abnormality    Decreased activities of daily living (ADL)  Resolved Problems:    Volume overload    Altered mental status    Shortness of breath      Plan/Recommendations/Medical Decision Making:   Add back her Trazadone, rehab next week    See 
TRANSFER - IN REPORT:    Verbal report received from CVOR (name) on Tanika Medina  being received from CVOR (unit) for routine post-op      Report consisted of patient’s Situation, Background, Assessment and   Recommendations(SBAR).     Information from the following report(s) SBAR, Kardex, OR Summary, Intake/Output, and MAR was reviewed with the receiving nurse.      Per anesthesia on admission patient intubation Normal    Collaborative team agrees of surgeon, anesthesia, RT, and RN agrees to proceed with slow weaning per Dr. Ellis.        Opportunity for questions and clarification was provided.      Assessment completed upon patient’s arrival to unit and care assumed by Tereza COLON.    
TRANSFER - IN REPORT:    Verbal report received from DARLENE Beltran on Tanika Medina  being received from ER for change in patient condition (intubated)      Report consisted of patient's Situation, Background, Assessment and   Recommendations(SBAR).     Information from the following report(s) Nurse Handoff Report, Index, Intake/Output, MAR, Recent Results, and Cardiac Rhythm NSR  was reviewed with the receiving nurse.    Opportunity for questions and clarification was provided.          
TRANSFER - IN REPORT:    Verbal report received from DARLENE Brooke on Tanika Medina  being received from Cath Lab for routine progression of patient care      Report consisted of patient's Situation, Background, Assessment and   Recommendations(SBAR).     Information from the following report(s) Nurse Handoff Report was reviewed with the receiving nurse.    Opportunity for questions and clarification was provided.      Assessment will be completed upon patient's arrival to unit and care assumed.    
TRANSFER - IN REPORT:    Verbal report received from Tiana RN on Tanika Medina  being received from 3112 for routine progression of patient care      Report consisted of patient's Situation, Background, Assessment and   Recommendations(SBAR).     Information from the following report(s) Nurse Handoff Report, Index, Surgery Report, MAR, and Recent Results was reviewed with the receiving nurse.    Opportunity for questions and clarification was provided.      Assessment completed upon patient's arrival to unit and care assumed.     Patient arrived to room 423. Vital signs obtained, telemetry monitor verified, and admission assessment completed. Sitter at bedside. Bed low and locked, side rails x2, and bed alarm on.   
TRANSFER - OUT REPORT:    Verbal report given to CCU RN on Tanika Medina being transferred to [facility] for routine progression of patient care       Report consisted of patient's Situation, Background, Assessment and   Recommendations(SBAR).     Information from the following report(s) Event Log was reviewed with the receiving nurse.    Opportunity for questions and clarification was provided.      Patient transported with:   Monitor  Registered Nurse    
TRANSFER - OUT REPORT:    Verbal report given to DARLENE Martin on Tanika Medina  being transferred to [facility].    Report consisted of patient's Situation, Background, Assessment and   Recommendations(SBAR).     Information from the following report(s) Nurse Handoff Report, MAR, and Recent Results was reviewed with the receiving nurse.    Opportunity for questions and clarification was provided.        
TRANSFER - OUT REPORT:    Verbal report given to DARLENE Spencer on Tanika Medina being transferred to 2225 for routine progression of patient care       Report consisted of patient’s Situation, Background, Assessment and Recommendations (SBAR).     Information from the following report(s) SBAR, Kardex, and Procedure Summary was reviewed with the receiving nurse.    Opportunity for questions and clarification was provided.      BR til 8:45PM  
TRANSFER - OUT REPORT:    Verbal report given to Martha RN(name) on Tanika Medina  being transferred to 4th floor tele(unit) for routine progression of patient care       Report consisted of patient’s Situation, Background, Assessment and   Recommendations(SBAR).     Information from the following report(s) Nurse Handoff Report was reviewed with the receiving nurse.    Opportunity for questions and clarification was provided.      Patient transported with:   Registered Nurse             
TRANSFER - OUT REPORT:    Verbal report given to RN on Tanika Medina  being transferred to [facility] for routine progression of patient care       Report consisted of patient's Situation, Background, Assessment and   Recommendations(SBAR).     Information from the following report(s) Nurse Handoff Report and MAR was reviewed with the receiving nurse.      JAY, LHC and RHC w/ Dr. Escobedo  No interventions  R femoral venous access with 7fr sheath, closed with Vascade  R femoral arterial access with 5fr sheath, closed with Vascade  No s/sxs of bleeding to R femoral venous or artereial access sites    Pt currently has fentanyl and propofol infusing, no additional sedation was used during the procedures.   
TRANSFER - OUT REPORT:    Verbal report given to Stephanie COLON, RN on Tanika Medina  being transferred to [facility] for routine progression of patient care       Report consisted of patient’s Situation, Background, Assessment and Recommendations(SBAR).     Information from the following report(s) SBAR and Procedure Summary was reviewed with the receiving nurse.    Opportunity for questions and clarification was provided.      
Transesophageal Echo Note  - Indication: mitral regurgitation, CHF  - pt underwent successful JAY today in cath holding  - start 1119  - stop 1139  - sedation: 4 mg IV Midazolam, 50 mcg Fentanyl IV given by Young Ramirez RN under my direct supervision. Direct monitoring of vital signs and respiratory status throughout the procedure.   - An independent trained observer pushed medications at my direction. We monitored the patient's level of consciousness and vital signs/physiologic status throughout the procedure duration (see start and stop times above).   - No complications, pt in stable condition  - JAY Brief Findings: LVEF severely reduced, severe appearing mitral regurgitation with systolic flow reversal in the pulmonary veins, moderate AI.  Estimated aortic valve area by planimetry 1.34 cm².  3D images obtained.  - Complete JAY report to follow.   - OK for oral intake at 1339.  - No driving or heavy machine operation for 24 hours.   - Results will be made available to the primary cardiologist Dr. Zuniga as well as the cardiothoracic surgical team.    
Transesophageal echocardiogram demonstrates severely reduced ejection fraction with moderate to severe AI and severe mitral regurgitation the mitral valve leaflets appear reasonably normal there is no evidence of flail leaflet ruptured cord or vegetation as the cause this appears to be secondary MR and is severe and central to slightly eccentrically directed.  The aortic valve is moderately sclerotic and demonstrates moderately severe AI  
Ventilator check complete; patient has a #7.0 ET tube secured at the 23 at the lip.   Breath sounds are diminished.  Trachea is midline, Negative for subcutaneous air, and chest excursion is symmetric. Patient is also Negative for cyanosis and is Negative for pitting edema.  All alarms are set and audible.  Resuscitation bag is  at the head of the bed.      Ventilator Settings  Mode FIO2 Rate Tidal Volume Pressure PEEP I:E Ratio   AC/PRVC  70 %   18  400       8 1:3      Peak airway pressure:     Minute ventilation:       ABG: No results for input(s): \"PH\", \"PCO2\", \"PO2\", \"HCO3\" in the last 72 hours.      Michele Alexandre, PARTHP          
Ventilator check complete; patient has a #7.0 ET tube secured at the 24 at the lip.  Patient is  sedated.  Patient is  able to follow commands.  Breath sounds are diminished.  Trachea is midline, Negative for subcutaneous air, and chest excursion is symmetric. Patient is also Negative for cyanosis and is Positive for pitting edema.  All alarms are set and audible.  Resuscitation bag is  at the head of the bed.      Ventilator Settings  Mode FIO2 Rate Tidal Volume Pressure PEEP I:E Ratio   AC/PRVC  30 %   18 450    8 1:2.9          Addison Zabala RCP  
Ventilator check complete; patient has a #7.0 ET tube secured at the 24 at the lip.  Patient is  sedated.  Patient is not able to follow commands.  Breath sounds are clear.  Trachea is midline, Negative for subcutaneous air, and chest excursion is symmetric. Patient is also Negative for cyanosis and is Negative for pitting edema.  All alarms are set and audible.  Resuscitation bag is  at the head of the bed.      Ventilator Settings  Mode FIO2 Rate Tidal Volume Pressure PEEP I:E Ratio   (P) AC/PRVC  (P) 30 % 18     450    8   (P) 1:2.9      Peak airway pressure:26     Minute ventilation:   8.51    ABG: No results for input(s): \"PH\", \"PCO2\", \"PO2\", \"HCO3\" in the last 72 hours.      PARTH WALLACEP   
 She did continue to rely on HHA during gait, but was much more steady.  The patient continues to fatigue quickly during gait requiring rest breaks.        SUBJECTIVE:   Ms. Medina states, \"I'm ready\"     Social/Functional Lives With: Spouse  Type of Home: House  Home Equipment: None  ADL Assistance: Independent  Homemaking Assistance: Independent  Homemaking Responsibilities: Yes  Ambulation Assistance: Independent  Transfer Assistance: Independent  Active : Yes  OBJECTIVE:     PAIN: VITALS / O2: PRECAUTION / LINES / DRAINS:   Pre Treatment: 0/10         Post Treatment: 0/10 Vitals        Oxygen    Telemetry  and external pacer    RESTRICTIONS/PRECAUTIONS:  Restrictions/Precautions  Restrictions/Precautions: Fall Risk  Restrictions/Precautions: Fall Risk     MOBILITY: I Mod I S SBA CGA Min Mod Max Total  NT x2 Comments:   Bed Mobility    Rolling [] [] [] [] [] [] [] [] [] [x] []    Supine to Sit [] [] [] [] [x] [] [] [] [] [] []    Scooting [] [] [] [] [x] [] [] [] [] [] []    Sit to Supine [] [] [] [] [x] [x] [] [] [] [] []    Transfers    Sit to Stand [] [] [] [] [] [x] [] [] [] [] []    Bed to Chair [] [] [] [] [] [] [] [] [] [] []    Stand to Sit [] [] [] [] [] [x] [] [] [] [] []     [] [] [] [] [] [] [] [] [] [] []    I=Independent, Mod I=Modified Independent, S=Supervision, SBA=Standby Assistance, CGA=Contact Guard Assistance,   Min=Minimal Assistance, Mod=Moderate Assistance, Max=Maximal Assistance, Total=Total Assistance, NT=Not Tested    BALANCE: Good Fair+ Fair Fair- Poor NT Comments   Sitting Static [x] [] [] [] [] []    Sitting Dynamic [x] [] [] [] [] []              Standing Static [x] [] [] [] [] []    Standing Dynamic [] [x] [] [] [] []      GAIT: I Mod I S SBA CGA Min Mod Max Total  NT x2 Comments:   Level of Assistance [] [] [] [] [] [x] [] [] [] [] []     Distance   75 feet x3    DME HHA    Gait Quality Decreased pa  and Trunk sway increased    Weightbearing Status      Stairs    
Ordered      Anthropometric Measures:  Height: 162.6 cm (5' 4\")  Current Body Wt: 78.4 kg (172 lb 13.5 oz) (7/14), Weight source: Bed Scale  BMI: 29.7, Overweight (BMI 25.0-29.9)  Admission Body Weight: 86.2 kg (190 lb) (6/29)  Ideal Body Weight (Kg) (Calculated): 55 kg (120 lbs),    BMI Category Overweight (BMI 25.0-29.9)  Estimated Daily Nutrient Needs:  Energy (kcal/day): 7425-4368 (20-25 kcal/kg) (Kcal/kg Weight used: 75.9 kg Current  Protein (g/day): 76-91 (1-1.2 g/kg) Weight Used: (Current) 75.9 kg  Fluid (ml/day):   (1 ml/kcal)    Nutrition Diagnosis:   Unintended weight loss related to inadequate protein-energy intake as evidenced by  (wt trending down per IM office visits)  Nutrition Interventions:   Food and/or Nutrient Delivery: Continue Current Diet     Coordination of Nutrition Care: Continue to monitor while inpatient      Goals:   Previous Goal Met: Goal(s) Achieved  Active Goal: Meet at least 75% of estimated needs, by next RD assessment       Nutrition Monitoring and Evaluation:      Food/Nutrient Intake Outcomes: Food and Nutrient Intake  Physical Signs/Symptoms Outcomes: Meal Time Behavior, Weight    Discharge Planning:    No discharge needs at this time    KASANDRA NAGY, RD      
Propofol : 0 mcg/kg/min  Dose (mcg/kg/hr) Dexmedetomidine: 0.1 mcg/kg/hr  Dose (mcg/hr) Fentanyl: 0 mcg/hr  Dose (mcg/min) Epinephrine: 0 mcg/min  Dose (mcg/min) Norepinephrine: 0 mcg/min  Dose (mcg/kg/min) Phenylephrine : 0 mcg/kg/min  Dose (mg/hr) Nicardipine: 5 mg/hr  Dose (mcg/kg/min) Dobutamine: 6 mcg/kg/min  Dose (units/hr) Insulin : 0 Units/hr     CI:  CO (l/min): 3.8 l/min    Ventilator Settings    Ideal body weight: 54.7 kg (120 lb 9.5 oz)  Adjusted ideal body weight: 63.5 kg (140 lb 1.3 oz)  Mode FIO2 Rate Tidal Volume Pressure   CPAP/PS    40 %  18 bpm         5     Peak airway pressure:     Minute ventilation:    No results for input(s): \"PH\", \"PCO2\", \"PO2\", \"HCO3\" in the last 72 hours.   Recent Labs     07/11/24 1318 07/11/24 1532 07/11/24 1923 07/12/24 0305 07/13/24  0156   WBC 22.7* 14.8* 12.5* 11.4* 17.8*   HGB 13.0 9.7* 9.8* 9.4* 9.2*   HCT 39.5 29.5* 30.0* 29.4* 28.4*   PLT 59* 83* 84* 76* 71*   INR 1.5  --   --   --   --      Recent Labs     07/11/24  1318 07/11/24 1532 07/11/24 1923 07/12/24 0305 07/13/24  0156     --   --  142 137   K 3.5   < > 3.3* 4.3 3.6   *  --   --  110* 102   CO2 19*  --   --  24 24   BUN 15  --   --  10 9   CREATININE 0.98  --   --  0.69 0.74   MG  --    < > 2.2 2.1 1.9    < > = values in this interval not displayed.     No results for input(s): \"PROCAL\", \"LACTATE\" in the last 72 hours.  06/29/24    JAY (PRN CONTRAST/BUBBLE/3D) 07/05/2024  4:33 PM (Final)    Interpretation Summary    Left Ventricle: Moderately reduced left ventricular systolic function with a visually estimated EF of 40 - 45%. Left ventricle size is normal. Severe septal thickening.IVSd is 2.0 cm. Moderate global hypokinesis present. Indeterminate diastolic function.    Right Ventricle: Right ventricle size is normal. Normal systolic function.    Aortic Valve: Trileaflet valve. Mildly calcified cusps. Moderate regurgitation. VC width 0.5 cm on short axis color Doppler. Moderate stenosis 
chair. Pt left with  present and needs in reach.      SUBJECTIVE:   Ms. Medina states, \"I'm so tired\"     Social/Functional Lives With: Spouse  Type of Home: House  Home Equipment: None  ADL Assistance: Independent  Homemaking Assistance: Independent  Homemaking Responsibilities: Yes  Ambulation Assistance: Independent  Transfer Assistance: Independent  Active : Yes  OBJECTIVE:     PAIN: VITALS / O2: PRECAUTION / LINES / DRAINS:   Pre Treatment: 0/10         Post Treatment: 0/10 Vitals    WNL    Oxygen   RA Telemetry  and external pacer    RESTRICTIONS/PRECAUTIONS:  Restrictions/Precautions  Restrictions/Precautions: Fall Risk  Position Activity Restriction  Sternal Precautions: No Pushing, No Pulling, 5# Lifting Restrictions  Restrictions/Precautions: Fall Risk     MOBILITY: I Mod I S SBA CGA Min Mod Max Total  NT x2 Comments:   Bed Mobility    Rolling [] [] [] [x] [] [] [] [] [] [] []    Supine to Sit [] [] [] [x] [] [] [] [] [] [] []    Scooting [] [] [] [x] [] [] [] [] [] [] []    Sit to Supine [] [] [] [] [] [] [] [] [] [] []    Transfers    Sit to Stand [] [] [] [x] [] [] [] [] [] [] []    Bed to Chair [] [] [] [] [] [] [] [] [] [] []    Stand to Sit [] [] [] [x] [] [] [] [] [] [] []     [] [] [] [] [] [] [] [] [] [] []    I=Independent, Mod I=Modified Independent, S=Supervision, SBA=Standby Assistance, CGA=Contact Guard Assistance,   Min=Minimal Assistance, Mod=Moderate Assistance, Max=Maximal Assistance, Total=Total Assistance, NT=Not Tested    BALANCE: Good Fair+ Fair Fair- Poor NT Comments   Sitting Static [x] [] [] [] [] []    Sitting Dynamic [] [x] [] [] [] []              Standing Static [] [x] [] [] [] []    Standing Dynamic [] [x] [] [] [] []      GAIT: I Mod I S SBA CGA Min Mod Max Total  NT x2 Comments:   Level of Assistance [] [] [] [] [x] [] [] [] [] [] []    Distance   225 feet    DME Rolling Walker    Gait Quality Decreased pa  and Trunk sway increased    Weightbearing Status   
performed all activity on RA and denied any dizziness, lightheadedness or SOB throughout duration of session.  All vitals on monitor WN and expected limits.   At this time, pt remains a good candidate for skilled PT and will continue to benefit from advancing POC. At end of session, pt was positioned to comfort in chair with all needs in reach. RN was made aware of pt performance.        SUBJECTIVE:   Ms. Medina states, \"I am used to being more active than this.\"     Social/Functional Lives With: Spouse  Type of Home: House  Home Equipment: None  ADL Assistance: Independent  Homemaking Assistance: Independent  Homemaking Responsibilities: Yes  Ambulation Assistance: Independent  Transfer Assistance: Independent  Active : Yes  OBJECTIVE:     PAIN: VITALS / O2: PRECAUTION / LINES / DRAINS:   Pre Treatment: 0         Post Treatment: 0 Vitals        Oxygen    Telemetry     RESTRICTIONS/PRECAUTIONS:  Restrictions/Precautions  Restrictions/Precautions: Fall Risk  Restrictions/Precautions: Fall Risk     MOBILITY: I Mod I S SBA CGA Min Mod Max Total  NT x2 Comments:   Bed Mobility    Rolling [] [] [] [] [] [] [] [] [] [] []    Supine to Sit [] [] [] [] [] [] [] [] [] [x] []    Scooting [] [] [] [] [x] [x] [] [] [] [] []    Sit to Supine [] [] [] [] [] [] [] [] [] [x] []    Transfers    Sit to Stand [] [] [] [] [x] [x] [] [] [] [] []    Bed to Chair [] [] [] [] [x] [x] [] [] [] [] []    Stand to Sit [] [] [] [] [x] [x] [] [] [] [] []     [] [] [] [] [] [] [] [] [] [] []    I=Independent, Mod I=Modified Independent, S=Supervision, SBA=Standby Assistance, CGA=Contact Guard Assistance,   Min=Minimal Assistance, Mod=Moderate Assistance, Max=Maximal Assistance, Total=Total Assistance, NT=Not Tested    BALANCE: Good Fair+ Fair Fair- Poor NT Comments   Sitting Static [x] [] [] [] [] []    Sitting Dynamic [x] [] [] [] [] []              Standing Static [] [x] [] [] [] []    Standing Dynamic [] [x] [] [] [] []      GAIT: I Mod 
tolerated well.  Patient is left in the recliner and positioned for comfort with needs within reach.  Good session and progress demonstrated.  Continue PT efforts.  HHPT at d/c,  however due to current living situation the patient may benefit from IRC.       SUBJECTIVE:   Ms. Medina states, \"Good morning\"     Social/Functional Lives With: Spouse  Type of Home: House  Home Equipment: None  ADL Assistance: Independent  Homemaking Assistance: Independent  Homemaking Responsibilities: Yes  Ambulation Assistance: Independent  Transfer Assistance: Independent  Active : Yes  OBJECTIVE:     PAIN: VITALS / O2: PRECAUTION / LINES / DRAINS:   Pre Treatment: 0/10         Post Treatment: 0/10 Vitals        Oxygen    Telemetry  and external pacer    RESTRICTIONS/PRECAUTIONS:  Restrictions/Precautions  Restrictions/Precautions: Fall Risk  Restrictions/Precautions: Fall Risk     MOBILITY: I Mod I S SBA CGA Min Mod Max Total  NT x2 Comments:   Bed Mobility    Rolling [] [] [] [] [] [x] [] [] [] [] []    Supine to Sit [] [] [] [] [] [] [] [] [] [x] []    Scooting [] [] [] [] [] [x] [] [] [] [] []    Sit to Supine [] [] [] [] [] [x] [] [] [] [] []    Transfers    Sit to Stand [] [] [] [] [] [x] [] [] [] [] []    Bed to Chair [] [] [] [] [] [x] [] [] [] [] []    Stand to Sit [] [] [] [] [] [x] [] [] [] [] []     [] [] [] [] [] [] [] [] [] [] []    I=Independent, Mod I=Modified Independent, S=Supervision, SBA=Standby Assistance, CGA=Contact Guard Assistance,   Min=Minimal Assistance, Mod=Moderate Assistance, Max=Maximal Assistance, Total=Total Assistance, NT=Not Tested    BALANCE: Good Fair+ Fair Fair- Poor NT Comments   Sitting Static [x] [] [] [] [] []    Sitting Dynamic [x] [] [] [] [] []              Standing Static [x] [] [] [] [] []    Standing Dynamic [] [x] [] [] [] []      GAIT: I Mod I S SBA CGA Min Mod Max Total  NT x2 Comments:   Level of Assistance [] [] [] [] [x] [x] [] [] [] [] [] CARDIAC WALKER .     Distance   75 
valve stenosis, nonrheumatic  S/p AVR        Atelectasis  IS        Hypoxia  Resolved, stable on room air        Frailty  Continue PT/OT        Encounter for palliative care       Acute on chronic respiratory failure with hypoxia (HCC)  Resolved        Acute pulmonary edema (HCC)       Pleural effusion  Small right pleural effusion        Acute respiratory failure with hypoxia (HCC)  Resolved        Coronary atherosclerosis of native coronary vessel  Previously placed stent was widely patent on [facility]      Dispo  Pt would benefit from acute rehab, IRC evaluating       Sapphire Hanson PA-C  
   DOBUTamine 6 mcg/kg/min (07/12/24 0656)    niCARdipine 5 mg/hr (07/12/24 0700)    sodium chloride Stopped (06/30/24 0841)         Assessment  Postoperative day #1 from aortic valve replacement mitral valve replacement both tissue valves.  2.  Postoperatively patient had moderate amount of chest tube drainage that now appears to be resolved after transfusion of per packed cells and factors  3.  Patient currently paced 79 bpm with underlying bradycardia  4.      Patient Active Problem List   Diagnosis    Arrhythmia    S/P PTCA (percutaneous transluminal coronary angioplasty)    Arthritis    Mitral valve insufficiency    Aortic valve insufficiency    Dyslipidemia    Nonrheumatic aortic valve stenosis    SUMNER (dyspnea on exertion)    Coronary atherosclerosis of native coronary vessel    Abnormal EKG    Chest pain    Acute on chronic respiratory failure with hypoxia (HCC)    Volume overload    Heart murmur    Acute pulmonary edema (HCC)    Pleural effusion    Acute respiratory failure with hypoxia (HCC)    Altered mental status    Frailty    Encounter for palliative care    Aortic valve stenosis    Mitral regurgitation    Shortness of breath    Aortic valve stenosis, nonrheumatic    Atelectasis    Hypoxia       Plan   1.  Plan to wean dobutamine monitor cardiac index patient will require some diuresis.  2.  Continue to monitor rhythm.  3.  Monitor for any further bleeding.  Platelet count is diminished at 76.  4.          MD FRANKLYN ELLIS MD    
Assistance, Max=Maximal Assistance, Total=Total Assistance, NT=Not Tested    PLAN:   FREQUENCY AND DURATION: 3 times/week for duration of hospital stay or until stated goals are met, whichever comes first.    TREATMENT:   TREATMENT:   Therapeutic Activity (23 Minutes): Therapeutic activity included Supine to Sit, Transfer Training, Ambulation on level ground, Sitting balance , and Standing balance to improve functional Activity tolerance, Balance, Coordination, Mobility, and Strength.    TREATMENT GRID:  N/A    AFTER TREATMENT PRECAUTIONS: Bed/Chair Locked, Call light within reach, Chair, Needs within reach, and Visitors at bedside    INTERDISCIPLINARY COLLABORATION:  RN/ PCT and PT/ PTA    EDUCATION:      TIME IN/OUT:  Time In: 1308  Time Out: 1339  Minutes: 31    Maryjane Gandhi, PT    
Assistance, Total=Total Assistance, NT=Not Tested    BALANCE: Good Fair+ Fair Fair- Poor NT Comments   Sitting Static [x] [] [] [] [] []    Sitting Dynamic [x] [] [] [] [] []              Standing Static [x] [] [] [] [] []    Standing Dynamic [] [x] [] [] [] []      GAIT: I Mod I S SBA CGA Min Mod Max Total  NT x2 Comments:   Level of Assistance [] [] [] [] [x] [x] [] [] [] [] [] CARDIAC WALKER .     Distance   60 feet    DME Rolling Walker    Gait Quality Decreased pa  and Trunk sway increased    Weightbearing Status      Stairs      I=Independent, Mod I=Modified Independent, S=Supervision, SBA=Standby Assistance, CGA=Contact Guard Assistance,   Min=Minimal Assistance, Mod=Moderate Assistance, Max=Maximal Assistance, Total=Total Assistance, NT=Not Tested    PLAN:   FREQUENCY AND DURATION: BID for duration of hospital stay or until stated goals are met, whichever comes first.    TREATMENT:     TREATMENT:   Therapeutic Activity (24 Minutes): Therapeutic activity included Rolling, Supine to Sit, Scooting, Ambulation on level ground, Sitting balance , and Standing balance to improve functional Activity tolerance, Balance, Coordination, Mobility, and Strength.    TREATMENT GRID:     Date:   Date:   Date:     ACTIVITY/EXERCISE AM PM AM PM AM PM   LAQ         Shoulder shrugs         Gluteal sets         marching         Ankle pumps         abduction                  B = bilateral; AA = active assistive; A = active; P = passive    AFTER TREATMENT PRECAUTIONS: Bed/Chair Locked, Call light within reach, Chair, Needs within reach, RN at bedside, and Visitors at bedside    INTERDISCIPLINARY COLLABORATION:  RN/ PCT and PT/ PTA    EDUCATION:  review POC and importance of mobility in the recovery process    TIME IN/OUT:  Time In: 1435  Time Out: 1459  Minutes: 24    Modesta Belcher PTA    
[]    Upper Body Dressing [] [] [] [x] [] [] [] [] [] [] Don/doff Gown seated in bed   Lower Body Dressing [] [] [] [] [] [] [] [] [] []    Feeding [] [] [] [] [] [] [] [] [] []    Grooming [] [] [] [x] [] [] [] [] [] [] Oral care, face, and hair standing EOS   Personal Device Care [] [] [] [] [] [] [] [] [] []    Functional Mobility [] [] [] [] [x] [x] [] [] [] [] X 2 x Rw   I=Independent, Mod I=Modified Independent, S=Supervision/Setup, SBA=Standby Assistance, CGA=Contact Guard Assistance, Min=Minimal Assistance, Mod=Moderate Assistance, Max=Maximal Assistance, Total=Total Assistance, NT=Not Tested    BALANCE: Good Fair+ Fair Fair- Poor NT Comments   Sitting Static [] [x] [] [] [] []    Sitting Dynamic [] [x] [] [] [] []              Standing Static [] [] [x] [] [] []    Standing Dynamic [] [] [x] [] [] []        PLAN:     FREQUENCY/DURATION   OT Plan of Care: 3 times/week for duration of hospital stay or until stated goals are met, whichever comes first.    TREATMENT:     TREATMENT:   Neuromuscular Re-education (10 Minutes): Patient participated in neuromuscular re-education including functional reaching, weight shifting, postural training, midline training, standing tolerance activity , and sitting balance activity   with minimal assistance and verbal cues to improve sitting balance, standing balance, posture, static balance, and dynamic balance in order to prepare for functional task, prepare for seated ADLs, prepare for standing ADLs, prepare for functional transfer, increase safety awareness, prepare for discharge home , and prepare for self care..   Self Care (13 minutes): Patient participated in upper body dressing and grooming ADLs in unsupported sitting and standing with minimal verbal cueing to increase independence, decrease assistance required, increase activity tolerance, and increase safety awareness. Patient also participated in bed mobility, functional mobility, and functional transfer training to 
regurgitation. VC width 0.5 cm on short axis color Doppler. Moderate stenosis of the aortic valve.  Estimated aortic valve area by planimetry 1.3    Mitral Valve: Moderately thickened leaflets. Severe appearing mitral regurgitation.  Leaflets with restricted motion.  There is systolic flow reversal in the pulmonary veins on interrogation with pulse-wave Doppler.   Vena contracta width 0.7 cm. No stenosis noted.    Left Atrium: Left atrium size is normal. No left atrial appendage thrombus noted. No left atrial appendage mass noted.    Image quality is adequate.    Signed by: Kevin Schrader DO on 7/5/2024  4:33 PM    Assessment and Plan :  (Medical Decision Making)   Impression: 78 y.o. y/o female s/p CV surgery for S/P AVR; 3 Days Post-Op    Principal Problem:    Acute on chronic respiratory failure with hypoxia (HCC)  resolved      Volume overload  3L net negative yesterday.  Will reduce lasix to daily.      Pleural effusion  Effusions appear to be improving.  Will US later today if there is time.  If not, can reassess tomorrow.      Aortic valve stenosis, nonrheumatic  Plan: s/p AVR/MVR 7/11.       Atelectasis  Plan: IS     Leukocytosis  Monitor.      More than 50% of the time documented was spent in face-to-face contact with the patient and in the care of the patient on the floor/unit where the patient is located.     Lucie Robledo MD    
with restricted motion.  There is systolic flow reversal in the pulmonary veins on interrogation with pulse-wave Doppler.   Vena contracta width 0.7 cm. No stenosis noted.    Left Atrium: Left atrium size is normal. No left atrial appendage thrombus noted. No left atrial appendage mass noted.    Image quality is adequate.    Signed by: Kevin Schrader DO on 7/5/2024  4:33 PM    Assessment and Plan :  (Medical Decision Making)   Impression: 78 y.o. y/o female s/p CV surgery for S/P AVR; 4 Days Post-Op    Principal Problem:    Acute on chronic respiratory failure with hypoxia (HCC)  resolved      Volume overload  Net -1.74 L, on Lasix at reduced dose of 20 mg, quite effective with a total urine output of 2600 cc in the past 24 hours she is net -10.8 L as of now, she is on room air.      Pleural effusion  Effusions appear to be improving.  Given response to Lasix, being on room air, I would give Lasix more time to work another 48 hours perhaps and then reassess for thoracentesis.  Patient is on room air  Aortic valve stenosis, nonrheumatic  Plan: s/p AVR/MVR 7/11.       Atelectasis  Plan: IS     Leukocytosis  Monitor.    Continue current supportive care  Repeat chest x-ray on 17 July.  Continues to be fully paced postop, management per cardiology and thoracic surgery she will likely remain in the ICU setting for the time being.    More than 50% of the time documented was spent in face-to-face contact with the patient and in the care of the patient on the floor/unit where the patient is located.     Jorge Luis Licona MD    
murmur at USB. No r/g.  No jugular venous distension.  Lungs:   CTAB.  No wheezing, rhonchi, or rales.  Symmetric expansion.  Abdomen:   Soft, nontender, nondistended.  Extremities: No cyanosis or clubbing.  No edema  Skin:     No rashes.  Normal coloration.   Warm and dry.    Neuro:  CN II-XII grossly intact. No focal deficits  Psych:  Normal mood and affect.      I have personally reviewed labs and tests:  Recent Labs:  Recent Results (from the past 48 hour(s))   Basic Metabolic Panel w/ Reflex to MG    Collection Time: 07/08/24  4:27 AM   Result Value Ref Range    Sodium 136 136 - 145 mmol/L    Potassium 3.0 (L) 3.5 - 5.1 mmol/L    Chloride 98 98 - 107 mmol/L    CO2 24 20 - 28 mmol/L    Anion Gap 15 9 - 18 mmol/L    Glucose 160 (H) 70 - 99 mg/dL    BUN 19 8 - 23 MG/DL    Creatinine 0.84 0.60 - 1.10 MG/DL    Est, Glom Filt Rate 70 >60 ml/min/1.73m2    Calcium 9.8 8.8 - 10.2 MG/DL   CBC    Collection Time: 07/08/24  4:27 AM   Result Value Ref Range    WBC 9.9 4.3 - 11.1 K/uL    RBC 4.98 4.05 - 5.2 M/uL    Hemoglobin 14.5 11.7 - 15.4 g/dL    Hematocrit 44.5 35.8 - 46.3 %    MCV 89.4 82 - 102 FL    MCH 29.1 26.1 - 32.9 PG    MCHC 32.6 31.4 - 35.0 g/dL    RDW 12.3 11.9 - 14.6 %    Platelets 320 150 - 450 K/uL    MPV 10.1 9.4 - 12.3 FL    nRBC 0.00 0.0 - 0.2 K/uL   Magnesium    Collection Time: 07/08/24  4:27 AM   Result Value Ref Range    Magnesium 2.1 1.8 - 2.4 mg/dL       No results for input(s): \"COVID19\" in the last 72 hours.    Current Meds:  Current Facility-Administered Medications   Medication Dose Route Frequency    furosemide (LASIX) tablet 40 mg  40 mg Oral BID    acetaminophen (TYLENOL) tablet 650 mg  650 mg Oral Q6H PRN    Or    acetaminophen (TYLENOL) suppository 650 mg  650 mg Rectal Q6H PRN    famotidine (PEPCID) tablet 20 mg  20 mg Oral BID    QUEtiapine (SEROQUEL) tablet 25 mg  25 mg Oral Nightly PRN    metoprolol succinate (TOPROL XL) extended release tablet 25 mg  25 mg Oral BID    spironolactone 
[id number]     Product Code Blood Bank RC LR     Unit Divison 00     Dispense Status Blood Bank ALLOCATED     Crossmatch Result Compatible     Unit Number [id number]     Product Code Blood Bank RC LR     Unit Divison 00     Dispense Status Blood Bank ALLOCATED     Crossmatch Result Compatible     Unit Number [id number]     Product Code Blood Bank RC LR     Unit Divison 00     Dispense Status Blood Bank ALLOCATED     Crossmatch Result Compatible    Hemoglobin A1c    Collection Time: 07/10/24  4:42 AM   Result Value Ref Range    Hemoglobin A1C 5.8 (H) 0 - 5.6 %    Estimated Avg Glucose 119 mg/dL   PREPARE RBC (CROSSMATCH), 4 Units    Collection Time: 07/10/24  7:00 AM   Result Value Ref Range    History Check Historical check performed        No results for input(s): \"COVID19\" in the last 72 hours.    Current Meds:  Current Facility-Administered Medications   Medication Dose Route Frequency    [START ON 7/11/2024] ceFAZolin (ANCEF) 2000 mg in sterile water 20 mL IV syringe  2,000 mg IntraVENous On Call to OR    amiodarone (CORDARONE) tablet 600 mg  600 mg Oral Q12H    [START ON 7/11/2024] alcohol 62% (NOZIN) nasal  3 ampule  3 ampule Nasal Once    traZODone (DESYREL) tablet 150 mg  150 mg Oral Nightly    furosemide (LASIX) tablet 40 mg  40 mg Oral BID    acetaminophen (TYLENOL) tablet 650 mg  650 mg Oral Q6H PRN    Or    acetaminophen (TYLENOL) suppository 650 mg  650 mg Rectal Q6H PRN    famotidine (PEPCID) tablet 20 mg  20 mg Oral BID    QUEtiapine (SEROQUEL) tablet 25 mg  25 mg Oral Nightly PRN    metoprolol succinate (TOPROL XL) extended release tablet 25 mg  25 mg Oral BID    [Held by provider] spironolactone (ALDACTONE) tablet 25 mg  25 mg Oral Daily    DULoxetine (CYMBALTA) extended release capsule 60 mg  60 mg Oral Daily    levothyroxine (SYNTHROID) tablet 75 mcg  75 mcg Oral QAM AC    rosuvastatin (CRESTOR) tablet 20 mg  20 mg Oral Nightly    HYDROcodone-acetaminophen (NORCO)  MG per 
5.2 M/uL    Hemoglobin 13.0 11.7 - 15.4 g/dL    Hematocrit 39.5 35.8 - 46.3 %    MCV 88.6 82 - 102 FL    MCH 29.1 26.1 - 32.9 PG    MCHC 32.9 31.4 - 35.0 g/dL    RDW 13.1 11.9 - 14.6 %    Platelets 59 (L) 150 - 450 K/uL    MPV 10.4 9.4 - 12.3 FL    nRBC 0.00 0.0 - 0.2 K/uL   Protime-INR    Collection Time: 07/11/24  1:18 PM   Result Value Ref Range    Protime 18.5 (H) 11.3 - 14.9 sec    INR 1.5     APTT    Collection Time: 07/11/24  1:18 PM   Result Value Ref Range    APTT 47.1 (H) 23.3 - 37.4 SEC   Fibrinogen    Collection Time: 07/11/24  1:18 PM   Result Value Ref Range    Fibrinogen 204 190 - 501 mg/dL   POCT Glucose    Collection Time: 07/11/24  1:19 PM   Result Value Ref Range    POC Glucose 118 (H) 65 - 100 mg/dL    Performed by: Prudencio    Arterial Blood Gas, POC    Collection Time: 07/11/24  1:25 PM   Result Value Ref Range    DEVICE ADULT VENT      FIO2 60 %    POC pH 7.41 7.35 - 7.45      POC pCO2 33.1 (L) 35 - 45 MMHG    POC PO2 289 (H) 75 - 100 MMHG    POC HCO3 20.9 (L) 22 - 26 MMOL/L    POC O2 SAT 99.9 (H) 95 - 98 %    Base Deficit (POC) 3.1 mmol/L    Mode SIMV      POC TIDAL VOLUME 450 ml    POC PEEP 8 cmH2O    MEAN AIRWAY PRESSURE 12 cmH2O    IPAP/PIP/High PEEP 20      POC Pressure Support 8 cmH2O    POC Cruz's Test Negative      Inspiratory Time 0.9 sec    Respiratory Rate 20      Site DRAWN FROM ARTERIAL LINE      Specimen type: ARTERIAL      Performed by: Moe     Respiratory Comment: ve9.2    EKG 12 lead    Collection Time: 07/11/24  1:43 PM   Result Value Ref Range    Ventricular Rate 64 BPM    Atrial Rate 64 BPM    P-R Interval 220 ms    QRS Duration 118 ms    Q-T Interval 516 ms    QTc Calculation (Bazett) 532 ms    P Axis 46 degrees    R Axis 77 degrees    T Axis 109 degrees    Diagnosis       Sinus rhythm with 1st degree A-V block  Incomplete left bundle branch block  ST & T wave abnormality, consider anterolateral ischemia  Prolonged QT  Abnormal ECG  When compared with 
mL IntraVENous 2 times per day    sodium chloride flush 0.9 % injection 5-40 mL  5-40 mL IntraVENous PRN    0.9 % sodium chloride infusion   IntraVENous PRN    potassium chloride 20 mEq/50 mL IVPB (Central Line)  20 mEq IntraVENous PRN    Or    potassium chloride 10 mEq/100 mL IVPB (Peripheral Line)  10 mEq IntraVENous PRN    magnesium sulfate 2000 mg in 50 mL IVPB premix  2,000 mg IntraVENous PRN    ondansetron (ZOFRAN-ODT) disintegrating tablet 4 mg  4 mg Oral Q8H PRN    Or    ondansetron (ZOFRAN) injection 4 mg  4 mg IntraVENous Q6H PRN    polyethylene glycol (GLYCOLAX) packet 17 g  17 g Oral Daily PRN    enoxaparin (LOVENOX) injection 40 mg  40 mg SubCUTAneous Daily       Signed:  Aaron Hawkins MD    Part of this note may have been written by using a voice dictation software.  The note has been proof read but may still contain some grammatical/other typographical errors.

## 2024-07-23 NOTE — CARE COORDINATION
07/23/24 1131   Discharge Planning   Patient expects to be discharged to: Acute rehab  ([facility])   Services At/After Discharge   Transition of Care Consult (CM Consult) Discharge Planning;Acute Rehab   Services At/After Discharge   ([facility])    Resource Information Provided? No   Mode of Transport at Discharge Other (see comment)  (Family to transport by private car.)   Confirm Follow Up Transport Family   Condition of Participation: Discharge Planning   The Plan for Transition of Care is related to the following treatment goals: IP rehab to assist and support return to baseline function.   The Patient and/or Patient Representative was provided with a Choice of Provider? Patient   The Patient and/Or Patient Representative agree with the Discharge Plan? Yes   Freedom of Choice list was provided with basic dialogue that supports the patient's individualized plan of care/goals, treatment preferences, and shares the quality data associated with the providers?  Yes        07/23/24 1131   Discharge Planning   Patient expects to be discharged to: Acute rehab  ([facility])   Services At/After Discharge   Transition of Care Consult (CM Consult) Discharge Planning;Acute Rehab   Services At/After Discharge   ([facility])   [place] Resource Information Provided? No   Mode of Transport at Discharge Other (see comment)  (Family to transport by private car.)   Confirm Follow Up Transport Family   Condition of Participation: Discharge Planning   The Plan for Transition of Care is related to the following treatment goals: IP rehab to assist and support return to baseline function.   The Patient and/or Patient Representative was provided with a Choice of Provider? Patient   The Patient and/Or Patient Representative agree with the Discharge Plan? Yes   Freedom of Choice list was provided with basic dialogue that supports the patient's individualized plan of care/goals, treatment preferences, and shares the quality data

## 2024-07-23 NOTE — CONSULTS
[facility] Hospitalist Consult   Admit Date:  2024 10:42 AM   Name:  Tanika Medina   Age:  78 y.o.  Sex:  female  :  1945   MRN:  [id number]   Room:  [facility]/    Presenting/Chief Complaint: Respiratory Distress    Reason(s) for Admission: Heart murmur [R01.1]  Acute respiratory failure with hypoxia (HCC) [J96.01]  Acute on chronic respiratory failure with hypoxia (HCC) [J96.21]     Hospitalists consulted by Jorge Luis Joyce MD for: Assume care    History of Presenting Illness:   Tanika Medina is a 78 y.o. female with history of anxiety, chronic pain, CAD, GERD, hypothyroidism and sleep apnea admitted to ICU by intensivist on  for acute hypoxic respiratory failure secondary to volume overload, requiring intubation.  On EMS arrival she was tripoding with severe shortness of breath and respiratory distress.  CPAP was trialed with mild improvement but became fatigued and less responsive and she was intubated in the field and brought to the hospital.    Cardiology consulted.  Patient started on diuresis.  Patient status post left and right heart catheterization 2024 with minimal coronary artery disease and severely reduced ejection fraction <35% and severe mitral regurg.  JAY  showed severely reduced ejection fraction with moderate to severe aortic regurg and severe mitral regurgitation.  Cardiology started patient on goal-directed medical therapy including Entresto, Aldactone, Jardiance and Toprol-XL.  Lisinopril discontinued.  CT surgery consulted.    Patient extubated to room air on .  Hospitalist consulted to asume care as primary.    Patient is seen and examined at the bedside.  Reports feeling better and would like to go home.  Denies chest pain, palpitation, nausea, vomiting.  Denies shortness of breath.      Assessment & Plan:     Acute hypoxic respiratory failure secondary to fluid overload:  Severe mitral regurgitation:   Pleural effusions:  Ischemic cardiomyopathy:  Extubated to 
  MD Rambo Vargas MD           CONSULT NOTE    Tanika Medina         6/29/2024 1945    REFERRING PHYSICIAN:  Dr. Weiss      HISTORY OF PRESENT ILLNESS:  The patient is a 78 y.o. female who presented to the ER via EMS with severe respiratory failure. She required intubation prior to arrival. She has had recurrent episodes of sudden onset of dyspnea as well as intermittent orthopnea for 2-3 months. Echocardiogram showed a reduced LV EF, moderate to severe AI and severe MR. She underwent JAY and LHC on 6/30 that confirmed severe MR. Previously placed stents in the LAD/diagonal were patent. She otherwise had minimal CAD. She self extubated yesterday but is currently stable on room air.   She is fairly active at baseline. She does not use any mobility device.   She is a retired nurse.     Cardiac risk factors are as follows:  Hypertension, Sleep apnea, and Tobacco Use    Primary symptom for surgery:  Other  Most recent EF_30_%  Acute   Prior cardiac arrhythmia  none      No history of stroke, TIA, prior cardiac surgery, prior vascular surgery, anesthetic complication, lung disease, DVT or PE, GI bleeding       Past Medical History:   Diagnosis Date    Abnormal EKG     Aortic valve insufficiency     Arrhythmia     Arthritis     legs , hands , feet    CAD (coronary artery disease)     Chest pain     Coronary atherosclerosis of native coronary vessel     Dyslipidemia     S/P PTCA (percutaneous transluminal coronary angioplasty)        Past Surgical History:   Procedure Laterality Date    CARDIAC PROCEDURE N/A 6/30/2024    Left and right heart cath / coronary angiography performed by Fernandez Escobedo MD at [facility] CARDIAC CATH LAB    GYN      hyst,cystocele , rectocele    HEENT      sinus x2    OTHER SURGICAL HISTORY      carpal tunnel  rt hand    [place] UNLISTED PROCEDURE CARDIAC SURGERY      cath stent 08       Family History   Problem Relation Age of Onset    Heart Disease Father  
  Patient: Tanika Medina MRN: [id number]  SSN: [id number]    YOB: 1945  Age: 78 y.o.  Sex: female       Date of Request: 7/2/2024  Date of Consult:  7/2/2024  Reason for Consult:  family support and education, goals of care, and medical decision making  Requesting Physician: Dr. Jordan     Assessment/Plan:     Principal Diagnosis:    Edema  R60.9    Additional Diagnoses:   Frailty  R54  Counseling, Encounter for Medical Advice  Z71.9  Encounter for Palliative Care  Z51.5    Palliative Performance Scale (PPS):       Medical Decision Making:   Reviewed and summarized labs and imaging from admission.  Met with pt, spouse, and son in law at bedside.  Reviewed ongoing care and discussed concerns given heart failure complicated by AV, MV regurgitation.  Pt notes several episodes of sudden onset severe dyspnea prior to hospitalization.  Pt and family with questions about potential cv surgery.  They are not sure pt would recover.  They asked to speak to surgical team, cv surgery consulted and I reached out to NP to relay my conversation. Will follow and continue goals discussions    Will discuss findings with members of the interdisciplinary team.      Thank you for this referral.         Subjective:     History obtained from:  Patient, Family, and Chart    Chief Complaint: fatigue  History of Present Illness:   78 y.o. female with history of anxiety, chronic pain, CAD, GERD, hypothyroidism and sleep apnea admitted to ICU by intensivist on 6/29 for acute hypoxic respiratory failure secondary to volume overload, requiring intubation.  On EMS arrival she was tripoding with severe shortness of breath and respiratory distress.  CPAP was trialed with mild improvement but became fatigued and less responsive and she was intubated in the field and brought to the hospital.     Cardiology consulted.  Patient started on diuresis.  Patient status post left and right heart catheterization 6/30/2024 with minimal coronary 
[facility] Cardiology Initial Cardiac Evaluation                Date of  Admission: 6/29/2024 10:42 AM     PCP: Herbert Jeter MD  Requested by: Dr Garnica  Primary Cardiologist: Dr Robles Attending: Dr Castro    Reason for Evaluation: CAMILA Medina is a 78 y.o. female with hx of heart murmur, AI, CAD, CP, HLD, s/p AVR, PHD, and hypothyroidism.  Patient was admitted on 6/26/2024 after being brought to the emergency department by EMS for respiratory distress in bed while in or out.  CT on exam showed bilateral pleural effusions with admitted to ICU with echocardiogram.  Echocardiogram showed no diagnosis of heart failure reduced ejection fraction with moderate to severe AI with severe MR.  CT PE was completed with no PE noted.   Patient was diuresed with repeat echo and JAY after patient was euvolemic.  Thus far the patient is -16.5 L this hospital admission.  Patient was extubated on 7/1 and was transitioned to room air.    Due to nature and risk of CV surgery palliative care was consulted for overall care send plan and understand risk of patient may not recover from surgery.  Will continue forward with MVR AVR the patient was continue evaluation till she was ready from a respiratory standpoint.  Patient did develop some inpatient delirium was treated by hospital medicine.  Another JAY was completed prior to surgery when patient patient was euvolemic.  Patient underwent a aortic valve replacement and mitral valve replacement on 7/17/2024 by Dr. Ellis.  Patient had continued bradycardia postsurgery and separate patient wires left in place.  Postsurgery patient required dobutamine and some IV diuresis but was successfully weaned from chest tubes and ET tubes.    On 714 the patient was noted to have RMT with PVCs leading to torsades which was symptomatic.  The patient was treated IV magnesium and rhythm recovered.  Patient required overdrive pacing was seen and stable at that time.  All electrolyte 
[facility] Acute Rehabilitation Referral - Chart Review Only    F/U - see initial consult dated 7/18 by Dr. Boston    HPI: Tanika Medina is a 78 y.o. female patient who presented to [facility] on 6/29/2024 secondary to respiratory failure. Multiple complications. Now s/p AVR/MVR 7/11. ICD implant 7/17. Some ongoing medical complications - mild VS instability. Thrombocytopenia. Steadily improving function. Walking 200 ft HHA, although unsteady. ADLs around SBA level. OT has recommended HH, PT HH vs IRF. Lives with spouse.    A/P  //Gait and Self-care deficits secondary to respiratory failure, cardiac disease, debility  //Decreased independence with ADLs  //Postoperative sternal precautions  //Gait abnormality  Denied for inpatient rehab services at this time:  -Patient currently functioning at too high of level to meet admission criteria requiring intensive ongoing therapy.  -May benefit from outpatient cardiac rehabilitation once cleared by providers.  -Thank you for this consult.    Code status: Full Code     Semaj Obrien MD  Physical Medicine & Rehabilitation (PM&R) Attending Physician  July 23, 2024  
surgery for Acute on chronic respiratory failure with hypoxia (HCC)    Encounter for weaning from Ventilator:  Post op ABG and CXR review. Continue with weaning per protocol.     Hypoxemia:   Once weaned from ventilator will work on IS, mobility and weaning O2.     Atelectasis:   IS, mobility after extubated.  Will f/u CXR today.    S/P Cardiovascular surgery:  Monitor chest tube output, removal per surgery.    More than 50% of the time documented was spent in face-to-face contact with the patient and in the care of the patient on the floor/unit where the patient is located.         Thank you for this referral.  We appreciate the opportunity to participate in this patient's care.  Will follow along with you.    Lucie Robledo MD    
Admission: Good/Fair        Assessment and Plan:         //Gait and Self-care deficits secondary to debility  -s/p CABG  -acute CHF exacerbation  -now off of supplemental O2  //Decreased independence with ADLs  //Gait instability  -Denied for inpatient rehab services at this time, complex acute medical conditions have resolved. Patient reports she does not want to participate in three hours of therapy. Prefers to return to [facility] and complete therapy in that setting as an outpatient.  She states no longer SUMNER, off of supplemental O2.   -Patient currently functioning at too high of level to meet admission criteria: CGA for mobility, ambulating 100 feet with CGA.  -Thank you for this consult. PM&R to continue monitoring functional needs and rehab potential during acute hospital stay            Code status: Full Code     Uma Boston D.O. PM&R  Inpatient Rehabilitation Medical Director    July 18, 2024

## 2024-07-25 ENCOUNTER — TELEPHONE (OUTPATIENT)
Age: 79
End: 2024-07-25

## 2024-07-25 NOTE — TELEPHONE ENCOUNTER
Care Transitions Initial Follow Up Call    Call within 2 business days of discharge: Yes     Patient: Tanika Medina Patient : 1945 MRN: [id number]    RARS: Readmission Risk Score: 9.7       Spoke with: Tanika Medina    Discharge department/facility: [facility]  Pt was instructed to watch for signs of infection such as: increasing redness around PPM site, fever, or purulent drainage at the incision site. I reviewed with pt activity restrictions as well as when to return to the ER for immediate evaluation for sob or cp not relieved by NTG. Pt was instructed the importance of medication compliance, and she was given her upcoming appointments. Pt is requesting the doctor order her a stool softener, per pt she has not had a BM in 3 days.    Follow Up  Future Appointments   Date Time Provider Department Center   2024 11:00 AM [facility] DEVICE 39 [facility] AMB   2024 11:45 AM Garry Zuniga MD [facility] AMB   2024  2:00 PM [facility]       QIAN HUTTON RN

## 2024-07-29 ENCOUNTER — TELEPHONE (OUTPATIENT)
Age: 79
End: 2024-07-29

## 2024-07-29 NOTE — TELEPHONE ENCOUNTER
While making TC call with pt, she was asked if there is anything that I could do for her and pt says, \"could you please tell them to give me an enema, I haven't had a BM in 3 days and I'm miserable\".

## 2024-07-31 ENCOUNTER — NURSE ONLY (OUTPATIENT)
Age: 79
End: 2024-07-31

## 2024-07-31 DIAGNOSIS — I50.22 CHRONIC SYSTOLIC (CONGESTIVE) HEART FAILURE (HCC): Primary | ICD-10-CM

## 2024-07-31 DIAGNOSIS — I47.29 NSVT (NONSUSTAINED VENTRICULAR TACHYCARDIA) (HCC): ICD-10-CM

## 2024-08-02 NOTE — TELEPHONE ENCOUNTER
Spoke with Becky at pt's Pcp's office, explained to her that pt is requesting a stool softner and states she had not had a BM in 3 days (Dr. Zuniga is currently in the hospital), Becky verifies their office will take care of it.

## 2024-08-06 NOTE — PROGRESS NOTES
Future    Follow-up visit for aortic valve replacement with bioprosthetic valve  -     Echo (TTE) complete (PRN contrast/bubble/strain/3D); Future       Return in about 6 weeks (around 9/19/2024).       JERMAINE HERRERA MD  8/8/2024  12:23 PM

## 2024-08-08 ENCOUNTER — OFFICE VISIT (OUTPATIENT)
Age: 79
End: 2024-08-08
Payer: MEDICARE

## 2024-08-08 VITALS
SYSTOLIC BLOOD PRESSURE: 106 MMHG | BODY MASS INDEX: 26.98 KG/M2 | HEART RATE: 80 BPM | DIASTOLIC BLOOD PRESSURE: 60 MMHG | WEIGHT: 158 LBS | HEIGHT: 64 IN

## 2024-08-08 DIAGNOSIS — I25.10 ASCVD (ARTERIOSCLEROTIC CARDIOVASCULAR DISEASE): Primary | ICD-10-CM

## 2024-08-08 DIAGNOSIS — Z95.3 FOLLOW-UP VISIT FOR AORTIC VALVE REPLACEMENT WITH BIOPROSTHETIC VALVE: ICD-10-CM

## 2024-08-08 DIAGNOSIS — Z09 FOLLOW-UP VISIT FOR AORTIC VALVE REPLACEMENT WITH BIOPROSTHETIC VALVE: ICD-10-CM

## 2024-08-08 DIAGNOSIS — Z09 FOLLOW-UP VISIT FOR MITRAL VALVE REPLACEMENT WITH BIOPROSTHETIC VALVE: ICD-10-CM

## 2024-08-08 DIAGNOSIS — Z95.3 FOLLOW-UP VISIT FOR MITRAL VALVE REPLACEMENT WITH BIOPROSTHETIC VALVE: ICD-10-CM

## 2024-08-08 PROCEDURE — 1123F ACP DISCUSS/DSCN MKR DOCD: CPT | Performed by: INTERNAL MEDICINE

## 2024-08-08 PROCEDURE — 99214 OFFICE O/P EST MOD 30 MIN: CPT | Performed by: INTERNAL MEDICINE

## 2024-08-21 ENCOUNTER — OFFICE VISIT (OUTPATIENT)
Dept: CARDIOTHORACIC SURGERY | Age: 79
End: 2024-08-21

## 2024-08-21 ENCOUNTER — TELEPHONE (OUTPATIENT)
Age: 79
End: 2024-08-21

## 2024-08-21 VITALS
HEIGHT: 64 IN | SYSTOLIC BLOOD PRESSURE: 99 MMHG | OXYGEN SATURATION: 94 % | HEART RATE: 69 BPM | WEIGHT: 156 LBS | DIASTOLIC BLOOD PRESSURE: 65 MMHG | BODY MASS INDEX: 26.63 KG/M2

## 2024-08-21 DIAGNOSIS — Z95.2 S/P MVR (MITRAL VALVE REPLACEMENT): Primary | ICD-10-CM

## 2024-08-21 DIAGNOSIS — Z95.2 S/P AVR: ICD-10-CM

## 2024-08-21 PROCEDURE — 99024 POSTOP FOLLOW-UP VISIT: CPT | Performed by: THORACIC SURGERY (CARDIOTHORACIC VASCULAR SURGERY)

## 2024-08-21 NOTE — TELEPHONE ENCOUNTER
Asmita Rn with [facility] has some concerns regarding the pt medications. Asmita done a nurse eval today. Pt is confused, tried to do matching cards and pt cognitive is off. Asmita is concerned if she is taking her medications right and not sure what she need to be taking.There also may be some drug interactions going on with all the medications she is taking. Asmita would appreciate a call back

## 2024-08-21 NOTE — PROGRESS NOTES
[facility]  [facility]  MD Rambo Vargas MD          Tanika Medina       [id number]  8/21/2024 1945      Tanika Medina is seen today for follow-up status post Aortic valve replacement with a 21mm Duffy valve as well as Mitral valve replacement with a 25mm Duffy valve on 7/11/24. Post op, she was gradually weaned off of dobutamine. She had intermittent a-fib and was started on IV amiodarone. She developed torsades early on POD 2 and continued to have intermittent NSVT. Her QT was prolonged. Amiodarone was stopped. All other QT prolonging medications were stopped as well. Her rhythm improved and she was transferred to  stepdown on POD 4. Her QT gradually improved. She was stable on room air. She was seen by electrophysiology with planned ICD implantation. She developed atrial flutter with slow VR. She underwent implantation of a [employer] ICD on 7/18 by Dr Castro. No further atrial arrhythmias were noted on telemetry. Patient/family requested pt transfer to rehab. Discharge was delayed given process of insurance authorization and awaiting bed availability. She was discharged to [facility] on 7/23/24.     she is fairly active and ambulatory, currently still using rolling walker. She is working with  PT.  There is no fever or shortness of breath.    Appetite is better.   Pain has improved.   Pt is sleeping ok.     EXAM:  Vitals:  Blood pressure 99/65, pulse 69, height 1.626 m (5' 4\"), weight 70.8 kg (156 lb), SpO2 94%.  Lungs:  Clear to auscultation bilaterally.  Heart: Regular rate and rhythm without murmurs.  Incision: Sternum stable. Incision healing well. Leg incisions healing well.   Legs: mild edema    IMPRESSION and PLAN:  DC from CT surgery. Continue HH PT for now then cardiac rehab.     Sternal precautions: Pt advised to avoid any heavy lifting for another 4 weeks but can increase activity as tolerated.   Pt has cardiology

## 2024-08-21 NOTE — TELEPHONE ENCOUNTER
Asmita COLON w/[facility] saw pt.today post surgery.Pt.not able to handle meds on own per Asmita.She has been on Hydrocodone for years from a doctor out of town and it does not seem like she is reporting this med.There is also 3 other meds on med list that interact w/hydrocodone Topamax,Trazodone,Nortriptyline and she is also taking 300mg of Trazodone instead of 100mg .Asmita called surgeon was told to call here.Pt.wants to go over med list.    I went over med list w/Asmita and our med list matches what she has except for the above mentioned meds.I told her I can not advise on meds we did not prescribe and that she should call the prescribing physician.Asmita v/u.    She also asked if pt.is cleared to drive post surgery.I told her pt.saw surgeon today to please call that office about driving restrictions.She v/u.

## 2024-08-30 ENCOUNTER — TELEPHONE (OUTPATIENT)
Age: 79
End: 2024-08-30

## 2024-08-30 NOTE — TELEPHONE ENCOUNTER
Called and left a message for patient stating we need to cancel her appt for 9/3 because  would like to get an echo first then follow up with him.    No

## 2024-09-26 ENCOUNTER — OFFICE VISIT (OUTPATIENT)
Age: 79
End: 2024-09-26
Payer: MEDICARE

## 2024-09-26 ENCOUNTER — TELEPHONE (OUTPATIENT)
Age: 79
End: 2024-09-26

## 2024-09-26 VITALS
BODY MASS INDEX: 26.63 KG/M2 | SYSTOLIC BLOOD PRESSURE: 98 MMHG | DIASTOLIC BLOOD PRESSURE: 70 MMHG | HEIGHT: 64 IN | HEART RATE: 88 BPM | WEIGHT: 156 LBS

## 2024-09-26 DIAGNOSIS — Z09 FOLLOW-UP VISIT FOR MITRAL VALVE REPLACEMENT WITH BIOPROSTHETIC VALVE: ICD-10-CM

## 2024-09-26 DIAGNOSIS — Z09 FOLLOW-UP VISIT FOR AORTIC VALVE REPLACEMENT WITH BIOPROSTHETIC VALVE: Primary | ICD-10-CM

## 2024-09-26 DIAGNOSIS — Z95.3 FOLLOW-UP VISIT FOR MITRAL VALVE REPLACEMENT WITH BIOPROSTHETIC VALVE: ICD-10-CM

## 2024-09-26 DIAGNOSIS — Z95.3 FOLLOW-UP VISIT FOR AORTIC VALVE REPLACEMENT WITH BIOPROSTHETIC VALVE: Primary | ICD-10-CM

## 2024-09-26 DIAGNOSIS — Z86.79 HEART DISEASE, ACQUIRED, CARDIOMYOPATHY, REMOTE, RESOLVED: ICD-10-CM

## 2024-09-26 PROCEDURE — 99214 OFFICE O/P EST MOD 30 MIN: CPT | Performed by: INTERNAL MEDICINE

## 2024-09-26 PROCEDURE — 1123F ACP DISCUSS/DSCN MKR DOCD: CPT | Performed by: INTERNAL MEDICINE

## 2024-09-26 RX ORDER — NORTRIPTYLINE HCL 25 MG
CAPSULE ORAL
COMMUNITY
Start: 2024-03-11

## 2024-09-26 RX ORDER — ASPIRIN 81 MG/1
81 TABLET ORAL DAILY
Qty: 90 TABLET | Refills: 0 | Status: SHIPPED | OUTPATIENT
Start: 2024-09-26

## 2024-09-26 RX ORDER — TRAZODONE HYDROCHLORIDE 150 MG/1
150 TABLET ORAL NIGHTLY
COMMUNITY

## 2024-09-26 RX ORDER — HYDROCODONE BITARTRATE AND ACETAMINOPHEN 10; 325 MG/1; MG/1
TABLET ORAL
COMMUNITY
Start: 2024-09-13

## 2024-09-26 RX ORDER — GABAPENTIN 300 MG/1
CAPSULE ORAL
COMMUNITY
Start: 2024-08-26

## 2024-11-18 NOTE — TELEPHONE ENCOUNTER
MEDICATION REFILL REQUEST      Name of Medication:  Antibiotic  for dental cleaning  Dose:  ?  Frequency:  ?  Quantity:  ?  Days' supply:  ?      Pharmacy Name/Location:  [phone]  Please call pt to let her know something has been called in

## 2024-11-22 NOTE — TELEPHONE ENCOUNTER
Patient is calling back for update on antibiotic for dental work. Please call patient today to let her know.

## 2024-11-25 RX ORDER — AMPICILLIN 500 MG/1
2000 CAPSULE ORAL ONCE
Qty: 4 CAPSULE | Refills: 0 | Status: SHIPPED | OUTPATIENT
Start: 2024-11-25 | End: 2024-11-26

## 2024-11-25 NOTE — TELEPHONE ENCOUNTER
Garry Zuniga MD  You3 days ago       Ampicillin 500, 4 tabs once 30 min prior to procedure       Requested Prescriptions     Pending Prescriptions Disp Refills    ampicillin (PRINCIPEN) 500 MG capsule 4 capsule 0     Sig: Take 4 capsules by mouth once for 1 dose 30 min prior to procedure

## 2024-11-26 RX ORDER — AMPICILLIN 500 MG/1
2000 CAPSULE ORAL ONCE
Qty: 4 CAPSULE | Refills: 0 | Status: SHIPPED | OUTPATIENT
Start: 2024-11-26 | End: 2024-11-26

## 2025-01-29 ENCOUNTER — TELEPHONE (OUTPATIENT)
Age: 80
End: 2025-01-29

## 2025-01-29 NOTE — TELEPHONE ENCOUNTER
Patient called and verifies intermittent CP centered over left chest area and radiating into shoulder area this has been going on for over 1 week. She denies anything that precipitates this CP or makes it worse. States that it comes out of the blue. She normally take her Norco and lies down and the CP will go away. She reports trying the nitroglycerin for this CP and says that it does not help. She wants Dr. Zuniga's recommendations and would like to know if she should be seen sooner than next appointment with Dr. Zuniga on 2/12/2025.

## 2025-01-29 NOTE — TELEPHONE ENCOUNTER
I called the patient back, she answered. I instructed patient that if CP is persistent or not relieved by normal measures to go to ER to be evaluated more thoroughly. Patient voiced her understanding and states that she would, and call us back for any further questions or concerns.     Garry Zuniga MD  You7 minutes ago (2:52 PM)       I tried calling the pt. No answer.      You  Garry Zuniga MD29 minutes ago (2:30 PM)     EC  Thank you for calling the patient, do you need me to do anything else to assist this patient now?    Garry Parks MD  You1 hour ago (1:45 PM)       I tried calling the patient.

## 2025-01-29 NOTE — TELEPHONE ENCOUNTER
Patient called stating she has the following symptoms :    Intermittent CP  Centered under left breast  Radiating into upper behind shoulder blade  Onset 1+ week ago

## 2025-02-12 ENCOUNTER — OFFICE VISIT (OUTPATIENT)
Age: 80
End: 2025-02-12
Payer: MEDICARE

## 2025-02-12 ENCOUNTER — NURSE ONLY (OUTPATIENT)
Age: 80
End: 2025-02-12

## 2025-02-12 VITALS
DIASTOLIC BLOOD PRESSURE: 70 MMHG | HEART RATE: 70 BPM | BODY MASS INDEX: 27.98 KG/M2 | SYSTOLIC BLOOD PRESSURE: 128 MMHG | HEIGHT: 64 IN | WEIGHT: 163.9 LBS

## 2025-02-12 DIAGNOSIS — I25.10 ASCVD (ARTERIOSCLEROTIC CARDIOVASCULAR DISEASE): ICD-10-CM

## 2025-02-12 DIAGNOSIS — Z86.79 HX OF VENTRICULAR TACHYCARDIA: Primary | ICD-10-CM

## 2025-02-12 DIAGNOSIS — I38 VALVULAR HEART DISEASE: Primary | ICD-10-CM

## 2025-02-12 PROCEDURE — 1125F AMNT PAIN NOTED PAIN PRSNT: CPT | Performed by: INTERNAL MEDICINE

## 2025-02-12 PROCEDURE — 1123F ACP DISCUSS/DSCN MKR DOCD: CPT | Performed by: INTERNAL MEDICINE

## 2025-02-12 PROCEDURE — 99214 OFFICE O/P EST MOD 30 MIN: CPT | Performed by: INTERNAL MEDICINE

## 2025-02-12 RX ORDER — NITROGLYCERIN 0.4 MG/1
0.4 TABLET SUBLINGUAL EVERY 5 MIN PRN
Qty: 25 TABLET | Refills: 11 | Status: SHIPPED | OUTPATIENT
Start: 2025-02-12

## 2025-02-12 NOTE — PROGRESS NOTES
[facility] CARDIOLOGY  [address] [place] [address], [address]  [place] [address]  PHONE: [phone]        25        NAME:  Tanika Medina  : 1945  MRN: [id number]     bAVR   bMVR   Ascvd, hx supa pci  Presence defibrillator  Hx gastric ulcer  Chronic pain    CHIEF COMPLAINT:    ASCVD (arteriosclerotic cardiovascular disease)      SUBJECTIVE:     Home from rehab 4 months, doing well, no limitations.    No chest pain or palpitation or dizziness.  Active.  No limitations, going to rehab.       Medications were all reviewed with the patient today and updated as necessary.   Current Outpatient Medications   Medication Sig    nortriptyline (PAMELOR) 25 MG capsule     docusate sodium (COLACE) 50 MG capsule Take 1 capsule by mouth as needed    traZODone (DESYREL) 150 MG tablet Take 1 tablet by mouth nightly    gabapentin (NEURONTIN) 300 MG capsule 600 mg qhs    HYDROcodone-acetaminophen (NORCO)  MG per tablet TAKE 1 TABLET BY ORAL ROUTE EVERY 4-6 HOURS AS NEEDED FOR PAIN    tiZANidine (ZANAFLEX) 4 MG tablet TAKE 1 TABLET (4 MG TOTAL) BY MOUTH EVERY 8 (EIGHT) HOURS AS NEEDED FOR MUSCLE SPASMS    acetaminophen (TYLENOL) 325 MG tablet Take 2 tablets by mouth every 6 hours as needed for Pain    levothyroxine (SYNTHROID) 150 MCG tablet Take 0.5 tablets by mouth every morning (before breakfast) (Patient taking differently: Take 0.5 tablets by mouth every morning (before breakfast) 1 whole tab daily)    nitroGLYCERIN (NITROSTAT) 0.4 MG SL tablet Place 1 tablet under the tongue every 5 minutes as needed for Chest pain up to max of 3 total doses. If no relief after 1 dose, call 911.    DULoxetine (CYMBALTA) 60 MG extended release capsule Take 1 capsule by mouth daily    BIOTIN PO Take by mouth    CALCIUM PO Take 1,200 mg by mouth    Cholecalciferol 50 MCG ( UT) TABS Take by mouth 2 times daily    cyanocobalamin 1000 MCG tablet Take 1 tablet by mouth daily    cyclobenzaprine (FLEXERIL) 10 MG tablet

## 2025-02-17 PROCEDURE — 93295 DEV INTERROG REMOTE 1/2/MLT: CPT | Performed by: INTERNAL MEDICINE

## 2025-02-17 PROCEDURE — 93296 REM INTERROG EVL PM/IDS: CPT | Performed by: INTERNAL MEDICINE

## 2025-05-22 PROCEDURE — 93296 REM INTERROG EVL PM/IDS: CPT | Performed by: INTERNAL MEDICINE

## 2025-05-22 PROCEDURE — 93295 DEV INTERROG REMOTE 1/2/MLT: CPT | Performed by: INTERNAL MEDICINE

## (undated) DEVICE — AUTOTRANSFUSION KIT 120 MH FAST STRT AT1 FOR CATS +

## (undated) DEVICE — BAND RADIAL COMPR ARTERY 24CM -- REG BX/10

## (undated) DEVICE — FLOTRAC SENSOR W/ VAMP ADULT (60"/152CM): Brand: FLOTRAC, VAMP

## (undated) DEVICE — Device

## (undated) DEVICE — GUIDEWIRE 035IN 210CM PTFE COAT FIX COR J TIP 15MM FIRM BODY

## (undated) DEVICE — CLAMP INSERT: Brand: STEALTH® CLAMP INSERT

## (undated) DEVICE — SUTURE PROL SZ 3-0 L36IN NONABSORBABLE BLU L26MM SH 1/2 CIR 8522H

## (undated) DEVICE — DRAIN SURG SGL COLL PT TB FOR ATS BG OASIS

## (undated) DEVICE — DEVICE CLSR 5FR BIOABSRB FULL INTEGR RAP HEMSTAS FOR FEM

## (undated) DEVICE — CATHETER 5FR CORDIS PIG 145DEG 110CM

## (undated) DEVICE — SOLUTION IRRIG 3000ML 0.9% SOD CHL FLX CONT 0797208] ICU MEDICAL INC]

## (undated) DEVICE — SOLUTION IRRIG 3000ML 0.9% SOD CHL USP UROMATIC PLAS CONT

## (undated) DEVICE — SUTURE ETHIBOND EXCEL SZ 0 L18IN NONABSORBABLE GRN L26MM MO-6 CX45D

## (undated) DEVICE — PINNACLE TIF INTRODUCER SHEATH: Brand: PINNACLE

## (undated) DEVICE — SUTURE ABSORBABLE MONOFILAMENT 4-0 CV15 6 IN PUR V-LOC 90 VLOCM1203

## (undated) DEVICE — CABG-AVR DR WILLIAMS: Brand: MEDLINE INDUSTRIES, INC.

## (undated) DEVICE — CATHETER THRMDIL 7FR L110CM STD PULM ART 4 INFUS LUMN SWN

## (undated) DEVICE — PLASMABLADE X PS210-030S-LIGHT 3.0SL: Brand: PLASMABLADE™ X

## (undated) DEVICE — LIQUIBAND RAPID ADHESIVE 36/CS 0.8ML: Brand: MEDLINE

## (undated) DEVICE — CATHETER THOR 32FR L23IN PVC 6 EYELET STR ATRAUM

## (undated) DEVICE — SUTURE NONABSORBABLE BRAID SILK BLK RB-1 3-0 24IN K572H

## (undated) DEVICE — PERFUSION PACK XCOATING 76320] TERUMO CARDIOVASCULAR]

## (undated) DEVICE — 18 GA N.G. KIT, 10 PACK: Brand: SITE-RITE

## (undated) DEVICE — SOLUTION IRRIG 1000ML 0.9% SOD CHL USP POUR PLAS BTL

## (undated) DEVICE — PERCUTANEOUS INSERTION KIT-ARTERIAL: Brand: PERCUTANEOUS INSERTION KIT-ARTERIAL

## (undated) DEVICE — CARDINAL HEALTH FLEXIBLE LIGHT HANDLE COVER: Brand: CARDINAL HEALTH

## (undated) DEVICE — COR-KNOT MINI® COMBO KITBASE PACKAGE TYPE - KITEACH STERILE PACKAGE KIT CONTAINS (2) SINGLE PATIENT USE COR-KNOT MINI® DEVICES AND (12) COR-KNOT® QUICK LOADS®.: Brand: COR-KNOT MINI®

## (undated) DEVICE — VASCADE MVP SYSTEM CLOSURE 6-12 FR VEN VASC

## (undated) DEVICE — SUTURE ABSORBABLE MONOFILAMENT 3-0 PS 24 CM 26 MM VIO PDS +

## (undated) DEVICE — DRESSING POSTOP AG PRISMASEAL 3.5X6IN

## (undated) DEVICE — SOLUTION IRRIG 1000ML LAC RINGER PLAS POUR BTL

## (undated) DEVICE — PINNACLE INTRODUCER SHEATH: Brand: PINNACLE

## (undated) DEVICE — INTRODUCER SPLIT SHEATH PRELUDE SNAP 8FR X 13CM

## (undated) DEVICE — MPS® DELIVERY SET WITH 6 FT. DELIVERY TUBING: Brand: MPS

## (undated) DEVICE — KIT CATH L11.5CM DIA9FR CTRL VEN POLYUR ANTIMIC COAT DBL

## (undated) DEVICE — GLIDESHEATH SLENDER STAINLESS STEEL KIT: Brand: GLIDESHEATH SLENDER

## (undated) DEVICE — PREVENA INCISION MANAGEMENT SYSTEM- PEEL & PLACE DRESSING: Brand: PREVENA™ PEEL & PLACE™

## (undated) DEVICE — INTRODUCER LD L13CM OD6FR SPLITTABLE DIL W/ J TIP GWIRE SYR

## (undated) DEVICE — COR-KNOT® QUICK LOAD® SINGLES: Brand: COR-KNOT® QUICK LOAD®

## (undated) DEVICE — 18G NG KIT WITH 96IN PROBE COVER (10 PK): Brand: SITE-RITE

## (undated) DEVICE — TTL1LYR 16FR10ML 100%SIL TMPST TR: Brand: MEDLINE

## (undated) DEVICE — CATHETER THOR 32FR L23IN PVC 5 EYELET STR ATRAUM

## (undated) DEVICE — CANISTER, RIGID, 3000CC: Brand: MEDLINE INDUSTRIES, INC.

## (undated) DEVICE — COR-KNOT® QUICK LOAD® 6-POUCH: Brand: COR-KNOT® QUICK LOAD®

## (undated) DEVICE — CATHETER DIAG 5FR L100CM LUMN ID0.047IN JL4 CRV 0 SIDE H

## (undated) DEVICE — MEDTRONIC JR4.0 DIAGNOSTIC CATHETER

## (undated) DEVICE — PRESSURE MONITORING SET: Brand: TRUWAVE, VAMP PLUS

## (undated) DEVICE — SHTH INTRO W/GW 7F 11CM W/O OB -- AVANTI+ - ORDER AS EA

## (undated) DEVICE — RADIFOCUS OPTITORQUE ANGIOGRAPHIC CATHETER: Brand: OPTITORQUE

## (undated) DEVICE — MICROPUNCTURE INTRODUCER SET SILHOUETTE TRANSITIONLESS WITH NITINOL WIRE GUIDE: Brand: MICROPUNCTURE

## (undated) DEVICE — CATHETER DIAG AD 5FR L125CM COR NYL MP AMPLATZ 2 W/ 2 SIDE